# Patient Record
Sex: FEMALE | Race: WHITE | Employment: PART TIME | URBAN - METROPOLITAN AREA
[De-identification: names, ages, dates, MRNs, and addresses within clinical notes are randomized per-mention and may not be internally consistent; named-entity substitution may affect disease eponyms.]

---

## 2017-01-16 RX ORDER — AMITRIPTYLINE HYDROCHLORIDE 10 MG/1
10 TABLET, FILM COATED ORAL
Qty: 30 TAB | Refills: 5 | Status: SHIPPED | OUTPATIENT
Start: 2017-01-16 | End: 2017-05-22 | Stop reason: SDUPTHER

## 2017-03-24 ENCOUNTER — OFFICE VISIT (OUTPATIENT)
Dept: INTERNAL MEDICINE CLINIC | Age: 57
End: 2017-03-24

## 2017-03-24 ENCOUNTER — HOSPITAL ENCOUNTER (OUTPATIENT)
Dept: MAMMOGRAPHY | Age: 57
Discharge: HOME | End: 2017-03-24
Attending: INTERNAL MEDICINE
Payer: COMMERCIAL

## 2017-03-24 ENCOUNTER — HOSPITAL ENCOUNTER (OUTPATIENT)
Dept: LAB | Age: 57
Discharge: HOME | End: 2017-03-24
Payer: COMMERCIAL

## 2017-03-24 VITALS
RESPIRATION RATE: 18 BRPM | TEMPERATURE: 96.1 F | WEIGHT: 190 LBS | DIASTOLIC BLOOD PRESSURE: 86 MMHG | OXYGEN SATURATION: 97 % | BODY MASS INDEX: 34.96 KG/M2 | SYSTOLIC BLOOD PRESSURE: 134 MMHG | HEIGHT: 62 IN | HEART RATE: 97 BPM

## 2017-03-24 DIAGNOSIS — R41.3 MEMORY LOSS, SHORT TERM: ICD-10-CM

## 2017-03-24 DIAGNOSIS — E78.2 MIXED HYPERLIPIDEMIA: ICD-10-CM

## 2017-03-24 DIAGNOSIS — Z01.419 WELL WOMAN EXAM WITH ROUTINE GYNECOLOGICAL EXAM: ICD-10-CM

## 2017-03-24 DIAGNOSIS — R51.9 ACUTE NONINTRACTABLE HEADACHE, UNSPECIFIED HEADACHE TYPE: ICD-10-CM

## 2017-03-24 DIAGNOSIS — E55.9 VITAMIN D DEFICIENCY: ICD-10-CM

## 2017-03-24 DIAGNOSIS — R63.1 INCREASED THIRST: ICD-10-CM

## 2017-03-24 DIAGNOSIS — Z01.419 WELL WOMAN EXAM WITH ROUTINE GYNECOLOGICAL EXAM: Primary | ICD-10-CM

## 2017-03-24 DIAGNOSIS — R19.8 RECTAL FULLNESS: ICD-10-CM

## 2017-03-24 DIAGNOSIS — I10 ESSENTIAL HYPERTENSION: Chronic | ICD-10-CM

## 2017-03-24 PROCEDURE — 77067 SCR MAMMO BI INCL CAD: CPT

## 2017-03-24 PROCEDURE — 87624 HPV HI-RISK TYP POOLED RSLT: CPT | Performed by: NURSE PRACTITIONER

## 2017-03-24 PROCEDURE — 88175 CYTOPATH C/V AUTO FLUID REDO: CPT | Performed by: NURSE PRACTITIONER

## 2017-03-24 RX ORDER — BISMUTH SUBSALICYLATE 262 MG
1 TABLET,CHEWABLE ORAL DAILY
COMMUNITY
End: 2018-05-02 | Stop reason: ALTCHOICE

## 2017-03-24 RX ORDER — MULTIVIT WITH MINERALS/HERBS
1 TABLET ORAL AS NEEDED
COMMUNITY
End: 2020-05-15

## 2017-03-24 NOTE — MR AVS SNAPSHOT
Visit Information Date & Time Provider Department Dept. Phone Encounter #  
 3/24/2017  9:00 AM Alexis Lane NP [facility] [address] [id number] Upcoming Health Maintenance Date Due  
 PAP AKA CERVICAL CYTOLOGY 12/21/2015 BREAST CANCER SCRN MAMMOGRAM 2/13/2018 COLONOSCOPY 6/28/2020 DTaP/Tdap/Td series (2 - Td) 12/21/2020 Allergies as of 3/24/2017  Review Complete On: 3/24/2017 By: Alexis Lane NP Severity Noted Reaction Type Reactions Erythromycin  11/04/2010    Nausea and Vomiting Lisinopril  07/30/2015    Other (comments) \"margoth achy\" Current Immunizations  Reviewed on 6/20/2014 Name Date Influenza Vaccine 10/24/2016, 10/1/2014 Influenza Vaccine Whole 10/1/2012 TDAP Vaccine 12/21/2010 Not reviewed this visit You Were Diagnosed With   
  
 Codes Comments Well woman exam with routine gynecological exam    -  Primary ICD-10-CM: K74.827 ICD-9-CM: V72.31 Essential hypertension     ICD-10-CM: I10 
ICD-9-CM: 401.9 Mixed hyperlipidemia     ICD-10-CM: E78.2 ICD-9-CM: 272.2 Increased thirst     ICD-10-CM: R63.1 ICD-9-CM: 783.5 Memory loss, short term     ICD-10-CM: R41.3 ICD-9-CM: 780.93 Rectal fullness     ICD-10-CM: R19.8 ICD-9-CM: 787.99 Acute nonintractable headache, unspecified headache type     ICD-10-CM: R51 ICD-9-CM: 188. 0 Vitamin D deficiency     ICD-10-CM: E55.9 ICD-9-CM: 268.9 Vitals BP Pulse Temp Resp Height(growth percentile) Weight(growth percentile) (!) 134/98 97 96.1 °F (35.6 °C) (Oral) 18 5' 2\" (1.575 m) 190 lb (86.2 kg) LMP SpO2 BMI OB Status Smoking Status 11/07/2010 97% 34.75 kg/m2 Postmenopausal Never Smoker Vitals History BMI and BSA Data Body Mass Index Body Surface Area 34.75 kg/m 2 1.94 m 2 Preferred Pharmacy Pharmacy Name Phone [id number]- [address] AT [facility] [phone] Your Updated Medication List  
  
   
This list is accurate as of: 3/24/17  9:56 AM.  Always use your most recent med list.  
  
  
  
  
 * amitriptyline 10 mg tablet Commonly known as:  ELAVIL TAKE 1 TABLET BY MOUTH NIGHTLY. FOR MIGRAINE PREVENTION AND SLEEP  
  
 * amitriptyline 10 mg tablet Commonly known as:  ELAVIL Take 1 Tab by mouth nightly. atorvastatin 10 mg tablet Commonly known as:  LIPITOR Take 1 Tab by mouth daily. b complex vitamins tablet Take 1 Tab by mouth daily. FISH -160-1,000 mg Cap Generic drug:  omega 3-dha-epa-fish oil Take  by mouth. hydroCHLOROthiazide 25 mg tablet Commonly known as:  HYDRODIURIL Take 1 Tab by mouth daily. multivitamin tablet Commonly known as:  ONE A DAY Take 1 Tab by mouth daily. omeprazole 20 mg tablet Commonly known as:  PriLOSEC OTC Take 20 mg by mouth daily. potassium chloride 10 mEq tablet Commonly known as:  K-DUR, KLOR-CON Take 1 Tab by mouth two (2) times a day for 30 days. promethazine 25 mg tablet Commonly known as:  PHENERGAN Take 1 Tab by mouth every six (6) hours as needed for Nausea. rizatriptan 10 mg disintegrating tablet Commonly known as:  MAXALT-MLT Take 1 Tab by mouth once as needed for Migraine for up to 1 dose. vit a,c & e-lutein-minerals tablet Commonly known as:  OCUVITE  
daily. * Notice: This list has 2 medication(s) that are the same as other medications prescribed for you. Read the directions carefully, and ask your doctor or other care provider to review them with you. We Performed the Following CBC WITH AUTOMATED DIFF [07008 CPT(R)] HEMOGLOBIN A1C WITH EAG [47451 CPT(R)] LIPID PANEL [84069 CPT(R)] METABOLIC PANEL, COMPREHENSIVE [62844 CPT(R)] PAP IG, APTIMA HPV AND RFX 16/18,45 ([id number]) [[id number] Custom] REFERRAL TO COLON AND RECTAL SURGERY [REF17 Custom] REFERRAL TO NEUROLOGY [UJQ99 Custom] Comments:  
 Please evaluate patient for memory loss; right sided headache; history of migraines TSH 3RD GENERATION [89878 CPT(R)] URINALYSIS W/ RFLX MICROSCOPIC [32496 CPT(R)] VITAMIN D, 25 HYDROXY [id number] CPT(R)] To-Do List   
 03/24/2017 Imaging:  YESY MAMMO BI SCREENING INCL CAD   
  
 03/24/2017 Imaging:  MRI BRAIN W WO CONT Referral Information Referral ID Referred By Referred To  
  
 [id number] MD Keturah Barksdale [address] [facility], [address] [facility] Phone: [phone] Fax: [phone] Visits Status Start Date End Date 1 New Request 3/24/17 3/24/18 If your referral has a status of pending review or denied, additional information will be sent to support the outcome of this decision. Referral ID Referred By Referred To  
 [id number] Yamilka Ahumada MD  
   [place] [address] [facility] [place], [facility] [address] Phone: [phone] Fax: [phone] Visits Status Start Date End Date 1 New Request 3/24/17 3/24/18 If your referral has a status of pending review or denied, additional information will be sent to support the outcome of this decision. Patient Instructions Well Visit, Women 48 to 72: Care Instructions Your Care Instructions Physical exams can help you stay healthy. Your doctor has checked your overall health and may have suggested ways to take good care of yourself. He or she also may have recommended tests. At home, you can help prevent illness with healthy eating, regular exercise, and other steps. Follow-up care is a key part of your treatment and safety.  Be sure to make and go to all appointments, and call your doctor if you are having problems. It's also a good idea to know your test results and keep a list of the medicines you take. How can you care for yourself at home? · Reach and stay at a healthy weight. This will lower your risk for many problems, such as obesity, diabetes, heart disease, and high blood pressure. · Get at least 30 minutes of exercise on most days of the week. Walking is a good choice. You also may want to do other activities, such as running, swimming, cycling, or playing tennis or team sports. · Do not smoke. Smoking can make health problems worse. If you need help quitting, talk to your doctor about stop-smoking programs and medicines. These can increase your chances of quitting for good. · Protect your skin from too much sun. When you're outdoors from 10 a.m. to 4 p.m., stay in the shade or cover up with clothing and a hat with a wide brim. Wear sunglasses that block UV rays. Even when it's cloudy, put broad-spectrum sunscreen (SPF 30 or higher) on any exposed skin. · See a dentist one or two times a year for checkups and to have your teeth cleaned. · Wear a seat belt in the car. · Limit alcohol to 1 drink a day. Too much alcohol can cause health problems. Follow your doctor's advice about when to have certain tests. These tests can spot problems early. · Cholesterol. Your doctor will tell you how often to have this done based on your age, family history, or other things that can increase your risk for heart attack and stroke. · Blood pressure. Have your blood pressure checked during a routine doctor visit. Your doctor will tell you how often to check your blood pressure based on your age, your blood pressure results, and other factors. · Mammogram. Ask your doctor how often you should have a mammogram, which is an X-ray of your breasts. A mammogram can spot breast cancer before it can be felt and when it is easiest to treat. · Pap test and pelvic exam. Ask your doctor how often you should have a Pap test. You may not need to have a Pap test as often as you used to. · Vision. Have your eyes checked every year or two or as often as your doctor suggests. Some experts recommend that you have yearly exams for glaucoma and other age-related eye problems starting at age 48. · Hearing. Tell your doctor if you notice any change in your hearing. You can have tests to find out how well you hear. · Diabetes. Ask your doctor whether you should have tests for diabetes. · Colon cancer. You should begin tests for colon cancer at age 48. You may have one of several tests. Your doctor will tell you how often to have tests based on your age and risk. Risks include whether you already had a precancerous polyp removed from your colon or whether your parents, sisters and brothers, or children have had colon cancer. · Thyroid disease. Talk to your doctor about whether to have your thyroid checked as part of a regular physical exam. Women have an increased chance of a thyroid problem. · Osteoporosis. You should begin tests for bone density at age 72. If you are younger than 72, ask your doctor whether you have factors that may increase your risk for this disease. You may want to have this test before age 72. · Heart attack and stroke risk. At least every 4 to 6 years, you should have your risk for heart attack and stroke assessed. Your doctor uses factors such as your age, blood pressure, cholesterol, and whether you smoke or have diabetes to show what your risk for a heart attack or stroke is over the next 10 years. When should you call for help? Watch closely for changes in your health, and be sure to contact your doctor if you have any problems or symptoms that concern you. Where can you learn more? Go to http://keshia-sabrina.info/.  
Enter J868 in the search box to learn more about \"Well Visit, Women 48 to 65: Care Instructions. \" Current as of: July 19, 2016 Content Version: 11.1 © 6177-5367 [employer] Media[employer]. Care instructions adapted under license by [employer] (which disclaims liability or warranty for this information). If you have questions about a medical condition or this instruction, always ask your healthcare professional. [address] any warranty or liability for your use of this information. Learning About High Cholesterol What is high cholesterol? Cholesterol is a type of fat in your blood. It is needed for many body functions, such as making new cells. Cholesterol is made by your body. It also comes from food you eat. If you have too much cholesterol, it starts to build up in your arteries. This is called hardening of the arteries, or atherosclerosis. High cholesterol raises your risk of a heart attack and stroke. There are different types of cholesterol. LDL is the \"bad\" cholesterol. High LDL can raise your risk for heart disease, heart attack, and stroke. HDL is the \"good\" cholesterol. High HDL is linked with a lower risk for heart disease, heart attack, and stroke. Your cholesterol levels help your doctor find out your risk for having a heart attack or stroke. How can you prevent high cholesterol? A heart-healthy lifestyle can help you prevent high cholesterol. This lifestyle helps lower your risk for a heart attack and stroke. · Eat heart-healthy foods. ¨ Eat fruits, vegetables, whole grains (like oatmeal), dried beans and peas, nuts and seeds, soy products (like tofu), and fat-free or low-fat dairy products. ¨ Replace butter, margarine, and hydrogenated or partially hydrogenated oils with olive and canola oils. (Canola oil margarine without trans fat is fine.) ¨ Replace red meat with fish, poultry, and soy protein (like tofu). ¨ Limit processed and packaged foods like chips, crackers, and cookies. · Be active. Exercise can improve your cholesterol level. Get at least 30 minutes of exercise on most days of the week. Walking is a good choice. You also may want to do other activities, such as running, swimming, cycling, or playing tennis or team sports. · Stay at a healthy weight. Lose weight if you need to. · Don't smoke. If you need help quitting, talk to your doctor about stop-smoking programs and medicines. These can increase your chances of quitting for good. How is high cholesterol treated? The goal of treatment is to reduce your chances of having a heart attack or stroke. The goal is not to lower your cholesterol numbers only. · You may make lifestyle changes, such as eating healthy foods, not smoking, losing weight, and being more active. · You may have to take medicine. Follow-up care is a key part of your treatment and safety. Be sure to make and go to all appointments, and call your doctor if you are having problems. It's also a good idea to know your test results and keep a list of the medicines you take. Where can you learn more? Go to http://keshia-sabrina.info/. Enter I786 in the search box to learn more about \"Learning About High Cholesterol. \" Current as of: January 27, 2016 Content Version: 11.1 © 0513-4089 [employer]. Care instructions adapted under license by Hemenkiralik.[employer] (which disclaims liability or warranty for this information). If you have questions about a medical condition or this instruction, always ask your healthcare professional. [place] [address] any warranty or liability for your use of this information. Introducing [facility]! Dear Marcos Bob: 
Thank you for requesting a Hmall.ma account. Our records indicate that you already have an active [email or web address] account. You can access your account anytime at [email or web address] Did you know that you can access your hospital and ER discharge instructions at any time in Ask Ziggy? You can also review all of your test results from your hospital stay or ER visit. Additional Information If you have questions, please visit the Frequently Asked Questions section of the Ask Ziggy website at [email or web address] Remember, Ask Ziggy is NOT to be used for urgent needs. For medical emergencies, dial 911. Now available from your iPhone and Android! Please provide this summary of care documentation to your next provider. Your primary care clinician is listed as Adriano Albarran. If you have any questions after today's visit, please call [phone].

## 2017-03-24 NOTE — PATIENT INSTRUCTIONS
Well Visit, Women 48 to 72: Care Instructions  Your Care Instructions  Physical exams can help you stay healthy. Your doctor has checked your overall health and may have suggested ways to take good care of yourself. He or she also may have recommended tests. At home, you can help prevent illness with healthy eating, regular exercise, and other steps. Follow-up care is a key part of your treatment and safety. Be sure to make and go to all appointments, and call your doctor if you are having problems. It's also a good idea to know your test results and keep a list of the medicines you take. How can you care for yourself at home? · Reach and stay at a healthy weight. This will lower your risk for many problems, such as obesity, diabetes, heart disease, and high blood pressure. · Get at least 30 minutes of exercise on most days of the week. Walking is a good choice. You also may want to do other activities, such as running, swimming, cycling, or playing tennis or team sports. · Do not smoke. Smoking can make health problems worse. If you need help quitting, talk to your doctor about stop-smoking programs and medicines. These can increase your chances of quitting for good. · Protect your skin from too much sun. When you're outdoors from 10 a.m. to 4 p.m., stay in the shade or cover up with clothing and a hat with a wide brim. Wear sunglasses that block UV rays. Even when it's cloudy, put broad-spectrum sunscreen (SPF 30 or higher) on any exposed skin. · See a dentist one or two times a year for checkups and to have your teeth cleaned. · Wear a seat belt in the car. · Limit alcohol to 1 drink a day. Too much alcohol can cause health problems. Follow your doctor's advice about when to have certain tests. These tests can spot problems early. · Cholesterol.  Your doctor will tell you how often to have this done based on your age, family history, or other things that can increase your risk for heart attack and stroke. · Blood pressure. Have your blood pressure checked during a routine doctor visit. Your doctor will tell you how often to check your blood pressure based on your age, your blood pressure results, and other factors. · Mammogram. Ask your doctor how often you should have a mammogram, which is an X-ray of your breasts. A mammogram can spot breast cancer before it can be felt and when it is easiest to treat. · Pap test and pelvic exam. Ask your doctor how often you should have a Pap test. You may not need to have a Pap test as often as you used to. · Vision. Have your eyes checked every year or two or as often as your doctor suggests. Some experts recommend that you have yearly exams for glaucoma and other age-related eye problems starting at age 48. · Hearing. Tell your doctor if you notice any change in your hearing. You can have tests to find out how well you hear. · Diabetes. Ask your doctor whether you should have tests for diabetes. · Colon cancer. You should begin tests for colon cancer at age 48. You may have one of several tests. Your doctor will tell you how often to have tests based on your age and risk. Risks include whether you already had a precancerous polyp removed from your colon or whether your parents, sisters and brothers, or children have had colon cancer. · Thyroid disease. Talk to your doctor about whether to have your thyroid checked as part of a regular physical exam. Women have an increased chance of a thyroid problem. · Osteoporosis. You should begin tests for bone density at age 72. If you are younger than 72, ask your doctor whether you have factors that may increase your risk for this disease. You may want to have this test before age 72. · Heart attack and stroke risk. At least every 4 to 6 years, you should have your risk for heart attack and stroke assessed.  Your doctor uses factors such as your age, blood pressure, cholesterol, and whether you smoke or have diabetes to show what your risk for a heart attack or stroke is over the next 10 years. When should you call for help? Watch closely for changes in your health, and be sure to contact your doctor if you have any problems or symptoms that concern you. Where can you learn more? Go to http://keshia-sabrina.info/. Enter K844 in the search box to learn more about \"Well Visit, Women 50 to 72: Care Instructions. \"  Current as of: July 19, 2016  Content Version: 11.1  © 6158-7266 SwapDrive. Care instructions adapted under license by Per[employer] Pixel (which disclaims liability or warranty for this information). If you have questions about a medical condition or this instruction, always ask your healthcare professional. [address] any warranty or liability for your use of this information. Learning About High Cholesterol  What is high cholesterol? Cholesterol is a type of fat in your blood. It is needed for many body functions, such as making new cells. Cholesterol is made by your body. It also comes from food you eat. If you have too much cholesterol, it starts to build up in your arteries. This is called hardening of the arteries, or atherosclerosis. High cholesterol raises your risk of a heart attack and stroke. There are different types of cholesterol. LDL is the \"bad\" cholesterol. High LDL can raise your risk for heart disease, heart attack, and stroke. HDL is the \"good\" cholesterol. High HDL is linked with a lower risk for heart disease, heart attack, and stroke. Your cholesterol levels help your doctor find out your risk for having a heart attack or stroke. How can you prevent high cholesterol? A heart-healthy lifestyle can help you prevent high cholesterol. This lifestyle helps lower your risk for a heart attack and stroke. · Eat heart-healthy foods.   ¨ Eat fruits, vegetables, whole grains (like oatmeal), dried beans and peas, nuts and seeds, soy products (like tofu), and fat-free or low-fat dairy products. ¨ Replace butter, margarine, and hydrogenated or partially hydrogenated oils with olive and canola oils. (Canola oil margarine without trans fat is fine.)  ¨ Replace red meat with fish, poultry, and soy protein (like tofu). ¨ Limit processed and packaged foods like chips, crackers, and cookies. · Be active. Exercise can improve your cholesterol level. Get at least 30 minutes of exercise on most days of the week. Walking is a good choice. You also may want to do other activities, such as running, swimming, cycling, or playing tennis or team sports. · Stay at a healthy weight. Lose weight if you need to. · Don't smoke. If you need help quitting, talk to your doctor about stop-smoking programs and medicines. These can increase your chances of quitting for good. How is high cholesterol treated? The goal of treatment is to reduce your chances of having a heart attack or stroke. The goal is not to lower your cholesterol numbers only. · You may make lifestyle changes, such as eating healthy foods, not smoking, losing weight, and being more active. · You may have to take medicine. Follow-up care is a key part of your treatment and safety. Be sure to make and go to all appointments, and call your doctor if you are having problems. It's also a good idea to know your test results and keep a list of the medicines you take. Where can you learn more? Go to http://keshia-sabrina.info/. Enter W734 in the search box to learn more about \"Learning About High Cholesterol. \"  Current as of: January 27, 2016  Content Version: 11.1  © 5183-5584 Healthwise, Incorporated. Care instructions adapted under license by [employer] (which disclaims liability or warranty for this information).  If you have questions about a medical condition or this instruction, always ask your healthcare professional. [address] any warranty or liability for your use of this information.

## 2017-03-25 LAB
25(OH)D3+25(OH)D2 SERPL-MCNC: 26.9 NG/ML (ref 30–100)
ALBUMIN SERPL-MCNC: 4.5 G/DL (ref 3.5–5.5)
ALBUMIN/GLOB SERPL: 1.6 {RATIO} (ref 1.2–2.2)
ALP SERPL-CCNC: 64 IU/L (ref 39–117)
ALT SERPL-CCNC: 41 IU/L (ref 0–32)
AST SERPL-CCNC: 23 IU/L (ref 0–40)
BASOPHILS # BLD AUTO: 0 X10E3/UL (ref 0–0.2)
BASOPHILS NFR BLD AUTO: 0 %
BILIRUB SERPL-MCNC: 0.5 MG/DL (ref 0–1.2)
BUN SERPL-MCNC: 20 MG/DL (ref 6–24)
BUN/CREAT SERPL: 27 (ref 9–23)
CALCIUM SERPL-MCNC: 9.6 MG/DL (ref 8.7–10.2)
CHLORIDE SERPL-SCNC: 96 MMOL/L (ref 96–106)
CHOLEST SERPL-MCNC: 283 MG/DL (ref 100–199)
CO2 SERPL-SCNC: 25 MMOL/L (ref 18–29)
COMMENT, 011824: ABNORMAL
CREAT SERPL-MCNC: 0.75 MG/DL (ref 0.57–1)
EOSINOPHIL # BLD AUTO: 0.1 X10E3/UL (ref 0–0.4)
EOSINOPHIL NFR BLD AUTO: 2 %
ERYTHROCYTE [DISTWIDTH] IN BLOOD BY AUTOMATED COUNT: 13.9 % (ref 12.3–15.4)
EST. AVERAGE GLUCOSE BLD GHB EST-MCNC: 111 MG/DL
GLOBULIN SER CALC-MCNC: 2.8 G/DL (ref 1.5–4.5)
GLUCOSE SERPL-MCNC: 107 MG/DL (ref 65–99)
HBA1C MFR BLD: 5.5 % (ref 4.8–5.6)
HCT VFR BLD AUTO: 47.2 % (ref 34–46.6)
HDLC SERPL-MCNC: 48 MG/DL
HGB BLD-MCNC: 15.8 G/DL (ref 11.1–15.9)
IMM GRANULOCYTES # BLD: 0 X10E3/UL (ref 0–0.1)
IMM GRANULOCYTES NFR BLD: 1 %
INTERPRETATION, 910389: NORMAL
LDLC SERPL CALC-MCNC: 209 MG/DL (ref 0–99)
LYMPHOCYTES # BLD AUTO: 1.5 X10E3/UL (ref 0.7–3.1)
LYMPHOCYTES NFR BLD AUTO: 29 %
MCH RBC QN AUTO: 28.3 PG (ref 26.6–33)
MCHC RBC AUTO-ENTMCNC: 33.5 G/DL (ref 31.5–35.7)
MCV RBC AUTO: 85 FL (ref 79–97)
MONOCYTES # BLD AUTO: 0.4 X10E3/UL (ref 0.1–0.9)
MONOCYTES NFR BLD AUTO: 7 %
NEUTROPHILS # BLD AUTO: 3.1 X10E3/UL (ref 1.4–7)
NEUTROPHILS NFR BLD AUTO: 61 %
PLATELET # BLD AUTO: 234 X10E3/UL (ref 150–379)
POTASSIUM SERPL-SCNC: 3.9 MMOL/L (ref 3.5–5.2)
PROT SERPL-MCNC: 7.3 G/DL (ref 6–8.5)
RBC # BLD AUTO: 5.58 X10E6/UL (ref 3.77–5.28)
SODIUM SERPL-SCNC: 140 MMOL/L (ref 134–144)
TRIGL SERPL-MCNC: 131 MG/DL (ref 0–149)
TSH SERPL DL<=0.005 MIU/L-ACNC: 1.71 UIU/ML (ref 0.45–4.5)
VLDLC SERPL CALC-MCNC: 26 MG/DL (ref 5–40)
WBC # BLD AUTO: 5.2 X10E3/UL (ref 3.4–10.8)

## 2017-03-27 RX ORDER — POTASSIUM CHLORIDE 750 MG/1
10 TABLET, EXTENDED RELEASE ORAL 2 TIMES DAILY
Qty: 60 TAB | Refills: 3 | Status: SHIPPED | OUTPATIENT
Start: 2017-03-27 | End: 2017-10-19 | Stop reason: SDUPTHER

## 2017-03-27 RX ORDER — ROSUVASTATIN CALCIUM 10 MG/1
10 TABLET, COATED ORAL
Qty: 30 TAB | Refills: 3 | Status: SHIPPED | OUTPATIENT
Start: 2017-03-27 | End: 2017-06-01 | Stop reason: SDUPTHER

## 2017-03-27 NOTE — PROGRESS NOTES
Message sent and please call patient with cholesterol levels and need to start her on Crestor -- I will not escribe it until we hear that she is willing to try this

## 2017-03-27 NOTE — PROGRESS NOTES
HISTORY OF PRESENT ILLNESS  Melvi Boyd is a 64 y.o. female. HPI  Presents for well woman exam but has other concerns to discuss. Subjective:   Melvi Boyd is a 64 y.o. female with hypertension. Hypertension ROS: taking medications as instructed, no medication side effects noted, no TIA's, no chest pain on exertion, no dyspnea on exertion, no swelling of ankles. New concerns: has been controlled on HCTZ. Reports that she has been intolerant of statins in the past and experienced myalgias with Simvastatin and is unsure if she had problems with Atorvastatin. Her last lipid panel was elevated and Dr oRger Gregory had recommended that she restart the Atorvastatin but she has not done this. She is fasting today. Has noticed that she seems to experience increased thirst over the past several months. Denies polyuria or polyphagia. Has gained weight gradually over the past 2 years. History of migraine headaches and frequency has improved since she started taking Amitriptyline 10 mg but she has been feeling more fatigued in the am.  Has been noting a change in her memory over the past 2 years. Has difficulty focusing and has been more forgetful with small issues and short term memory. Denies having issues with long term memory. Had extensive labs done in 2/2016 as workup for same symptoms. Denies unusual behaviors. Complains of recurrent sudden \"lightening bolt\" sensation on right side of head that radiates to top of scalp for the past 6 months but has been increasing in frequency over the past month. Lasts for several seconds and has been occurring several times per week. Cannot identify any particular trigger and episodes occur randomly. Denies dizziness or lightheadedness. She is very concerned about these symptoms considering her history of migraines. Complains of sensation of rectal fullness where she has to digitally extract stool due to sensation of incomplete emptying.   Feels like she has developed a \"pouch\" in rectum where stool gets caught and the only way she can relieve the pressure is to digitally disimpact stool herself. Denies noting blood in stools. Denies abdominal pain. Has been Vitamin D deficient in the past.  Not currently taking any Vitamin D supplements. Review of Systems   Constitutional: Negative for chills, fever and malaise/fatigue. HENT: Negative for congestion and sore throat. Respiratory: Negative for cough, sputum production and shortness of breath. Cardiovascular: Negative for chest pain, palpitations and leg swelling. Gastrointestinal: Positive for constipation. Negative for abdominal pain, blood in stool, nausea and vomiting. Genitourinary: Negative for dysuria and frequency. Musculoskeletal: Negative for myalgias. Skin: Negative for rash. Neurological: Positive for headaches. Negative for dizziness and tingling. Endo/Heme/Allergies: Positive for polydipsia. Psychiatric/Behavioral: The patient has insomnia. Physical Exam   Constitutional: She is oriented to person, place, and time. She appears well-developed and well-nourished. HENT:   Head: Normocephalic and atraumatic. Right Ear: External ear normal.   Left Ear: External ear normal.   Nose: Nose normal.   Mouth/Throat: Oropharynx is clear and moist.   Eyes: Conjunctivae and EOM are normal. Pupils are equal, round, and reactive to light. Neck: Normal range of motion. Neck supple. No thyromegaly present. Cardiovascular: Normal rate, regular rhythm and normal heart sounds. Pulmonary/Chest: Effort normal and breath sounds normal. She has no wheezes. She has no rales. Abdominal: Soft. Bowel sounds are normal. There is no tenderness. There is no rebound. Musculoskeletal: Normal range of motion. She exhibits no edema. Lymphadenopathy:     She has no cervical adenopathy. Neurological: She is alert and oriented to person, place, and time. No cranial nerve deficit. Coordination normal.   Skin: Skin is warm and dry. Psychiatric: She has a normal mood and affect. Her behavior is normal.   Nursing note and vitals reviewed. ASSESSMENT and PLAN  Caitlin Brown was seen today for complete physical, head pain and memory loss. Diagnoses and all orders for this visit:    Well woman exam with routine gynecological exam  -     YESY MAMMO BI SCREENING INCL CAD; Future  -     CBC WITH AUTOMATED DIFF  -     METABOLIC PANEL, COMPREHENSIVE  -     URINALYSIS W/ RFLX MICROSCOPIC  -     PAP IG, APTIMA HPV AND RFX 16/18,45 ([id number])    Essential hypertension  -     METABOLIC PANEL, COMPREHENSIVE    Mixed hyperlipidemia  -     METABOLIC PANEL, COMPREHENSIVE  -     LIPID PANEL    Increased thirst  -     METABOLIC PANEL, COMPREHENSIVE  -     HEMOGLOBIN A1C WITH EAG  -     URINALYSIS W/ RFLX MICROSCOPIC    Memory loss, short term  -     TSH 3RD GENERATION  -     MRI BRAIN W WO CONT; Future    Rectal fullness  -     REFERRAL TO COLON AND RECTAL SURGERY    Acute nonintractable headache, unspecified headache type  -     REFERRAL TO NEUROLOGY  -     MRI BRAIN W WO CONT; Future    Vitamin D deficiency  -     VITAMIN D, 25 HYDROXY          lab results and schedule of future lab studies reviewed with patient  reviewed diet, exercise and weight control  cardiovascular risk and specific lipid/LDL goals reviewed  reviewed medications and side effects in detail    Subjective:   64 y.o. female for Well Woman Check. Last pap in 2012. She is postmenopausal.  Denies any vaginal bleeding. Social History: single partner, contraception - post menopausal status. Pertinent past medical hstory: hypertension, migraines, hyperlipidemia. Current Outpatient Prescriptions   Medication Sig Dispense Refill    multivitamin (ONE A DAY) tablet Take 1 Tab by mouth daily.  vit a,c & e-lutein-minerals (OCUVITE) tablet daily.  b complex vitamins tablet Take 1 Tab by mouth daily.       omega 3-dha-epa-fish oil (FISH OIL) 100-160-1,000 mg cap Take  by mouth.  amitriptyline (ELAVIL) 10 mg tablet Take 1 Tab by mouth nightly. 30 Tab 5    hydroCHLOROthiazide (HYDRODIURIL) 25 mg tablet Take 1 Tab by mouth daily. 30 Tab 5    rizatriptan (MAXALT-MLT) 10 mg disintegrating tablet Take 1 Tab by mouth once as needed for Migraine for up to 1 dose. 9 Tab 2    amitriptyline (ELAVIL) 10 mg tablet TAKE 1 TABLET BY MOUTH NIGHTLY. FOR MIGRAINE PREVENTION AND SLEEP 30 Tab 2    potassium chloride (K-DUR, KLOR-CON) 10 mEq tablet Take 1 Tab by mouth two (2) times a day for 30 days. 60 Tab 3    atorvastatin (LIPITOR) 10 mg tablet Take 1 Tab by mouth daily. 30 Tab 3    promethazine (PHENERGAN) 25 mg tablet Take 1 Tab by mouth every six (6) hours as needed for Nausea. 30 Tab 1    omeprazole (PRILOSEC OTC) 20 mg tablet Take 20 mg by mouth daily.  90 Tab 1     Allergies   Allergen Reactions    Erythromycin Nausea and Vomiting    Lisinopril Other (comments)     \"margoth achy\"     Past Medical History:   Diagnosis Date    Desmoid fibromatosis     chest    Dysfunctional uterine bleeding 8/17/2011    GERD (gastroesophageal reflux disease)     Hyperlipemia     Hypertension     Insomnia     severe, sleep study normal 2011    Migraine 2/24/2012     Past Surgical History:   Procedure Laterality Date    CHEST SURGERY PROCEDURE UNLISTED      desmoid tumor    ENDOSCOPY, COLON, DIAGNOSTIC  5/2010    HX BREAST BIOPSY Right     benign    HX GYN      d and c    HX OTHER SURGICAL      tumor removal in chest removed     Family History   Problem Relation Age of Onset    Thyroid Disease Mother      goiter    Hypertension Father     Thyroid Disease Sister      nodules    Hypertension Sister     Cancer Maternal Grandmother      polycythemia    Cancer Paternal Grandmother      breast    Breast Cancer Paternal Grandmother     Cancer Paternal Grandfather      lung cancer    Other Sister      lyme disease    No Known Problems Sister      Social History   Substance Use Topics    Smoking status: Never Smoker    Smokeless tobacco: Never Used    Alcohol use Yes      Comment: rarely        ROS:  Feeling well. No dyspnea or chest pain on exertion. No abdominal pain, change in bowel habits, black or bloody stools. No urinary tract symptoms. GYN ROS: no breast pain or new or enlarging lumps on self exam, no vaginal bleeding, no discharge or pelvic pain. Menopausal symptoms: none. No neurological complaints. Last DEXA scan and T-score: none  Last Colonoscopy: 2010; was normal  Last Mammogram: 2016    Objective:     Visit Vitals    /86    Pulse 97    Temp 96.1 °F (35.6 °C) (Oral)    Resp 18    Ht 5' 2\" (1.575 m)    Wt 190 lb (86.2 kg)    LMP 11/07/2010    SpO2 97%    BMI 34.75 kg/m2     The patient appears well, alert, oriented x 3, in no distress. ENT normal.  Neck supple. No adenopathy or thyromegaly. MELONIE. Lungs are clear, good air entry, no wheezes, rhonchi or rales. S1 and S2 normal, no murmurs, regular rate and rhythm. Abdomen soft without tenderness, guarding, mass or organomegaly. Extremities show no edema, normal peripheral pulses. Neurological is normal, no focal findings. BREAST EXAM: breasts appear normal, no suspicious masses, no skin or nipple changes or axillary nodes    PELVIC EXAM: normal external genitalia, vulva, vagina, cervix, uterus and adnexa    Assessment/Plan:   well woman  postmenopausal  mammogram  pap smear  additional lab tests per orders  return annually or prn  Meaghan Torres was seen today for complete physical, head pain and memory loss. Diagnoses and all orders for this visit:    Well woman exam with routine gynecological exam  -     YESY MAMMO BI SCREENING INCL CAD;  Future  -     CBC WITH AUTOMATED DIFF  -     METABOLIC PANEL, COMPREHENSIVE  -     URINALYSIS W/ RFLX MICROSCOPIC  -     PAP IG, APTIMA HPV AND RFX 16/18,45 ([id number])    Essential hypertension -- stable on HCTZ  -     METABOLIC PANEL, COMPREHENSIVE    Mixed hyperlipidemia  -     METABOLIC PANEL, COMPREHENSIVE  -     LIPID PANEL    Increased thirst  -     METABOLIC PANEL, COMPREHENSIVE  -     HEMOGLOBIN A1C WITH EAG  -     URINALYSIS W/ RFLX MICROSCOPIC    Memory loss, short term -- will have her evaluated by neurology; may be component of ADD but concern about recent electrical shock sensation to right side of head that has been transient.  -     TSH 3RD GENERATION  -     MRI BRAIN W WO CONT; Future    Rectal fullness  -     REFERRAL TO COLON AND RECTAL SURGERY    Acute nonintractable headache, unspecified headache type  -     REFERRAL TO NEUROLOGY  -     MRI BRAIN W WO CONT; Future    Vitamin D deficiency  -     VITAMIN D, 25 HYDROXY        Kasie Noreenkb was counseled on age-appropriate/ guideline-based risk prevention behaviors and screening for a 64y.o. year old   female . We also discussed adjustments in screening based on family history if necessary. Printed instructions for preventative screening guidelines and healthy behaviors given to patient with after visit summary. lab results and schedule of future lab studies reviewed with patient  reviewed diet, exercise and weight control  cardiovascular risk and specific lipid/LDL goals reviewed  reviewed medications and side effects in detail.

## 2017-03-27 NOTE — PROGRESS NOTES
Spoke to pt, pt stated she is willing to try Crestor and send to her local pharmacy, pt was wondering if she can get a refill of her Klor con also?

## 2017-03-31 ENCOUNTER — HOSPITAL ENCOUNTER (OUTPATIENT)
Dept: MRI IMAGING | Age: 57
Discharge: HOME | End: 2017-03-31
Attending: NURSE PRACTITIONER
Payer: COMMERCIAL

## 2017-03-31 DIAGNOSIS — R41.3 MEMORY LOSS, SHORT TERM: ICD-10-CM

## 2017-03-31 DIAGNOSIS — R51.9 ACUTE NONINTRACTABLE HEADACHE, UNSPECIFIED HEADACHE TYPE: ICD-10-CM

## 2017-03-31 PROCEDURE — 70553 MRI BRAIN STEM W/O & W/DYE: CPT

## 2017-03-31 PROCEDURE — A9585 GADOBUTROL INJECTION: HCPCS | Performed by: NURSE PRACTITIONER

## 2017-03-31 PROCEDURE — 74011250636 HC RX REV CODE- 250/636: Performed by: NURSE PRACTITIONER

## 2017-03-31 RX ADMIN — GADOBUTROL 8 ML: 604.72 INJECTION INTRAVENOUS at 16:53

## 2017-05-22 RX ORDER — HYDROCHLOROTHIAZIDE 25 MG/1
25 TABLET ORAL DAILY
Qty: 90 TAB | Refills: 0 | Status: SHIPPED | OUTPATIENT
Start: 2017-05-22 | End: 2017-09-17 | Stop reason: SDUPTHER

## 2017-05-22 RX ORDER — AMITRIPTYLINE HYDROCHLORIDE 10 MG/1
10 TABLET, FILM COATED ORAL
Qty: 90 TAB | Refills: 0 | Status: SHIPPED | OUTPATIENT
Start: 2017-05-22 | End: 2017-06-19

## 2017-06-02 RX ORDER — ROSUVASTATIN CALCIUM 10 MG/1
10 TABLET, COATED ORAL
Qty: 30 TAB | Refills: 5 | Status: SHIPPED | OUTPATIENT
Start: 2017-06-02 | End: 2018-05-02 | Stop reason: SDUPTHER

## 2017-06-02 RX ORDER — ROSUVASTATIN CALCIUM 10 MG/1
10 TABLET, COATED ORAL
Qty: 30 TAB | Refills: 5 | Status: CANCELLED | OUTPATIENT
Start: 2017-06-02 | End: 2017-07-02

## 2017-06-02 NOTE — TELEPHONE ENCOUNTER
Patient needs a refill on her rosuvastatin (CRESTOR) 10 mg tablet  Needs 90 day supply per the Pharmacist [facility] at [phone]

## 2017-06-19 ENCOUNTER — OFFICE VISIT (OUTPATIENT)
Dept: INTERNAL MEDICINE CLINIC | Age: 57
End: 2017-06-19

## 2017-06-19 VITALS
HEIGHT: 62 IN | WEIGHT: 194.6 LBS | OXYGEN SATURATION: 97 % | DIASTOLIC BLOOD PRESSURE: 88 MMHG | SYSTOLIC BLOOD PRESSURE: 124 MMHG | RESPIRATION RATE: 14 BRPM | BODY MASS INDEX: 35.81 KG/M2 | HEART RATE: 106 BPM | TEMPERATURE: 98.4 F

## 2017-06-19 DIAGNOSIS — G43.919 INTRACTABLE MIGRAINE WITHOUT STATUS MIGRAINOSUS, UNSPECIFIED MIGRAINE TYPE: Chronic | ICD-10-CM

## 2017-06-19 DIAGNOSIS — I10 ESSENTIAL HYPERTENSION: Chronic | ICD-10-CM

## 2017-06-19 DIAGNOSIS — Z01.810 PREOP CARDIOVASCULAR EXAM: Primary | ICD-10-CM

## 2017-06-19 DIAGNOSIS — E78.5 HYPERLIPIDEMIA LDL GOAL <130: ICD-10-CM

## 2017-06-19 NOTE — PROGRESS NOTES
HISTORY OF PRESENT ILLNESS  Reta Carney is a 64 y.o. female. Pre-op Exam     Patient presents today for a pre-op exam for right cataract surgery. Patient denies SOB, cough, congestion, fever or chills. Getting pains in right side of head. She recently had head MRI and it came back normal.    Patient is taking amitriptyline for migraines and sleep. She states that both have improved since taking it. Hypertension ROS: taking medications as instructed, no medication side effects noted, no TIA's, no chest pain on exertion, no swelling of ankles. New concerns:  Patient's BP in office today is 124/88. Review of Systems   All other systems reviewed and are negative. Physical Exam   Constitutional: She is oriented to person, place, and time. She appears well-developed and well-nourished. HENT:   Head: Normocephalic and atraumatic. Right Ear: External ear normal.   Left Ear: External ear normal.   Nose: Nose normal.   Mouth/Throat: Oropharynx is clear and moist.   Eyes: Conjunctivae and EOM are normal.   Neck: Normal range of motion. Neck supple. Carotid bruit is not present. No thyroid mass and no thyromegaly present. Cardiovascular: Normal rate, regular rhythm, S1 normal, S2 normal, normal heart sounds and intact distal pulses. Pulmonary/Chest: Effort normal and breath sounds normal.   Abdominal: Soft. Normal appearance and bowel sounds are normal. There is no hepatosplenomegaly. There is no tenderness. Musculoskeletal: Normal range of motion. Neurological: She is alert and oriented to person, place, and time. She has normal strength. No cranial nerve deficit or sensory deficit. Coordination normal.   Skin: Skin is warm, dry and intact. No abrasion and no rash noted. Psychiatric: She has a normal mood and affect. Her behavior is normal. Judgment and thought content normal.   Nursing note and vitals reviewed.       ASSESSMENT and PLAN  Priya Schmitt was seen today for pre-op exam.    Diagnoses and all orders for this visit:    Preop cardiovascular exam  Patient is clear for cataract surgery. Essential hypertension  BP stable. Continue current meds. Hyperlipidemia LDL goal <130  LDL was 209 on 3/24/2017. Intractable migraine without status migrainosus, unspecified migraine type  Migraines and insomnia stable on amitriptyline. lab results and schedule of future lab studies reviewed with patient  reviewed diet, exercise and weight control    Written by Brandy Cullen, as dictated by Hodan Long MD.     Current diagnosis and concerns discussed with pt at length. Understands risks and benefits or current treatment plan and medications and accepts the treatment and medication with any possible risks. Pt asks appropriate questions which were answered. Pt instructed to call with any concerns or problems.

## 2017-07-25 RX ORDER — ROSUVASTATIN CALCIUM 10 MG/1
TABLET, COATED ORAL
Qty: 30 TAB | Refills: 3 | Status: SHIPPED | OUTPATIENT
Start: 2017-07-25 | End: 2017-09-05 | Stop reason: SDUPTHER

## 2017-07-28 ENCOUNTER — OFFICE VISIT (OUTPATIENT)
Dept: NEUROLOGY | Age: 57
End: 2017-07-28

## 2017-07-28 DIAGNOSIS — Z86.59 HISTORY OF DEPRESSION: ICD-10-CM

## 2017-07-28 DIAGNOSIS — R47.89 WORD FINDING DIFFICULTY: ICD-10-CM

## 2017-07-28 DIAGNOSIS — F43.22 ADJUSTMENT DISORDER WITH ANXIETY: ICD-10-CM

## 2017-07-28 DIAGNOSIS — R41.3 SHORT-TERM MEMORY LOSS: Primary | ICD-10-CM

## 2017-07-28 DIAGNOSIS — R41.89 IMPAIRMENT OF COMPREHENSION: ICD-10-CM

## 2017-07-28 NOTE — PROGRESS NOTES
[address] [facility],[address]  [address]   [place] [address] [place] [address] [facility]   [phone] Office   [phone] Fax      Neuropsychology    Initial Diagnostic Interview Note      Referral:  Anila De León MD    Daniel Reveles is a 64 y.o. right handed  and remarried  female who was unaccompanied to the initial clinical interview on 7/28/17. Please refer to her medical records for details pertaining to her history. Briefly, the patient reported that she completed college. She denied any history of previously diagnosed LD and/or receipt of special education services. 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 She is . She just had her right cataract out. No history of previously diagnosed stroke, TBI, meningitis/encephalitis, LIANA Fever, Lupus, Lyme, brain tumor, toxic chemical exposure, seizures. She has a history of chronic migraines since about age 13 and is on medication for same. She is on amitryptiline by Dr. Marlyn Aguilar, which makes her tired and sluggish. She has not slept well since she had her first child 35 years ago. She, her spouse, her boss have all noticed that she struggles with short term memory. This has been going on for a couple of years. She had a couple of migraines so bad she thought she had a stroke, but recent MRI was normal.  She forgets the content of conversations. Misplaces things. Starts tasks and does not complete. Losing words. Loses train of thought in conversations. She has also noticed struggles with attention and focus. These were not issues a few years ago. She has to write everything down. Rodyceci Fuentes and spouse both told her she needs to see someone. Her father has dementia, but he had a stroke. Mother has dementia as well.   Mother had remarried someone and he starved her and has neurocognitive residua from that, too (doesn't sound like AD). When mother got to the hospital and staff asked spouse how he had let her get like that and he had an aneurysm and , right there. No changes in sense of taste or smell. She has a history of depression in the past, when she went on antidepressant when her first spouse . She took that medication for a year. He  at age 25 from Hodgkin's and  right after their second child was born. Twin sister's spouse . Another death in family as well. Those were bad years 20-30 years ago. She feels quite anxious about her memory loss. She is naturally a very happy person but this is bringing her down. She does smile 99% of the time, and this issue is the only thing that is really bothering her right now. No changes in sense of taste or smell. Appetite is too good. Marriage is going well. No changes in sex drive but has vaginal dryness, and I noted she should talk to Dr. Jennifer Clayton about it. [employer] not helpful. She has pain in the neck and hurt her neck snow skiing, water skiing, whiplash injury. Always asks her spouse to pull on her neck. Having some sharp pain on the right side of her head, going on about six months, which happens once a week. Has seen a chiropractor in the past but doesn't like them adjusting their neck.         Neuropsychological Mental Status Exam (NMSE):  Historian: Good  Praxis: No UE apraxia  R/L Orientation: Intact to self and to other  Dress: within normal limits   Weight: Overweight  Appearance/Hygiene: within normal limits   Gait: within normal limits   Assistive Devices: Glasses  Mood: within normal limits   Affect: within normal limits   Comprehension: within normal limits   Thought Process: within normal limits   Expressive Language: within normal limits   Receptive Language: within normal limits   Motor:  No cognitive or motor perseveration  ETOH: One or two drinks a month  Tobacco: Never   Illicit: Marijuana in high school  SI/HI: Denied  Psychosis: Denied/    Insight: Within normal limits  Judgment: Within normal limits  Other Psych:      Past Medical History:   Diagnosis Date    Desmoid fibromatosis     chest    Dysfunctional uterine bleeding 8/17/2011    GERD (gastroesophageal reflux disease)     Hyperlipemia     Hypertension     Insomnia     severe, sleep study normal 2011    Migraine 2/24/2012       Past Surgical History:   Procedure Laterality Date    CHEST SURGERY PROCEDURE UNLISTED      desmoid tumor    ENDOSCOPY, COLON, DIAGNOSTIC  5/2010    HX BREAST BIOPSY Right     benign    HX GYN      d and c    HX OTHER SURGICAL      tumor removal in chest removed       Allergies   Allergen Reactions    Erythromycin Nausea and Vomiting    Lisinopril Other (comments)     \"margoth achy\"       Family History   Problem Relation Age of Onset    Thyroid Disease Mother      goiter    Hypertension Father     Thyroid Disease Sister      nodules    Hypertension Sister     Cancer Maternal Grandmother      polycythemia    Cancer Paternal Grandmother      breast    Breast Cancer Paternal Grandmother     Cancer Paternal Grandfather      lung cancer    Other Sister      lyme disease    No Known Problems Sister        Social History   Substance Use Topics    Smoking status: Never Smoker    Smokeless tobacco: Never Used    Alcohol use Yes      Comment: rarely       Current Outpatient Prescriptions   Medication Sig Dispense Refill    rosuvastatin (CRESTOR) 10 mg tablet TAKE 1 TAB BY MOUTH NIGHTLY. 30 Tab 3    rosuvastatin (CRESTOR) 10 mg tablet Take 1 Tab by mouth nightly for 30 days. 30 Tab 5    hydroCHLOROthiazide (HYDRODIURIL) 25 mg tablet Take 1 Tab by mouth daily for 90 days. 90 Tab 0    potassium chloride (K-DUR, KLOR-CON) 10 mEq tablet Take 1 Tab by mouth two (2) times a day for 30 days. 60 Tab 3    multivitamin (ONE A DAY) tablet Take 1 Tab by mouth daily.  vit a,c & e-lutein-minerals (OCUVITE) tablet daily.       b complex vitamins tablet Take 1 Tab by mouth daily.  omega 3-dha-epa-fish oil (FISH OIL) 100-160-1,000 mg cap Take  by mouth.  rizatriptan (MAXALT-MLT) 10 mg disintegrating tablet Take 1 Tab by mouth once as needed for Migraine for up to 1 dose. 9 Tab 2    amitriptyline (ELAVIL) 10 mg tablet TAKE 1 TABLET BY MOUTH NIGHTLY. FOR MIGRAINE PREVENTION AND SLEEP 30 Tab 2    omeprazole (PRILOSEC OTC) 20 mg tablet Take 20 mg by mouth daily. 90 Tab 1         Plan:  Obtain authorization for testing from [facility]. Report to follow once testing, scoring, and interpretation completed. ? Organic based neurocognitive issues versus mood disorder or combination of same. ? Problems organic, functional, or both? This note will not be viewable in [address] 64year old female with progressive memory decline and other neurocognitive changes over the past few years. Noticed by spouse, family, and boss and worsening. She has anxiety secondary to this. There is a family history of dementia but not AD. Also has a history of depression and multiple losses/deaths in the family 20 years ago.

## 2017-08-11 ENCOUNTER — OFFICE VISIT (OUTPATIENT)
Dept: NEUROLOGY | Age: 57
End: 2017-08-11

## 2017-08-11 DIAGNOSIS — G31.84 MILD COGNITIVE IMPAIRMENT WITH MEMORY LOSS: Primary | ICD-10-CM

## 2017-08-11 DIAGNOSIS — F33.0 DEPRESSION, MAJOR, RECURRENT, MILD (HCC): ICD-10-CM

## 2017-08-11 DIAGNOSIS — F41.1 GENERALIZED ANXIETY DISORDER: ICD-10-CM

## 2017-08-16 NOTE — PROGRESS NOTES
[address] [facility],5Th [address]  [address]   [place] [address] [facility], [facility] [address]   [phone] Office   [phone] Fax      Neuropsychological Evaluation Report    Referral:  Diana Helms MD    Maurilio Espinosa is a 64 y.o. right handed  and remarried  female who was unaccompanied to the initial clinical interview on 7/28/17. Please refer to her medical records for details pertaining to her history. Briefly, the patient reported that she completed college. She denied any history of previously diagnosed LD and/or receipt of special education services. 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 She is . She just had her right cataract out. No history of previously diagnosed stroke, TBI, meningitis/encephalitis, LIANA Fever, Lupus, Lyme, brain tumor, toxic chemical exposure, seizures. She has a history of chronic migraines since about age 13 and is on medication for same. She is on amitryptiline by Dr. Sadi Arechiga, which makes her tired and sluggish. She has not slept well since she had her first child 35 years ago. She, her spouse, her boss have all noticed that she struggles with short term memory. This has been going on for a couple of years. She had a couple of migraines so bad she thought she had a stroke, but recent MRI was normal.  She forgets the content of conversations. Misplaces things. Starts tasks and does not complete. Losing words. Loses train of thought in conversations. She has also noticed struggles with attention and focus. These were not issues a few years ago. She has to write everything down. Jeanna Shaikh and spouse both told her she needs to see someone. Her father has dementia, but he had a stroke. Mother has dementia as well. Mother had remarried someone and he starved her and has neurocognitive residua from that, too (doesn't sound like AD).   When mother got to the hospital and staff asked spouse how he had let her get like that and he had an aneurysm and , right there. No changes in sense of taste or smell. She has a history of depression in the past, when she went on antidepressant when her first spouse . She took that medication for a year. He  at age 25 from Hodgkin's and  right after their second child was born. Twin sister's spouse . Another death in family as well. Those were bad years 20-30 years ago. She feels quite anxious about her memory loss. She is naturally a very happy person but this is bringing her down. She does smile 99% of the time, and this issue is the only thing that is really bothering her right now. No changes in sense of taste or smell. Appetite is too good. Marriage is going well. No changes in sex drive but has vaginal dryness, and I noted she should talk to Dr. Fabrizio Denton about it. Astroglide not helpful. She has pain in the neck and hurt her neck snow skiing, water skiing, whiplash injury. Always asks her spouse to pull on her neck. Having some sharp pain on the right side of her head, going on about six months, which happens once a week. Has seen a chiropractor in the past but doesn't like them adjusting their neck.         Neuropsychological Mental Status Exam (NMSE):  Historian: Good  Praxis: No UE apraxia  R/L Orientation: Intact to self and to other  Dress: within normal limits   Weight: Overweight  Appearance/Hygiene: within normal limits   Gait: within normal limits   Assistive Devices: Glasses  Mood: within normal limits   Affect: within normal limits   Comprehension: within normal limits   Thought Process: within normal limits   Expressive Language: within normal limits   Receptive Language: within normal limits   Motor:  No cognitive or motor perseveration  ETOH: One or two drinks a month  Tobacco: Never   Illicit: Marijuana in high school  SI/HI: Denied  Psychosis: Denied/    Insight: Within normal limits  Judgment: Within normal limits  Other Psych:      Past Medical History:   Diagnosis Date    Desmoid fibromatosis     chest    Dysfunctional uterine bleeding 8/17/2011    GERD (gastroesophageal reflux disease)     Hyperlipemia     Hypertension     Insomnia     severe, sleep study normal 2011    Migraine 2/24/2012       Past Surgical History:   Procedure Laterality Date    CHEST SURGERY PROCEDURE UNLISTED      desmoid tumor    ENDOSCOPY, COLON, DIAGNOSTIC  5/2010    HX BREAST BIOPSY Right     benign    HX GYN      d and c    HX OTHER SURGICAL      tumor removal in chest removed       Allergies   Allergen Reactions    Erythromycin Nausea and Vomiting    Lisinopril Other (comments)     \"margoth achy\"       Family History   Problem Relation Age of Onset    Thyroid Disease Mother      goiter    Hypertension Father     Thyroid Disease Sister      nodules    Hypertension Sister     Cancer Maternal Grandmother      polycythemia    Cancer Paternal Grandmother      breast    Breast Cancer Paternal Grandmother     Cancer Paternal Grandfather      lung cancer    Other Sister      lyme disease    No Known Problems Sister        Social History   Substance Use Topics    Smoking status: Never Smoker    Smokeless tobacco: Never Used    Alcohol use Yes      Comment: rarely       Current Outpatient Prescriptions   Medication Sig Dispense Refill    rosuvastatin (CRESTOR) 10 mg tablet TAKE 1 TAB BY MOUTH NIGHTLY. 30 Tab 3    rosuvastatin (CRESTOR) 10 mg tablet Take 1 Tab by mouth nightly for 30 days. 30 Tab 5    hydroCHLOROthiazide (HYDRODIURIL) 25 mg tablet Take 1 Tab by mouth daily for 90 days. 90 Tab 0    potassium chloride (K-DUR, KLOR-CON) 10 mEq tablet Take 1 Tab by mouth two (2) times a day for 30 days. 60 Tab 3    multivitamin (ONE A DAY) tablet Take 1 Tab by mouth daily.  vit a,c & e-lutein-minerals (OCUVITE) tablet daily.       b complex vitamins tablet Take 1 Tab by mouth daily.      omega 3-dha-epa-fish oil (FISH OIL) 100-160-1,000 mg cap Take  by mouth.  rizatriptan (MAXALT-MLT) 10 mg disintegrating tablet Take 1 Tab by mouth once as needed for Migraine for up to 1 dose. 9 Tab 2    amitriptyline (ELAVIL) 10 mg tablet TAKE 1 TABLET BY MOUTH NIGHTLY. FOR MIGRAINE PREVENTION AND SLEEP 30 Tab 2    omeprazole (PRILOSEC OTC) 20 mg tablet Take 20 mg by mouth daily. 90 Tab 1         Plan:  Obtain authorization for testing from [facility]. Report to follow once testing, scoring, and interpretation completed. ? Organic based neurocognitive issues versus mood disorder or combination of same. ? Problems organic, functional, or both? This note will not be viewable in [address] 64year old female with progressive memory decline and other neurocognitive changes over the past few years. Noticed by spouse, family, and boss and worsening. She has anxiety secondary to this. There is a family history of dementia but not AD. Also has a history of depression and multiple losses/deaths in the family 20 years ago. Neuropsychological Evaluation  Patient Testing 8/11/17 Report Completed 8/16/17  A Psychometrist Assisted w/ portions of this evaluation while under my direct supervision    Please refer to the patient's initial interview progress note (copied above) and her medical records for details pertaining to her history. Today's neuropsychological test scores follow: The following assessment procedures were performed:      Neuropsychologist Performed, Interpreted, & Reported: Neuropsychological Mental Status Exam, Revised Memory & Behavior Checklist, Mini Mental State Exam, Clock Drawing Test, Test Of Premorbid Functioning, José Luis-Melzack Pain Questionnaire,  History Taking  & Clinical Interview With The Patient, Review Of Available Records.     Psychometrist Administered & Neuropsychologist Interpreted & Neuropsychologist Reported: Finger Tapping Test, Grooved Pegboard Test, Speech-Sounds Perception Test, Venturepax, Wechsler Adult Intelligence Scale - IV, Verbal Fluency Tests, Gómez & Gómez - Revised, Trailmaking Test Parts A & B, [place] Verbal Learning Test - II, Jere Complex Figure Test, Rodriguez Depression Inventory - II, Rodriguez Anxiety Inventory, Personality Assessment Inventory. Computer Administered & Neuropsychologist Interpreted & Neuropsychologist Reported: Sanjeev Continuous Performance Test - III, WCST      Test Findings:  Note:  The patients raw data have been compared with currently available norms which include demographic corrections for age, gender, and/or education. Sometimes, the patients scores are compared to demographically similar individuals as close to the patients age, education level, etc., as possible. \"Average\" is viewed as being +/- 1 standard deviation (SD) from the stated mean for a particular test score. \"Low average\" is viewed as being between 1 and 2 SD below the mean, and above average is viewed as being 1 and 2 SD above the mean. Scores falling in the borderline range (between 1-1/2 and 2 SD below the mean) are viewed with particular attention as to whether they are normal or abnormal neurocognitive test scores. Other methods of inference in analyzing the test data are also utilized, including the pattern and range of scores in the profile, bilateral motor functions, and the presence, if any, of pathognomonic signs. Behaviorally, the patient was friendly and cooperative and appeared motivated to perform well during this examination. Within this context, the results of this evaluation are viewed as a valid reflection of the patients actual neurocognitive and emotional status. Her structured word list fluency, as assessed by the FAS Test, was within the average range with a T score of 52. Category fluency was within the average range with a T score of 54.   Confrontation naming ability, as assessed by the [place] Naming Test - Revised, was within the below average range at 54/60 correct (T = 41). This pattern of performance is not indicative of a patient who is at increased risk for day-to-day problems with verbal fluency and confrontation naming ability was also normal. .       The patient was administered the Sanjeev Continuous Performance Test - III, a computer-administered test of sustained attention, and review of the subscales within this instrument did not reveal clinically significant concerns for inattentiveness or impulsivity. Verbal auditory attention and discrimination, as assessed by the Speech-Sounds Perception Test (T = 46) was within the average range. Nonverbal auditory attention and discrimination, as assessed by the [facility] Rhythm Test (T = 39) was within the mildly impaired range. This pattern of performance is  indicative of a patient who is at increased risk for day-to-day problems with nonverbal auditory attention. The problem is quite mild. Her sustained visual attention/concentration and verbal auditory attention and discrimination related abilities were normal.       The patient was administered the Wechsler Adult Intelligence Scale - IV. See Appendix I for full breakdown of IQ test scores (scanned into media section of this EMR). As can be seen, there was a clinically significant difference between her average range Working Memory Index score of 95 (37th %ile) and her high average range Processing Speed Index score of 111 (77th  %ile). Her Verbal Comprehension Index score of 100 was within the average range (50th %ile). Her Perceptual Reasoning Index score of 102 (55th %ile) was within the average range. This pattern of performance is not indicative of a patient who is at increased risk for day-to-day problems with working memory and/or processing speed.   Scores are commensurate with what would be expected based on her performance on a task estimating premorbid functioning levels. The patient was administered the [place] Verbal Learning Test  - II and generated an impaired range and positive curve over five repeated auditory word list learning trials. An interference trial was within the borderline range. Free and cued, short and long delayed recall were all impaired, though she benefits some from cures. Recognition recall was normal, but a high level of patient intrusion errors are noted. Forced choice recall was normal, suggesting that she put forth good effort on this test.  This pattern of performance is indicative of a patient who is at increased risk for day-to-day problems with auditory learning and memory. The patients performance on the copy portion of the Jere Complex Figure Test was impaired (<1st %ile). Recall for the complex design was within the impaired range after both short (<1st %ile) and long (<1st %ile) delays. Recognition recall was impaired (<1st %ile). his pattern of performance is indicative of a patient who is at increased risk for day-to-day problems with visual organization and visual delayed memory. Simple timed visual motor sequencing (Trailmaking Test Part A) was within the average range with a T score of 46. Her performance on a similar, but more complex task of timed visual motor sequencing (Trailmaking Test Part B) was within the below average range with a T score of 42. She made zero sequencing errors on this latter test.  On additional assessment of executive functioning [facility]), the patientcompleted 2/6 categories on this test.  Taken together, this pattern of performance is not indicative of a patient who is at increased risk for day-to-day problems with executive functioning. Motor speed for finger tapping was within the normal range bilaterally. Fine motor dexterity, as assessed by the [facility], was within the mildly to moderately impaired range bilaterally.   This is a mixed pattern of performance with respect to motor skills. The patient rated her current level of pain as \"1/5 - Mild\" on the José Luis-Melzack Pain Questionnaire. She reported pain in her right temple. Kanwalceci Diazbobbi Her Rodriguez Depression Inventory -II score of 9 was within the mildly depressed range. Her Rodriguez Anxiety Inventory score of 12 reflected mild anxiety. The patient was also administered the Personality Assessment Inventory and generated a valid profile for interpretation. Within this context, there is support for mild depression and anxiety and the personality profile is otherwise normal.        Impressions & Recommendations: This patient generated a mixed normal/abnormal range Neuropsychological Evaluation with respect to neurocognitive functioning. In this regard, impairments are noted for auditory learning, auditory memory, visual organization, visual memory, bilateral fine motor dexterity, and she has a very mild problem with attention. Otherwise, her mental status, verbal fluency, confrontation naming, verbal comprehension, perceptual reasoning, working memory, processing speed, executive functioning, and bilateral motor speed remain from normal.  From an emotional standpoint, she reports mild depression and anxiety. She has dealt with quite a bit of loss and trauma in her life. There is a definite organic quality to this test performance, and the memory problems are not sufficiently explained by attention and/or mood related concerns. She is young for dementia but has a strong family history of same. I would call this Mild Cognitive Impairment with Memory Loss and suggest consideration for medication for memory as well as psychiatric treatment and counseling for mood related concerns. The memory loss issue is mild, but it is present and concerning. I would like to re-evaluate her in six months to do a test by test comparison and track for any progression.  If it worsens over time, then this is a very early dementia type process. This would be AD, not vascular, in my opinion. The profile is not consistent with pseudodementia. I see that she put forth good effort on this tests. She is competent, but needs supervision for medications and finances. This memory issue will definitely impact her ability to safely and effectively work without extra time to complete tasks, repetition, repeated instructions, written reminders, and reduced distraction. Follow up in six months for diagnostic clarification. Baseline now established. I would refer her to Neurology as well. Clinical correlation is strongly advised. I will discuss these findings with the patient when she follows up with me in the near future. A follow up Neuropsychological Evaluation is indicated on a prn basis, especially if there are any cognitive and/or emotional changes. DIAGNOSES:  Mild Cognitive Impairment w/ Memory Loss - Mild     Depression - Mild     Anxiety - Mild     The above information is based upon information currently available to me. If there is any additional information of which I am currently unaware, I would be more than happy to review it upon having it made available to me. Thank you for the opportunity to see this interesting individual.     Sincerely,       Penny Elena. Vivian Martines PsyD, EdS    CC: Carrie Stallings MD     2 units -[id number]-  1.5 hours. Record review. Review of history provided by patient. Review of collaborative information. Testing by Clinician. Review of raw data. Scoring. Report writing of individual tests administered by Clinician. Integration of individual tests administered by psychometrist (that were previously reported and billed under psychometry code below) with testing by clinician and review of records/history/collaborative information. Case Conceptualization, Report writing. Coordination Of Care. 5 units  -50928 - 4.5 hours.   Psychometrist test prep, administration, and scoring under clinician's direct supervision. Clinical interpretation of individual tests administered by psychometrist .  Clinician report of individual tests administered by psychometrist.    1 unit - 21267 - 1.5 hours. Computer testing and scoring and clinician's interpretation of computer-administered test(s)    \"Unit\" is defined by CPT/National Guidelines (31 - 60 minutes). Integral services including scoring of raw data, data interpretation, case conceptualization, report writing etcetera were initiated after the patient finished testing/raw data collected and was completed on the date the report was signed.

## 2017-08-18 ENCOUNTER — OFFICE VISIT (OUTPATIENT)
Dept: INTERNAL MEDICINE CLINIC | Age: 57
End: 2017-08-18

## 2017-08-18 VITALS
WEIGHT: 193.4 LBS | TEMPERATURE: 98.8 F | BODY MASS INDEX: 35.59 KG/M2 | DIASTOLIC BLOOD PRESSURE: 79 MMHG | HEART RATE: 98 BPM | OXYGEN SATURATION: 97 % | RESPIRATION RATE: 14 BRPM | SYSTOLIC BLOOD PRESSURE: 122 MMHG | HEIGHT: 62 IN

## 2017-08-18 DIAGNOSIS — E78.5 HYPERLIPIDEMIA LDL GOAL <130: ICD-10-CM

## 2017-08-18 DIAGNOSIS — I10 ESSENTIAL HYPERTENSION: Chronic | ICD-10-CM

## 2017-08-18 DIAGNOSIS — G43.919 INTRACTABLE MIGRAINE WITHOUT STATUS MIGRAINOSUS, UNSPECIFIED MIGRAINE TYPE: Chronic | ICD-10-CM

## 2017-08-18 DIAGNOSIS — Z01.810 PREOP CARDIOVASCULAR EXAM: Primary | ICD-10-CM

## 2017-08-18 RX ORDER — RIZATRIPTAN BENZOATE 10 MG/1
10 TABLET, ORALLY DISINTEGRATING ORAL
Qty: 9 TAB | Refills: 2 | Status: SHIPPED | OUTPATIENT
Start: 2017-08-18 | End: 2018-05-02 | Stop reason: SDUPTHER

## 2017-08-18 NOTE — PROGRESS NOTES
HISTORY OF PRESENT ILLNESS  Mary Gonzalez is a 64 y.o. female. HPI   Presents today for pre op for cataract surgery. Patient reports she is doing better on the Prilosec. Patient reports she does not have as many headaches as she used to. She states she recently had a migraine. Patient notes she is not taking aspirin. She denies a latex allergy. Hyperlipidemia:  Cardiovascular risk analysis - 64 y.o. female LDL goal is under 130. ROS: taking medications as instructed, no medication side effects noted, no TIA's, no chest pain on exertion, no dyspnea on exertion, no swelling of ankles. Tolerating meds, no myalgias or other side effects noted  New concerns: Patient cholesterol levels was 283 and LDL was 209 on 3/24/2017. She states she has been tolerating Crestor. Hypertension ROS: taking medications as instructed, no medication side effects noted, no TIA's, no chest pain on exertion, no dyspnea on exertion, no swelling of ankles. New concerns:  Patient's BP in office today is 122/79. Review of Systems   All other systems reviewed and are negative. Physical Exam   Constitutional: She is oriented to person, place, and time. She appears well-developed and well-nourished. HENT:   Head: Normocephalic and atraumatic. Right Ear: External ear normal.   Left Ear: External ear normal.   Nose: Nose normal.   Mouth/Throat: Oropharynx is clear and moist.   Eyes: Conjunctivae and EOM are normal.   Neck: Normal range of motion. Neck supple. Carotid bruit is not present. No thyroid mass and no thyromegaly present. Cardiovascular: Normal rate, regular rhythm, S1 normal, S2 normal, normal heart sounds and intact distal pulses. Pulmonary/Chest: Effort normal and breath sounds normal.   Abdominal: Soft. Normal appearance and bowel sounds are normal. There is no hepatosplenomegaly. There is no tenderness. Musculoskeletal: Normal range of motion.    Neurological: She is alert and oriented to person, place, and time. She has normal strength. No cranial nerve deficit or sensory deficit. Coordination normal.   Skin: Skin is warm, dry and intact. No abrasion and no rash noted. Psychiatric: She has a normal mood and affect. Her behavior is normal. Judgment and thought content normal.   Nursing note and vitals reviewed. ASSESSMENT and PLAN  Diagnoses and all orders for this visit:    1. Preop cardiovascular exam  Patient is clear for surgery. 2. Intractable migraine without status migrainosus, unspecified migraine type  Patient managing migraines with Maxalt. -     rizatriptan (MAXALT-MLT) 10 mg disintegrating tablet; Take 1 Tab by mouth once as needed for Migraine for up to 1 dose. 3. Essential hypertension  BP is at goal. I do not recommend any change in medications.  -     METABOLIC PANEL, COMPREHENSIVE    4. Hyperlipidemia LDL goal <130  Patient cholesterol levels was 283 and LDL was 209 on 3/24/2017.  -     LIPID PANEL      lab results and schedule of future lab studies reviewed with patient  reviewed diet, exercise and weight control    Written by Soha Yin, as dictated by Elenita Pelletier MD.     Current diagnosis and concerns discussed with pt at length. Understands risks and benefits or current treatment plan and medications and accepts the treatment and medication with any possible risks. Pt asks appropriate questions which were answered. Pt instructed to call with any concerns or problems.

## 2017-08-19 LAB
ALBUMIN SERPL-MCNC: 4.7 G/DL (ref 3.5–5.5)
ALBUMIN/GLOB SERPL: 1.6 {RATIO} (ref 1.2–2.2)
ALP SERPL-CCNC: 72 IU/L (ref 39–117)
ALT SERPL-CCNC: 66 IU/L (ref 0–32)
AST SERPL-CCNC: 36 IU/L (ref 0–40)
BILIRUB SERPL-MCNC: 0.8 MG/DL (ref 0–1.2)
BUN SERPL-MCNC: 15 MG/DL (ref 6–24)
BUN/CREAT SERPL: 17 (ref 9–23)
CALCIUM SERPL-MCNC: 9.9 MG/DL (ref 8.7–10.2)
CHLORIDE SERPL-SCNC: 98 MMOL/L (ref 96–106)
CHOLEST SERPL-MCNC: 170 MG/DL (ref 100–199)
CO2 SERPL-SCNC: 27 MMOL/L (ref 18–29)
CREAT SERPL-MCNC: 0.9 MG/DL (ref 0.57–1)
GLOBULIN SER CALC-MCNC: 2.9 G/DL (ref 1.5–4.5)
GLUCOSE SERPL-MCNC: 96 MG/DL (ref 65–99)
HDLC SERPL-MCNC: 43 MG/DL
INTERPRETATION, 910389: NORMAL
LDLC SERPL CALC-MCNC: 85 MG/DL (ref 0–99)
POTASSIUM SERPL-SCNC: 3.8 MMOL/L (ref 3.5–5.2)
PROT SERPL-MCNC: 7.6 G/DL (ref 6–8.5)
SODIUM SERPL-SCNC: 141 MMOL/L (ref 134–144)
TRIGL SERPL-MCNC: 210 MG/DL (ref 0–149)
VLDLC SERPL CALC-MCNC: 42 MG/DL (ref 5–40)

## 2017-09-05 RX ORDER — ROSUVASTATIN CALCIUM 10 MG/1
10 TABLET, COATED ORAL
Qty: 90 TAB | Refills: 1 | Status: SHIPPED | OUTPATIENT
Start: 2017-09-05 | End: 2018-05-21 | Stop reason: SDUPTHER

## 2017-09-17 RX ORDER — HYDROCHLOROTHIAZIDE 25 MG/1
TABLET ORAL
Qty: 90 TAB | Refills: 0 | Status: SHIPPED | OUTPATIENT
Start: 2017-09-17 | End: 2018-01-12 | Stop reason: SDUPTHER

## 2017-09-17 RX ORDER — AMITRIPTYLINE HYDROCHLORIDE 10 MG/1
TABLET, FILM COATED ORAL
Qty: 90 TAB | Refills: 0 | Status: SHIPPED | OUTPATIENT
Start: 2017-09-17 | End: 2018-01-12 | Stop reason: SDUPTHER

## 2017-10-13 ENCOUNTER — OFFICE VISIT (OUTPATIENT)
Dept: NEUROLOGY | Age: 57
End: 2017-10-13

## 2017-10-13 DIAGNOSIS — G31.84 MILD COGNITIVE IMPAIRMENT WITH MEMORY LOSS: Primary | ICD-10-CM

## 2017-10-13 DIAGNOSIS — R41.89 IMPAIRMENT OF COMPREHENSION: ICD-10-CM

## 2017-10-13 DIAGNOSIS — F33.0 DEPRESSION, MAJOR, RECURRENT, MILD (HCC): ICD-10-CM

## 2017-10-13 DIAGNOSIS — F41.1 GENERALIZED ANXIETY DISORDER: ICD-10-CM

## 2017-10-13 NOTE — PROGRESS NOTES
Office feedback session with the patient today. I reviewed the results of the recent Neuropsychological Evaluation, including discussing individual tests as well as patient's areas of neurocognitive strength versus weakness. Education was provided regarding my diagnostic impressions, and we discussed treatment plan/options. I also answered numerous questions related to the clinical findings, including discussing various methods to improve cognition and mood. Counseling provided regarding mood and cognition. We talked about MCI and possible transition into dementia exacerbated by mild mood problems and anxiety. Patient voiced understanding. Needs to see neurology or follow up with PCP. Neuro would be a wilcox idea, though. CBT and supportive psychotherapy techniques were utilized. The patient will follow up with the referring provider, and reported being very pleased with the services provided. [facility] 20 minutes with patient, record review, coordination of care. Records provided. We discussed: This patient generated a mixed normal/abnormal range Neuropsychological Evaluation with respect to neurocognitive functioning. In this regard, impairments are noted for auditory learning, auditory memory, visual organization, visual memory, bilateral fine motor dexterity, and she has a very mild problem with attention. Otherwise, her mental status, verbal fluency, confrontation naming, verbal comprehension, perceptual reasoning, working memory, processing speed, executive functioning, and bilateral motor speed remain from normal.  From an emotional standpoint, she reports mild depression and anxiety. She has dealt with quite a bit of loss and trauma in her life.                             There is a definite organic quality to this test performance, and the memory problems are not sufficiently explained by attention and/or mood related concerns.   She is young for dementia but has a strong family history of same. I would call this Mild Cognitive Impairment with Memory Loss and suggest consideration for medication for memory as well as psychiatric treatment and counseling for mood related concerns. The memory loss issue is mild, but it is present and concerning. I would like to re-evaluate her in six months to do a test by test comparison and track for any progression. If it worsens over time, then this is a very early dementia type process. This would be AD, not vascular, in my opinion. The profile is not consistent with pseudodementia. I see that she put forth good effort on this tests. She is competent, but needs supervision for medications and finances. This memory issue will definitely impact her ability to safely and effectively work without extra time to complete tasks, repetition, repeated instructions, written reminders, and reduced distraction. Follow up in six months for diagnostic clarification. Baseline now established. I would refer her to Neurology as well. Clinical correlation is strongly advised.                             I will discuss these findings with the patient when she follows up with me in the near future.   A follow up Neuropsychological Evaluation is indicated on a prn basis, especially if there are any cognitive and/or emotional changes.       DIAGNOSES:                                 Mild Cognitive Impairment w/ Memory Loss - Mild                                                                    Depression - Mild                                                                    Anxiety - Mild

## 2017-10-20 ENCOUNTER — OFFICE VISIT (OUTPATIENT)
Dept: NEUROLOGY | Age: 57
End: 2017-10-20

## 2017-10-20 ENCOUNTER — OFFICE VISIT (OUTPATIENT)
Dept: INTERNAL MEDICINE CLINIC | Age: 57
End: 2017-10-20

## 2017-10-20 VITALS
RESPIRATION RATE: 16 BRPM | HEIGHT: 62 IN | HEART RATE: 99 BPM | TEMPERATURE: 98.6 F | DIASTOLIC BLOOD PRESSURE: 77 MMHG | OXYGEN SATURATION: 96 % | SYSTOLIC BLOOD PRESSURE: 122 MMHG

## 2017-10-20 VITALS
RESPIRATION RATE: 16 BRPM | DIASTOLIC BLOOD PRESSURE: 88 MMHG | OXYGEN SATURATION: 96 % | HEIGHT: 62 IN | SYSTOLIC BLOOD PRESSURE: 138 MMHG | HEART RATE: 115 BPM

## 2017-10-20 DIAGNOSIS — F41.9 ANXIETY: Primary | ICD-10-CM

## 2017-10-20 DIAGNOSIS — G43.919 INTRACTABLE MIGRAINE WITHOUT STATUS MIGRAINOSUS, UNSPECIFIED MIGRAINE TYPE: Chronic | ICD-10-CM

## 2017-10-20 DIAGNOSIS — G31.84 MILD COGNITIVE IMPAIRMENT WITH MEMORY LOSS: Primary | ICD-10-CM

## 2017-10-20 DIAGNOSIS — I10 ESSENTIAL HYPERTENSION: Chronic | ICD-10-CM

## 2017-10-20 DIAGNOSIS — E78.5 HYPERLIPIDEMIA LDL GOAL <130: ICD-10-CM

## 2017-10-20 DIAGNOSIS — G43.009 MIGRAINE WITHOUT AURA AND WITHOUT STATUS MIGRAINOSUS, NOT INTRACTABLE: ICD-10-CM

## 2017-10-20 RX ORDER — BUPROPION HYDROCHLORIDE 150 MG/1
150 TABLET ORAL
Qty: 30 TAB | Refills: 3 | Status: SHIPPED | OUTPATIENT
Start: 2017-10-20 | End: 2018-01-02 | Stop reason: ALTCHOICE

## 2017-10-20 RX ORDER — DONEPEZIL HYDROCHLORIDE 10 MG/1
10 TABLET, FILM COATED ORAL
Qty: 30 TAB | Refills: 5 | Status: SHIPPED | OUTPATIENT
Start: 2017-10-20 | End: 2017-12-01 | Stop reason: SDUPTHER

## 2017-10-20 NOTE — LETTER
Dear Argenis Alston MD, Thank you for allowing me to see your patient, Kiersten Patel for a neurological consultation. Please see my impression and recommendations as outlined in my note. Sincerely, Macario Humphreys MD 
[facility] at [address] [place] [address] made to patient regarding current medications. Patient states \"if you them they are in My Chart\". Medication list requested from patient. Patient stated \"I don't have a list\". Kiersten Patel is a 62 y.o. female Chief Complaint Patient presents with  Memory Loss  
  onset about a year ago 1. Have you been to the ER, urgent care clinic since your last visit? Hospitalized since your last visit? No 
 
2. Have you seen or consulted any other health care providers outside of the [address] [place] [address] since your last visit? Include any pap smears or colon screening. No 
Visit Vitals  /88 (BP 1 Location: Left arm, BP Patient Position: Sitting)  Pulse (!) 115  Resp 16  
 Ht 5' 2\" (1.575 m)  SpO2 96% NEUROLOGY NEW PATIENT CONSULTATION 
 
REFERRED BY: 
Argenis Alston MD 
 
CHIEF COMPLAINT: 
Memory loss HISTORY OF PRESENT ILLNESS HISTORY PROVIDED BY: 
Patient Kiersten Patel is a 62 y.o. female who I am asked to see in consultation for short-term memory loss. Patient notes that she has been having memory issues for about 6 months. She notes that she repeats herself. She reports that people tell her that she is asked questions multiple times. 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 Her boss has noted that she will not repeat herself. Patient reports that she uses a lot of sticky notes and this helps her to keep track of what she is doing. Overall she is able to continue her job function without any difficulty. Patient does live with her .   They also take care of her father-in-law. She has 4 children and 8 grandchildren. None of these are at home. 1 of her children has noticed that she is repeating herself at times. Her  does not seem to notice any major changes. Patient does cooking and cleaning with no issues. She also manages the finances. She checks her balance daily and monitors her money closely. She also has a history of migraine headaches. She has a strong family history of this as well. Patient's first migraine was at age 15. She then suffered from ocular migraines. She has now been started on amitriptyline 10 mg nightly. With this she has stopped having ocular migraines. She will not does have a rare migraine. This is described as unilateral head pain with associated nausea/phono photophobia. It is treated with Maxalt with no issues. Patient does feel tired when she takes amitriptyline and she questions if this could be contributing some of her memory difficulty. She has no issues with driving. She denies hallucinations. She did have neuropsych testing that showed possible mild cognitive impairment. It was recommended that she start her medication. Patient has an MRI of the brain is negative. She had a normal B12 and TSH. PMH Past Medical History:  
Diagnosis Date  Anxiety  Desmoid fibromatosis   
 chest  
 Dysfunctional uterine bleeding 8/17/2011  GERD (gastroesophageal reflux disease)  Hyperlipemia  Hypertension  Insomnia   
 severe, sleep study normal 2011  Migraine 2/24/2012 [address] Social History Social History  Marital status:  Spouse name: N/A  
 Number of children: N/A  
 Years of education: N/A Social History Main Topics  Smoking status: Never Smoker  Smokeless tobacco: Never Used  Alcohol use Yes Comment: rarely  Drug use: No  
 Sexual activity: Yes  
  Partners: Male Other Topics Concern  None Social History Narrative [facility] 
 Family History Problem Relation Age of Onset  Thyroid Disease Mother   
  goiter  Hypertension Father  Stroke Father  Thyroid Disease Sister   
  nodules  Hypertension Sister  Cancer Maternal Grandmother   
  polycythemia  Cancer Paternal Grandmother   
  breast  
 Breast Cancer Paternal Grandmother  Cancer Paternal Grandfather   
  lung cancer  Other Sister   
  lyme disease  No Known Problems Sister ALLERGIES Allergies Allergen Reactions  Erythromycin Nausea and Vomiting  Lisinopril Other (comments) \"margoth achy\" CURRENT MEDS Current Outpatient Prescriptions Medication Sig Dispense Refill  buPROPion XL (WELLBUTRIN XL) 150 mg tablet Take 1 Tab by mouth every morning. 30 Tab 3  
 donepezil (ARICEPT) 10 mg tablet Take 1 Tab by mouth nightly. 30 Tab 5  
 KLOR-CON M10 10 mEq tablet TAKE 1 TABLET BY MOUTH TWO (2) TIMES A DAY FOR 30 DAYS. 60 Tab 0  
 hydroCHLOROthiazide (HYDRODIURIL) 25 mg tablet TAKE 1 TAB BY MOUTH DAILY FOR 90 DAYS. 90 Tab 0  
 amitriptyline (ELAVIL) 10 mg tablet TAKE 1 TAB BY MOUTH NIGHTLY FOR 90 DAYS. 90 Tab 0  
 rosuvastatin (CRESTOR) 10 mg tablet Take 1 Tab by mouth nightly. 90 Tab 1  rizatriptan (MAXALT-MLT) 10 mg disintegrating tablet Take 1 Tab by mouth once as needed for Migraine for up to 1 dose. 9 Tab 2  
 multivitamin (ONE A DAY) tablet Take 1 Tab by mouth daily.  vit a,c & e-lutein-minerals (OCUVITE) tablet daily.  b complex vitamins tablet Take 1 Tab by mouth daily.  omega 3-dha-epa-fish oil (FISH OIL) 100-160-1,000 mg cap Take  by mouth.  omeprazole (PRILOSEC OTC) 20 mg tablet Take 20 mg by mouth daily. 90 Tab 1  
 rosuvastatin (CRESTOR) 10 mg tablet Take 1 Tab by mouth nightly for 30 days. 30 Tab 5 REVIEW OF SYSTEMS:  
 
Y  N       Y  N  Y  N   Y  N 
  AIDS            Falls    Memory Loss     Shortness of breath Anxiety            Fatigue   Muscle Pain           Skipped beats Chest Pain     Frequent HA   Ms Weakness        Snoring Constipation  Hearing loss   Nause/Vomiting     Stomach Pain Cough           Hepatitis   Neuropathy            Swallowing difficulty Depression   Incontinence   Poor appetite         Vertigo Diarrhea         Joint Pain   Rash                      Visual disturbances Fainting          Leg Swelling   Ringing ears          Weight changes Unable to obtain  ROS due to  mental status change  sedated   intubated PREVIOUS WORKUP IMAGING: MRI of the brain: Normal 
(I personally reviewed these images in PACS and this is my impression) LABS-TSH and B12 within normal limits Results for orders placed or performed in visit on 08/18/17 METABOLIC PANEL, COMPREHENSIVE Result Value Ref Range Glucose 96 65 - 99 mg/dL BUN 15 6 - 24 mg/dL Creatinine 0.90 0.57 - 1.00 mg/dL GFR est non-AA 72 >59 mL/min/1.73 GFR est AA 83 >59 mL/min/1.73  
 BUN/Creatinine ratio 17 9 - 23 Sodium 141 134 - 144 mmol/L Potassium 3.8 3.5 - 5.2 mmol/L Chloride 98 96 - 106 mmol/L  
 CO2 27 18 - 29 mmol/L Calcium 9.9 8.7 - 10.2 mg/dL Protein, total 7.6 6.0 - 8.5 g/dL Albumin 4.7 3.5 - 5.5 g/dL GLOBULIN, TOTAL 2.9 1.5 - 4.5 g/dL A-G Ratio 1.6 1.2 - 2.2 Bilirubin, total 0.8 0.0 - 1.2 mg/dL Alk. phosphatase 72 39 - 117 IU/L  
 AST (SGOT) 36 0 - 40 IU/L  
 ALT (SGPT) 66 (H) 0 - 32 IU/L  
LIPID PANEL Result Value Ref Range Cholesterol, total 170 100 - 199 mg/dL Triglyceride 210 (H) 0 - 149 mg/dL HDL Cholesterol 43 >39 mg/dL VLDL, calculated 42 (H) 5 - 40 mg/dL LDL, calculated 85 0 - 99 mg/dL CVD REPORT Result Value Ref Range INTERPRETATION Note Memory loss: This patient generated a mixed normal/abnormal range Neuropsychological Evaluation with respect to neurocognitive functioning.   In this regard, impairments are noted for auditory learning, auditory memory, visual organization, visual memory, bilateral fine motor dexterity, and she has a very mild problem with attention. Otherwise, her mental status, verbal fluency, confrontation naming, verbal comprehension, perceptual reasoning, working memory, processing speed, executive functioning, and bilateral motor speed remain from normal.  From an emotional standpoint, she reports mild depression and anxiety. She has dealt with quite a bit of loss and trauma in her life.   
  
                      There is a definite organic quality to this test performance, and the memory problems are not sufficiently explained by attention and/or mood related concerns. She is young for dementia but has a strong family history of same. I would call this Mild Cognitive Impairment with Memory Loss and suggest consideration for medication for memory as well as psychiatric treatment and counseling for mood related concerns. The memory loss issue is mild, but it is present and concerning. I would like to re-evaluate her in six months to do a test by test comparison and track for any progression. If it worsens over time, then this is a very early dementia type process. This would be AD, not vascular, in my opinion. The profile is not consistent with pseudodementia. I see that she put forth good effort on this tests. She is competent, but needs supervision for medications and finances. This memory issue will definitely impact her ability to safely and effectively work without extra time to complete tasks, repetition, repeated instructions, written reminders, and reduced distraction. Follow up in six months for diagnostic clarification. Baseline now established. I would refer her to Neurology as well. Clinical correlation is strongly advised. PHYSICAL EXAM 
Visit Vitals  /88 (BP 1 Location: Left arm, BP Patient Position: Sitting)  Pulse (!) 115  Resp 16  
 Ht 5' 2\" (1.575 m)  LMP 11/07/2010  SpO2 96% General:  Alert, cooperative, no distress. Head:  Normocephalic, without obvious abnormality, atraumatic. Eyes:  Conjunctivae/corneas clear. Pupils equal, round, reactive to light. Extraocular movements intact, VFF, NO papilledema Lungs: 
Heart:   Non labored breathing Regular rate and rhythm, no carotid bruits Abdomen:   Soft, non-distended Extremities: Extremities normal, atraumatic, no cyanosis or edema. Pulses: 2+ and symmetric all extremities. Skin: Skin color, texture, turgor normal. No rashes or lesions. Neurologic:  Gen: Attention normal 
           Language: naming, repetition, fluency normal 
           Memory: intact recent and remote memory Cranial Nerves: 
I: smell Not tested II: visual fields Full to confrontation II: pupils Equal, round, reactive to light II: optic disc No papilledema III,VII: ptosis none III,IV,VI: extraocular muscles  Full ROM V: mastication normal  
V: facial light touch sensation  normal  
VII: facial muscle function   symmetric VIII: hearing symmetric IX: soft palate elevation  normal  
XI: trapezius strength  5/5 XI: sternocleidomastoid strength 5/5 XI: neck flexion strength  5/5 XII: tongue  midline Motor: normal bulk and tone, no tremor Strength: 5/5 all four extremities Sensory: intact to LT, PP, vibration, and temperature Coordination: FTN intact, Rhomberg negative Gait: normal gait including tandem Reflexes: 2+ throughout IMPRESSION Hailey Patel is a 62 y.o. female who presents for evaluation of memory loss. According to neuropsych she has been diagnosed with mild cognitive impairment. Reversible causes with an MRI of the brain and labs have been negative. Discussed mild cognitive impairment and ramifications. At this point I would start patient on Aricept and she is willing to do so. RECOMMENDATIONS 1. Start Aricept 10 mg nightly. Side effects discussed. Information given to patient 2.  Decrease amitriptyline to 5 mg nightly to see this helps with fatigue but still helps with migraine 3. Continue Maxalt for abortive for migraine 4. Discussed mild cognitive impairment and dementia and detail 5. Encourage patient to remain physically, socially, and mentally active 6. Neuropsych as above. May need to repeat in 6 months FU 3 months Parmjit Kaur MD 
 
CC: Leo Garcia MD 
Fax: [phone] This note was created using voice recognition software. Despite editing, there may be syntax errors. This note will not be viewable in [address]

## 2017-10-20 NOTE — PROGRESS NOTES
Ebony Espinoza is a 62 y.o. female  Chief Complaint   Patient presents with    Memory Loss     onset about a year ago     1. Have you been to the ER, urgent care clinic since your last visit? Hospitalized since your last visit? No    2. Have you seen or consulted any other health care providers outside of the [address] [place] [address] since your last visit? Include any pap smears or colon screening.  No  Visit Vitals    /88 (BP 1 Location: Left arm, BP Patient Position: Sitting)    Pulse (!) 115    Resp 16    Ht 5' 2\" (1.575 m)    SpO2 96%

## 2017-10-20 NOTE — PROGRESS NOTES
Inquiry made to patient regarding current medications. Patient states \"if you them they are in My Chart\". Medication list requested from patient. Patient stated \"I don't have a list\".

## 2017-10-20 NOTE — PROGRESS NOTES
NEUROLOGY NEW PATIENT CONSULTATION    REFERRED BY:  Melani Becerril MD    CHIEF COMPLAINT:  Memory loss    HISTORY OF PRESENT ILLNESS    HISTORY PROVIDED BY:  Patient      Dez Saunders is a 62 y.o. female who I am asked to see in consultation for short-term memory loss. Patient notes that she has been having memory issues for about 6 months. She notes that she repeats herself. She reports that people tell her that she is asked questions multiple times. 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 Her boss has noted that she will not repeat herself. Patient reports that she uses a lot of sticky notes and this helps her to keep track of what she is doing. Overall she is able to continue her job function without any difficulty. Patient does live with her . They also take care of her father-in-law. She has 4 children and 8 grandchildren. None of these are at home. 1 of her children has noticed that she is repeating herself at times. Her  does not seem to notice any major changes. Patient does cooking and cleaning with no issues. She also manages the finances. She checks her balance daily and monitors her money closely. She also has a history of migraine headaches. She has a strong family history of this as well. Patient's first migraine was at age 15. She then suffered from ocular migraines. She has now been started on amitriptyline 10 mg nightly. With this she has stopped having ocular migraines. She will not does have a rare migraine. This is described as unilateral head pain with associated nausea/phono photophobia. It is treated with Maxalt with no issues. Patient does feel tired when she takes amitriptyline and she questions if this could be contributing some of her memory difficulty. She has no issues with driving. She denies hallucinations. She did have neuropsych testing that showed possible mild cognitive impairment. It was recommended that she start her medication. Patient has an MRI of the brain is negative. She had a normal B12 and TSH. PMH  Past Medical History:   Diagnosis Date    Anxiety     Desmoid fibromatosis     chest    Dysfunctional uterine bleeding 8/17/2011    GERD (gastroesophageal reflux disease)     Hyperlipemia     Hypertension     Insomnia     severe, sleep study normal 2011    Migraine 2/24/2012       SH  Social History     Social History    Marital status:      Spouse name: N/A    Number of children: N/A    Years of education: N/A     Social History Main Topics    Smoking status: Never Smoker    Smokeless tobacco: Never Used    Alcohol use Yes      Comment: rarely    Drug use: No    Sexual activity: Yes     Partners: Male     Other Topics Concern    None     Social History Narrative       FH  Family History   Problem Relation Age of Onset    Thyroid Disease Mother      goiter    Hypertension Father     Stroke Father     Thyroid Disease Sister      nodules    Hypertension Sister     Cancer Maternal Grandmother      polycythemia    Cancer Paternal Grandmother      breast    Breast Cancer Paternal Grandmother     Cancer Paternal Grandfather      lung cancer    Other Sister      lyme disease    No Known Problems Sister        ALLERGIES  Allergies   Allergen Reactions    Erythromycin Nausea and Vomiting    Lisinopril Other (comments)     \"margoth achy\"       CURRENT MEDS  Current Outpatient Prescriptions   Medication Sig Dispense Refill    buPROPion XL (WELLBUTRIN XL) 150 mg tablet Take 1 Tab by mouth every morning. 30 Tab 3    donepezil (ARICEPT) 10 mg tablet Take 1 Tab by mouth nightly. 30 Tab 5    KLOR-CON M10 10 mEq tablet TAKE 1 TABLET BY MOUTH TWO (2) TIMES A DAY FOR 30 DAYS. 60 Tab 0    hydroCHLOROthiazide (HYDRODIURIL) 25 mg tablet TAKE 1 TAB BY MOUTH DAILY FOR 90 DAYS. 90 Tab 0    amitriptyline (ELAVIL) 10 mg tablet TAKE 1 TAB BY MOUTH NIGHTLY FOR 90 DAYS. 90 Tab 0    rosuvastatin (CRESTOR) 10 mg tablet Take 1 Tab by mouth nightly. 90 Tab 1    rizatriptan (MAXALT-MLT) 10 mg disintegrating tablet Take 1 Tab by mouth once as needed for Migraine for up to 1 dose. 9 Tab 2    multivitamin (ONE A DAY) tablet Take 1 Tab by mouth daily.  vit a,c & e-lutein-minerals (OCUVITE) tablet daily.  b complex vitamins tablet Take 1 Tab by mouth daily.  omega 3-dha-epa-fish oil (FISH OIL) 100-160-1,000 mg cap Take  by mouth.  omeprazole (PRILOSEC OTC) 20 mg tablet Take 20 mg by mouth daily. 90 Tab 1    rosuvastatin (CRESTOR) 10 mg tablet Take 1 Tab by mouth nightly for 30 days.  30 Tab 5       REVIEW OF SYSTEMS:     Y  N       Y  N  Y  N   Y  N  [] [x] AIDS          [] [x] Falls  [x] [] Memory Loss  [] [x]  Shortness of breath  [x] [] Anxiety          [x] [] Fatigue [] [x] Muscle Pain        [] [x]  Skipped beats  [] [x] Chest Pain   [x] [] Frequent HA [] [x] Ms Weakness     [] [x]  Snoring  [] [x] Constipation [] [x]Hearing loss [] [x] Nause/Vomiting  [] [x]  Stomach Pain  [] [x] Cough          [] [x]Hepatitis [] [x] Neuropathy         [] [x]  Swallowing difficulty  [] [x] Depression  [] [x]Incontinence [] [x] Poor appetite      [] [x]  Vertigo  [] [x] Diarrhea       [] [x] Joint Pain [] [x] Rash                   [] [x]  Visual disturbances  [] [x] Fainting        [] [x] Leg Swelling [] [x] Ringing ears       [] [x]  Weight changes      []Unable to obtain  ROS due to  []mental status change  []sedated   []intubated          PREVIOUS WORKUP  IMAGING: MRI of the brain: Normal  (I personally reviewed these images in PACS and this is my impression)    LABS-TSH and B12 within normal limits  Results for orders placed or performed in visit on 83/95/66   METABOLIC PANEL, COMPREHENSIVE   Result Value Ref Range    Glucose 96 65 - 99 mg/dL    BUN 15 6 - 24 mg/dL    Creatinine 0.90 0.57 - 1.00 mg/dL    GFR est non-AA 72 >59 mL/min/1.73    GFR est AA 83 >59 mL/min/1.73 BUN/Creatinine ratio 17 9 - 23    Sodium 141 134 - 144 mmol/L    Potassium 3.8 3.5 - 5.2 mmol/L    Chloride 98 96 - 106 mmol/L    CO2 27 18 - 29 mmol/L    Calcium 9.9 8.7 - 10.2 mg/dL    Protein, total 7.6 6.0 - 8.5 g/dL    Albumin 4.7 3.5 - 5.5 g/dL    GLOBULIN, TOTAL 2.9 1.5 - 4.5 g/dL    A-G Ratio 1.6 1.2 - 2.2    Bilirubin, total 0.8 0.0 - 1.2 mg/dL    Alk. phosphatase 72 39 - 117 IU/L    AST (SGOT) 36 0 - 40 IU/L    ALT (SGPT) 66 (H) 0 - 32 IU/L   LIPID PANEL   Result Value Ref Range    Cholesterol, total 170 100 - 199 mg/dL    Triglyceride 210 (H) 0 - 149 mg/dL    HDL Cholesterol 43 >39 mg/dL    VLDL, calculated 42 (H) 5 - 40 mg/dL    LDL, calculated 85 0 - 99 mg/dL   CVD REPORT   Result Value Ref Range    INTERPRETATION Note    Memory loss: This patient generated a mixed normal/abnormal range Neuropsychological Evaluation with respect to neurocognitive functioning. In this regard, impairments are noted for auditory learning, auditory memory, visual organization, visual memory, bilateral fine motor dexterity, and she has a very mild problem with attention. Otherwise, her mental status, verbal fluency, confrontation naming, verbal comprehension, perceptual reasoning, working memory, processing speed, executive functioning, and bilateral motor speed remain from normal.  From an emotional standpoint, she reports mild depression and anxiety. She has dealt with quite a bit of loss and trauma in her life.                             There is a definite organic quality to this test performance, and the memory problems are not sufficiently explained by attention and/or mood related concerns. She is young for dementia but has a strong family history of same. I would call this Mild Cognitive Impairment with Memory Loss and suggest consideration for medication for memory as well as psychiatric treatment and counseling for mood related concerns. The memory loss issue is mild, but it is present and concerning.   I would like to re-evaluate her in six months to do a test by test comparison and track for any progression. If it worsens over time, then this is a very early dementia type process. This would be AD, not vascular, in my opinion. The profile is not consistent with pseudodementia. I see that she put forth good effort on this tests. She is competent, but needs supervision for medications and finances. This memory issue will definitely impact her ability to safely and effectively work without extra time to complete tasks, repetition, repeated instructions, written reminders, and reduced distraction. Follow up in six months for diagnostic clarification. Baseline now established. I would refer her to Neurology as well. Clinical correlation is strongly advised. PHYSICAL EXAM  Visit Vitals    /88 (BP 1 Location: Left arm, BP Patient Position: Sitting)    Pulse (!) 115    Resp 16    Ht 5' 2\" (1.575 m)    LMP 11/07/2010    SpO2 96%     General:  Alert, cooperative, no distress. Head:  Normocephalic, without obvious abnormality, atraumatic. Eyes:  Conjunctivae/corneas clear. Pupils equal, round, reactive to light. Extraocular movements intact, VFF, NO papilledema   Lungs:  Heart:   Non labored breathing  Regular rate and rhythm, no carotid bruits   Abdomen:   Soft, non-distended   Extremities: Extremities normal, atraumatic, no cyanosis or edema. Pulses: 2+ and symmetric all extremities. Skin: Skin color, texture, turgor normal. No rashes or lesions.    Neurologic:  Gen: Attention normal             Language: naming, repetition, fluency normal             Memory: intact recent and remote memory  Cranial Nerves:  I: smell Not tested   II: visual fields Full to confrontation   II: pupils Equal, round, reactive to light   II: optic disc No papilledema   III,VII: ptosis none   III,IV,VI: extraocular muscles  Full ROM   V: mastication normal   V: facial light touch sensation  normal   VII: facial muscle function   symmetric   VIII: hearing symmetric   IX: soft palate elevation  normal   XI: trapezius strength  5/5   XI: sternocleidomastoid strength 5/5   XI: neck flexion strength  5/5   XII: tongue  midline     Motor: normal bulk and tone, no tremor              Strength: 5/5 all four extremities  Sensory: intact to LT, PP, vibration, and temperature  Coordination: FTN intact, Rhomberg negative  Gait: normal gait including tandem   Reflexes: 2+ throughout       [address] is a 62 y.o. female who presents for evaluation of memory loss. According to neuropsych she has been diagnosed with mild cognitive impairment. Reversible causes with an MRI of the brain and labs have been negative. Discussed mild cognitive impairment and ramifications. At this point I would start patient on Aricept and she is willing to do so. RECOMMENDATIONS  1. Start Aricept 10 mg nightly. Side effects discussed. Information given to patient  2. Decrease amitriptyline to 5 mg nightly to see this helps with fatigue but still helps with migraine  3. Continue Maxalt for abortive for migraine  4. Discussed mild cognitive impairment and dementia and detail  5. Encourage patient to remain physically, socially, and mentally active  6. Neuropsych as above. May need to repeat in 6 months    FU 3 months    Krysten Gill MD    CC: Rosanna Harper MD  Fax: [phone]    This note was created using voice recognition software. Despite editing, there may be syntax errors. This note will not be viewable in [address]

## 2017-10-20 NOTE — MR AVS SNAPSHOT
Visit Information Date & Time Provider Department Dept. Phone Encounter #  
 10/20/2017  1:00 PM Lionel Mendosa MD [facility] Neurology [facility] [phone] [id number] Your Appointments 1/23/2018  1:40 PM  
Follow Up with Lionel Mendosa MD  
[facility]) Appt Note: follow up  $CP  raven  10/20/17 [place] [address] [facility] [address] [place] [id number] [phone]  
  
   
 [place] [id number] [address] Upcoming Health Maintenance Date Due INFLUENZA AGE 9 TO ADULT 8/1/2017 BREAST CANCER SCRN MAMMOGRAM 3/24/2019 PAP AKA CERVICAL CYTOLOGY 3/24/2020 COLONOSCOPY 6/28/2020 DTaP/Tdap/Td series (2 - Td) 12/21/2020 Allergies as of 10/20/2017  Review Complete On: 10/20/2017 By: Lionel Mendosa MD  
  
 Severity Noted Reaction Type Reactions Erythromycin  11/04/2010    Nausea and Vomiting Lisinopril  07/30/2015    Other (comments) \"margoth achy\" Current Immunizations  Reviewed on 6/20/2014 Name Date Influenza Vaccine 9/20/2017, 10/24/2016, 10/1/2014 Influenza Vaccine Whole 10/1/2012 TDAP Vaccine 12/21/2010 Not reviewed this visit Vitals BP Pulse Resp Height(growth percentile) LMP SpO2  
 138/88 (BP 1 Location: Left arm, BP Patient Position: Sitting) (!) 115 16 5' 2\" (1.575 m) 11/07/2010 96% OB Status Smoking Status Postmenopausal Never Smoker Vitals History Preferred Pharmacy Pharmacy Name Phone [id number] [facility] [phone] Your Updated Medication List  
  
   
This list is accurate as of: 10/20/17  1:59 PM.  Always use your most recent med list.  
  
  
  
  
 amitriptyline 10 mg tablet Commonly known as:  ELAVIL TAKE 1 TAB BY MOUTH NIGHTLY FOR 90 DAYS. b complex vitamins tablet Take 1 Tab by mouth daily. buPROPion  mg tablet Commonly known as:  Johnita Plump Take 1 Tab by mouth every morning. donepezil 10 mg tablet Commonly known as:  ARICEPT Take 1 Tab by mouth nightly. FISH -160-1,000 mg Cap Generic drug:  omega 3-dha-epa-fish oil Take  by mouth. hydroCHLOROthiazide 25 mg tablet Commonly known as:  HYDRODIURIL  
TAKE 1 TAB BY MOUTH DAILY FOR 90 DAYS. KLOR-CON M10 10 mEq tablet Generic drug:  potassium chloride TAKE 1 TABLET BY MOUTH TWO (2) TIMES A DAY FOR 30 DAYS. multivitamin tablet Commonly known as:  ONE A DAY Take 1 Tab by mouth daily. omeprazole 20 mg tablet Commonly known as:  PriLOSEC OTC Take 20 mg by mouth daily. rizatriptan 10 mg disintegrating tablet Commonly known as:  MAXALT-MLT Take 1 Tab by mouth once as needed for Migraine for up to 1 dose. * rosuvastatin 10 mg tablet Commonly known as:  CRESTOR Take 1 Tab by mouth nightly for 30 days. * rosuvastatin 10 mg tablet Commonly known as:  CRESTOR Take 1 Tab by mouth nightly. vit a,c & e-lutein-minerals tablet Commonly known as:  OCUVITE  
daily. * Notice: This list has 2 medication(s) that are the same as other medications prescribed for you. Read the directions carefully, and ask your doctor or other care provider to review them with you. Prescriptions Sent to Pharmacy Refills  
 donepezil (ARICEPT) 10 mg tablet 5 Sig: Take 1 Tab by mouth nightly. Class: Normal  
 Pharmacy: [facility] [address] Ph #: [phone] Route: Oral  
  
Patient Instructions Anat Morris [address] What is a living will? A living will is a legal form you use to write down the kind of care you want at the end of your life. It is used by the health professionals who will treat you if you aren't able to decide for yourself. If you put your wishes in writing, your loved ones and others will know what kind of care you want. They won't need to guess. This can ease your mind and be helpful to others. A living will is not the same as an estate or property will. An estate will explains what you want to happen with your money and property after you die. Is a living will a legal document? A living will is a legal document. Each state has its own laws about living benites. If you move to another state, make sure that your living will is legal in the state where you now live. Or you might use a universal form that has been approved by many states. This kind of form can sometimes be completed and stored online. Your electronic copy will then be available wherever you have a connection to the Internet. In most cases, doctors will respect your wishes even if you have a form from a different state. · You don't need an  to complete a living will. But legal advice can be helpful if your state's laws are unclear, your health history is complicated, or your family can't agree on what should be in your living will. · You can change your living will at any time. Some people find that their wishes about end-of-life care change as their health changes. · In addition to making a living will, think about completing a medical power of  form. This form lets you name the person you want to make end-of-life treatment decisions for you (your \"health care agent\") if you're not able to. Many hospitals and nursing homes will give you the forms you need to complete a living will and a medical power of . · Your living will is used only if you can't make or communicate decisions for yourself anymore. If you become able to make decisions again, you can accept or refuse any treatment, no matter what you wrote in your living will. · Your state may offer an online registry.  This is a place where you can store your living will online so the doctors and nurses who need to treat you can find it right away. What should you think about when creating a living will? Talk about your end-of-life wishes with your family members and your doctor. Let them know what you want. That way the people making decisions for you won't be surprised by your choices. Think about these questions as you make your living will: · Do you know enough about life support methods that might be used? If not, talk to your doctor so you know what might be done if you can't breathe on your own, your heart stops, or you're unable to swallow. · What things would you still want to be able to do after you receive life-support methods? Would you want to be able to walk? To speak? To eat on your own? To live without the help of machines? · If you have a choice, where do you want to be cared for? In your home? At a hospital or nursing home? · Do you want certain Anglican practices performed if you become very ill? · If you have a choice at the end of your life, where would you prefer to die? At home? In a hospital or nursing home? Somewhere else? · Would you prefer to be buried or cremated? · Do you want your organs to be donated after you die? What should you do with your living will? · Make sure that your family members and your health care agent have copies of your living will. · Give your doctor a copy of your living will to keep in your medical record. If you have more than one doctor, make sure that each one has a copy. · You may want to put a copy of your living will where it can be easily found. Where can you learn more? Go to http://keshia-sabrina.info/. Enter O324 in the search box to learn more about \"Learning About Living Perroy. \" Current as of: August 8, 2016 Content Version: 11.3 © 1035-8142 [employer].  Care instructions adapted under license by [address] (which disclaims liability or warranty for this information). If you have questions about a medical condition or this instruction, always ask your healthcare professional. [address] any warranty or liability for your use of this information. Patient Instructions/Plans: 
· Donepezil (By mouth) Donepezil ([id number]) Treats Alzheimer disease. Brand Name(s): Aricept There may be other brand names for this medicine. When This Medicine Should Not Be Used: This medicine is not right for everyone. Do not use it if you had an allergic reaction to donepezil or to medicines that contain piperidine. How to Use This Medicine:  
Tablet, Dissolving Tablet · Your doctor will tell you how much medicine to use. Do not use more than directed. · Read and follow the patient instructions that come with this medicine. Talk to your doctor or pharmacist if you have any questions. · Tablet: Swallow the 23-mg tablet whole. Do not crush, break, or chew it. · Disintegrating tablet:Make sure your hands are dry before you handle the disintegrating tablet. Peel back the foil from the blister pack, then remove the tablet. Do not push the tablet through the foil. Place the tablet in your mouth. After it has melted, swallow or take a drink of water. · Missed dose: Skip the missed dose and take your next dose at the regular time. Do not take extra medicine to make up for a missed dose. · Store the medicine in a closed container at room temperature, away from heat, moisture, and direct light. Drugs and Foods to Avoid: Ask your doctor or pharmacist before using any other medicine, including over-the-counter medicines, vitamins, and herbal products. · Some medicines can affect how donepezil works. Tell your doctor if you are using any of the following: ¨ Bethanechol, carbamazepine, dexamethasone, ketoconazole, phenobarbital, phenytoin, quinidine, or rifampin ¨ NSAID pain or arthritis medicine (such as aspirin, diclofenac, ibuprofen, naproxen) Warnings While Using This Medicine: · Tell your doctor if you are pregnant or breastfeeding, or if you have heart disease, lung disease, asthma or other breathing problems, stomach ulcers or bleeding, or trouble urinating. · This medicine may cause the following problems: ¨ Slow heartbeat ¨ Stomach or bowel bleeding ¨ Seizures · Tell any doctor or dentist who treats you that you are using this medicine. You may need to stop using this medicine several days before you have surgery or medical tests. · Your doctor will check your progress and the effects of this medicine at regular visits. Keep all appointments. · Keep all medicine out of the reach of children. Never share your medicine with anyone. Possible Side Effects While Using This Medicine:  
Call your doctor right away if you notice any of these side effects: · Allergic reaction: Itching or hives, swelling in your face or hands, swelling or tingling in your mouth or throat, chest tightness, trouble breathing · Bloody or black, tarry stools · Change in how much or how often you urinate · Chest pain, slow or uneven heartbeat, trouble breathing · Lightheadedness, dizziness, fainting · Seizures · Severe stomach pain · Unusual bleeding, bruising, or weakness · Vomiting of blood or material that looks like coffee grounds If you notice these less serious side effects, talk with your doctor: · Mild nausea, vomiting, or diarrhea · Weight loss If you notice other side effects that you think are caused by this medicine, tell your doctor. Call your doctor for medical advice about side effects. You may report side effects to FDA at 5-481-FDA-6013 © 2017 [address] Information is for End User's use only and may not be sold, redistributed or otherwise used for commercial purposes. The above information is an  only. It is not intended as medical advice for individual conditions or treatments. Talk to your doctor, nurse or pharmacist before following any medical regimen to see if it is safe and effective for you. Introducing [facility] & HE[facility]! Dear Naty Arevalo: 
Thank you for requesting a [employer] account. Our records indicate that you already have an active [employer] account. You can access your account anytime at [email or web address] Did you know that you can access your hospital and ER discharge instructions at any time in [employer]? You can also review all of your test results from your hospital stay or ER visit. Additional Information If you have questions, please visit the Frequently Asked Questions section of the [employer] website at [email or web address] Remember, [employer] is NOT to be used for urgent needs. For medical emergencies, dial 911. Now available from your iPhone and Android! Please provide this summary of care documentation to your next provider. Your primary care clinician is listed as Uma Mcdaniel. If you have any questions after today's visit, please call [phone].

## 2017-10-20 NOTE — PATIENT INSTRUCTIONS
Learning About [place] [place]  What is a living will? A living will is a legal form you use to write down the kind of care you want at the end of your life. It is used by the health professionals who will treat you if you aren't able to decide for yourself. If you put your wishes in writing, your loved ones and others will know what kind of care you want. They won't need to guess. This can ease your mind and be helpful to others. A living will is not the same as an estate or property will. An estate will explains what you want to happen with your money and property after you die. Is a living will a legal document? A living will is a legal document. Each state has its own laws about living benites. If you move to another state, make sure that your living will is legal in the state where you now live. Or you might use a universal form that has been approved by many states. This kind of form can sometimes be completed and stored online. Your electronic copy will then be available wherever you have a connection to the Internet. In most cases, doctors will respect your wishes even if you have a form from a different state. · You don't need an  to complete a living will. But legal advice can be helpful if your state's laws are unclear, your health history is complicated, or your family can't agree on what should be in your living will. · You can change your living will at any time. Some people find that their wishes about end-of-life care change as their health changes. · In addition to making a living will, think about completing a medical power of  form. This form lets you name the person you want to make end-of-life treatment decisions for you (your \"health care agent\") if you're not able to. Many hospitals and nursing homes will give you the forms you need to complete a living will and a medical power of .   · Your living will is used only if you can't make or communicate decisions for yourself anymore. If you become able to make decisions again, you can accept or refuse any treatment, no matter what you wrote in your living will. · Your state may offer an online registry. This is a place where you can store your living will online so the doctors and nurses who need to treat you can find it right away. What should you think about when creating a living will? Talk about your end-of-life wishes with your family members and your doctor. Let them know what you want. That way the people making decisions for you won't be surprised by your choices. Think about these questions as you make your living will:  · Do you know enough about life support methods that might be used? If not, talk to your doctor so you know what might be done if you can't breathe on your own, your heart stops, or you're unable to swallow. · What things would you still want to be able to do after you receive life-support methods? Would you want to be able to walk? To speak? To eat on your own? To live without the help of machines? · If you have a choice, where do you want to be cared for? In your home? At a hospital or nursing home? · Do you want certain [employer] practices performed if you become very ill? · If you have a choice at the end of your life, where would you prefer to die? At home? In a hospital or nursing home? Somewhere else? · Would you prefer to be buried or cremated? · Do you want your organs to be donated after you die? What should you do with your living will? · Make sure that your family members and your health care agent have copies of your living will. · Give your doctor a copy of your living will to keep in your medical record. If you have more than one doctor, make sure that each one has a copy. · You may want to put a copy of your living will where it can be easily found. Where can you learn more? Go to http://keshia-sabrina.info/.   Enter L409 in the search box to learn more about \"Learning About Living [place] [place]. \"  Current as of: August 8, 2016  Content Version: 11.3  © 5684-7527 [employer]. Care instructions adapted under license by [employer] (which disclaims liability or warranty for this information). If you have questions about a medical condition or this instruction, always ask your healthcare professional. [address] any warranty or liability for your use of this information. Patient Instructions/Plans:  ·   Donepezil (By mouth)   Donepezil (gwi-GKG-[id number])  Treats Alzheimer disease. Brand Name(s): Aricept   There may be other brand names for this medicine. When This Medicine Should Not Be Used: This medicine is not right for everyone. Do not use it if you had an allergic reaction to donepezil or to medicines that contain piperidine. How to Use This Medicine:   Tablet, Dissolving Tablet  · Your doctor will tell you how much medicine to use. Do not use more than directed. · Read and follow the patient instructions that come with this medicine. Talk to your doctor or pharmacist if you have any questions. · Tablet: Swallow the 23-mg tablet whole. Do not crush, break, or chew it. · Disintegrating tablet:Make sure your hands are dry before you handle the disintegrating tablet. Peel back the foil from the blister pack, then remove the tablet. Do not push the tablet through the foil. Place the tablet in your mouth. After it has melted, swallow or take a drink of water. · Missed dose: Skip the missed dose and take your next dose at the regular time. Do not take extra medicine to make up for a missed dose. · Store the medicine in a closed container at room temperature, away from heat, moisture, and direct light. Drugs and Foods to Avoid:   Ask your doctor or pharmacist before using any other medicine, including over-the-counter medicines, vitamins, and herbal products. · Some medicines can affect how donepezil works.  Tell your doctor if you are using any of the following:   ¨ Bethanechol, carbamazepine, dexamethasone, ketoconazole, phenobarbital, phenytoin, quinidine, or rifampin  ¨ NSAID pain or arthritis medicine (such as aspirin, diclofenac, ibuprofen, naproxen)  Warnings While Using This Medicine:   · Tell your doctor if you are pregnant or breastfeeding, or if you have heart disease, lung disease, asthma or other breathing problems, stomach ulcers or bleeding, or trouble urinating. · This medicine may cause the following problems:   ¨ Slow heartbeat  ¨ Stomach or bowel bleeding  ¨ Seizures  · Tell any doctor or dentist who treats you that you are using this medicine. You may need to stop using this medicine several days before you have surgery or medical tests. · Your doctor will check your progress and the effects of this medicine at regular visits. Keep all appointments. · Keep all medicine out of the reach of children. Never share your medicine with anyone. Possible Side Effects While Using This Medicine:   Call your doctor right away if you notice any of these side effects:  · Allergic reaction: Itching or hives, swelling in your face or hands, swelling or tingling in your mouth or throat, chest tightness, trouble breathing  · Bloody or black, tarry stools  · Change in how much or how often you urinate  · Chest pain, slow or uneven heartbeat, trouble breathing  · Lightheadedness, dizziness, fainting  · Seizures  · Severe stomach pain  · Unusual bleeding, bruising, or weakness  · Vomiting of blood or material that looks like coffee grounds  If you notice these less serious side effects, talk with your doctor:   · Mild nausea, vomiting, or diarrhea  · Weight loss  If you notice other side effects that you think are caused by this medicine, tell your doctor. Call your doctor for medical advice about side effects.  You may report side effects to FDA at 7-995-FDA-8272  © 2017 [address] Information is for End User's use only and may not be sold, redistributed or otherwise used for commercial purposes. The above information is an  only. It is not intended as medical advice for individual conditions or treatments. Talk to your doctor, nurse or pharmacist before following any medical regimen to see if it is safe and effective for you.

## 2017-10-20 NOTE — PROGRESS NOTES
HISTORY OF PRESENT ILLNESS  Emmer Sandhoff is a 62 y.o. female. HPI  Patient states she may have anxiety per Dr. Calderon Tejada. She states she had treated with Xanax in the past.  She states the amitriptyline 10 mg has been helping with headaches, but she has been feeling disengaged. Hypertension ROS: taking medications as instructed, no medication side effects noted, no TIA's, no chest pain on exertion, no dyspnea on exertion, no swelling of ankles. New concerns:  Patient's BP in office today is 122/77. She continues on HCTZ. Hyperlipidemia:  Cardiovascular risk analysis - 62 y.o. female LDL goal is under 130. ROS: taking medications as instructed, no medication side effects noted, no TIA's, no chest pain on exertion, no dyspnea on exertion, no swelling of ankles. Tolerating meds, no myalgias or other side effects noted  New concerns: Last LDL was 85 and total cholesterol was 170. She continues on Crestor. Patient received flu vaccine last month. Review of Systems   All other systems reviewed and are negative. Physical Exam   Constitutional: She is oriented to person, place, and time. She appears well-developed and well-nourished. HENT:   Head: Normocephalic and atraumatic. Right Ear: External ear normal.   Left Ear: External ear normal.   Nose: Nose normal.   Mouth/Throat: Oropharynx is clear and moist.   Eyes: Conjunctivae and EOM are normal.   Neck: Normal range of motion. Neck supple. Carotid bruit is not present. No thyroid mass and no thyromegaly present. Cardiovascular: Normal rate, regular rhythm, S1 normal, S2 normal, normal heart sounds and intact distal pulses. Pulmonary/Chest: Effort normal and breath sounds normal.   Abdominal: Soft. Normal appearance and bowel sounds are normal. There is no hepatosplenomegaly. There is no tenderness. Musculoskeletal: Normal range of motion. Neurological: She is alert and oriented to person, place, and time. She has normal strength.  No cranial nerve deficit or sensory deficit. Coordination normal.   Skin: Skin is warm, dry and intact. No abrasion and no rash noted. Psychiatric: She has a normal mood and affect. Her behavior is normal. Judgment and thought content normal.   Nursing note and vitals reviewed. ASSESSMENT and PLAN  Diagnoses and all orders for this visit:    1. Anxiety  Patient has bene more anxious and will need to start on Wellbutrin 150 mg. Discussed medication will take 7-10 days to initiate noticeable change in her mood. It can help with focus as as hopefully concentration    2. Essential hypertension  BP is at goal. I do not recommend any change in medications. 3. Intractable migraine without status migrainosus, unspecified migraine type  Discussed she will start on 1/2 amitriptyline 5 mg. See if that helps    4. Hyperlipidemia LDL goal <130  Stable, patient tolerating meds, no myalgias. I do not recommend any change in medications. Other orders  Discussed patient test results. We discussed Dr. Trisha Soto recommended medications but also encouraged patient to follow up with neurology for second opinion. She will do this in a couple of months  -     buPROPion XL (WELLBUTRIN XL) 150 mg tablet; Take 1 Tab by mouth every morning. lab results and schedule of future lab studies reviewed with patient  reviewed diet, exercise and weight control    Written by Sayra Gutiérrez, as dictated by Kedar Marr MD.     Current diagnosis and concerns discussed with pt at length. Understands risks and benefits or current treatment plan and medications and accepts the treatment and medication with any possible risks. Pt asks appropriate questions which were answered. Pt instructed to call with any concerns or problems. This note will not be viewable in [address]

## 2017-11-30 ENCOUNTER — TELEPHONE (OUTPATIENT)
Dept: NEUROLOGY | Age: 57
End: 2017-11-30

## 2017-11-30 RX ORDER — POTASSIUM CHLORIDE 750 MG/1
TABLET, EXTENDED RELEASE ORAL
Qty: 180 TAB | Refills: 0 | Status: SHIPPED | OUTPATIENT
Start: 2017-11-30 | End: 2018-01-02 | Stop reason: DRUGHIGH

## 2017-11-30 NOTE — TELEPHONE ENCOUNTER
Contacted pharmacy and informed them that the prescription has available refills on it. Pharmacist confirmed and states he will fill the refill attached to original prescription.

## 2017-11-30 NOTE — TELEPHONE ENCOUNTER
----- Message from Rodney Vera sent at 11/30/2017 12:17 PM EST -----  Regarding: Dr. Elissa Hilton is requesting a call back with the status of the refill request for Rx Donepezil 10mg that was sent to the office on 11/27/17. ([facility])[phone]

## 2017-12-11 RX ORDER — DONEPEZIL HYDROCHLORIDE 10 MG/1
10 TABLET, FILM COATED ORAL
Qty: 90 TAB | Refills: 0 | Status: SHIPPED | OUTPATIENT
Start: 2017-12-11 | End: 2018-05-01 | Stop reason: SDUPTHER

## 2018-01-02 ENCOUNTER — OFFICE VISIT (OUTPATIENT)
Dept: FAMILY MEDICINE CLINIC | Age: 58
End: 2018-01-02

## 2018-01-02 VITALS
SYSTOLIC BLOOD PRESSURE: 146 MMHG | WEIGHT: 193 LBS | OXYGEN SATURATION: 94 % | TEMPERATURE: 96.8 F | HEIGHT: 62 IN | RESPIRATION RATE: 18 BRPM | HEART RATE: 86 BPM | BODY MASS INDEX: 35.51 KG/M2 | DIASTOLIC BLOOD PRESSURE: 90 MMHG

## 2018-01-02 DIAGNOSIS — H65.03 BILATERAL ACUTE SEROUS OTITIS MEDIA, RECURRENCE NOT SPECIFIED: Primary | ICD-10-CM

## 2018-01-02 RX ORDER — PSEUDOEPHEDRINE HCL 30 MG
60 TABLET ORAL 3 TIMES DAILY
Qty: 24 TAB | Refills: 1 | Status: SHIPPED | OUTPATIENT
Start: 2018-01-02 | End: 2018-01-05

## 2018-01-02 RX ORDER — AMOXICILLIN AND CLAVULANATE POTASSIUM 875; 125 MG/1; MG/1
1 TABLET, FILM COATED ORAL EVERY 12 HOURS
Qty: 20 TAB | Refills: 0 | Status: SHIPPED | OUTPATIENT
Start: 2018-01-02 | End: 2018-01-12

## 2018-01-02 RX ORDER — POTASSIUM CHLORIDE 750 MG/1
TABLET, FILM COATED, EXTENDED RELEASE ORAL
Refills: 0 | COMMUNITY
Start: 2017-11-30 | End: 2018-11-06

## 2018-01-02 NOTE — PROGRESS NOTES
Subjective:   Isidoro Hyatt is a 62 y.o. female who complains of congestion, sore throat, nasal blockage, post nasal drip and dry cough for 7 days, gradually worsening since that time. She denies a history of shortness of breath and wheezing. Evaluation to date: none. Treatment to date: OTC products. Patient does not smoke cigarettes. Relevant PMH: No pertinent additional PMH. Patient Active Problem List   Diagnosis Code    Dysfunctional uterine bleeding N93.8    Migraine G43.909    Rhinitis, allergic J30.9    Insomnia G47.00    Essential hypertension I10     Patient Active Problem List    Diagnosis Date Noted    Essential hypertension 02/08/2016    Insomnia     Rhinitis, allergic 02/02/2015    Migraine 02/24/2012    Dysfunctional uterine bleeding 08/17/2011     Current Outpatient Prescriptions   Medication Sig Dispense Refill    KLOR-CON 10 10 mEq tablet TAKE 1 TABLET BY MOUTH TWICE A DAY  0    amoxicillin-clavulanate (AUGMENTIN) 875-125 mg per tablet Take 1 Tab by mouth every twelve (12) hours for 10 days. 20 Tab 0    pseudoephedrine (SUDAFED) 30 mg tablet Take 2 Tabs by mouth three (3) times daily for 3 days. 24 Tab 1    donepezil (ARICEPT) 10 mg tablet Take 1 Tab by mouth nightly. 90 Tab 0    hydroCHLOROthiazide (HYDRODIURIL) 25 mg tablet TAKE 1 TAB BY MOUTH DAILY FOR 90 DAYS. 90 Tab 0    amitriptyline (ELAVIL) 10 mg tablet TAKE 1 TAB BY MOUTH NIGHTLY FOR 90 DAYS. 90 Tab 0    rosuvastatin (CRESTOR) 10 mg tablet Take 1 Tab by mouth nightly. 90 Tab 1    rizatriptan (MAXALT-MLT) 10 mg disintegrating tablet Take 1 Tab by mouth once as needed for Migraine for up to 1 dose. 9 Tab 2    multivitamin (ONE A DAY) tablet Take 1 Tab by mouth daily.  vit a,c & e-lutein-minerals (OCUVITE) tablet daily.  b complex vitamins tablet Take 1 Tab by mouth daily.  omega 3-dha-epa-fish oil (FISH OIL) 100-160-1,000 mg cap Take  by mouth.       omeprazole (PRILOSEC OTC) 20 mg tablet Take 20 mg by mouth daily. 90 Tab 1    rosuvastatin (CRESTOR) 10 mg tablet Take 1 Tab by mouth nightly for 30 days. 30 Tab 5     Allergies   Allergen Reactions    Erythromycin Nausea and Vomiting    Lisinopril Other (comments)     \"margoth achy\"     Past Medical History:   Diagnosis Date    Anxiety     Desmoid fibromatosis     chest    Dysfunctional uterine bleeding 8/17/2011    GERD (gastroesophageal reflux disease)     Hyperlipemia     Hypertension     Insomnia     severe, sleep study normal 2011    Migraine 2/24/2012     Past Surgical History:   Procedure Laterality Date    CHEST SURGERY PROCEDURE UNLISTED      desmoid tumor    ENDOSCOPY, COLON, DIAGNOSTIC  5/2010    HX BREAST BIOPSY Right     benign    HX GYN      d and c    HX OTHER SURGICAL      tumor removal in chest removed     Family History   Problem Relation Age of Onset    Thyroid Disease Mother      goiter    Hypertension Father     Stroke Father     Thyroid Disease Sister      nodules    Hypertension Sister     Cancer Maternal Grandmother      polycythemia    Cancer Paternal Grandmother      breast    Breast Cancer Paternal Grandmother     Cancer Paternal Grandfather      lung cancer    Other Sister      lyme disease    No Known Problems Sister      Social History   Substance Use Topics    Smoking status: Never Smoker    Smokeless tobacco: Never Used    Alcohol use Yes      Comment: rarely        Review of Systems  Pertinent items are noted in HPI.     Objective:     Visit Vitals    /90 (BP 1 Location: Right arm, BP Patient Position: Sitting)    Pulse 86    Temp 96.8 °F (36 °C) (Oral)    Resp 18    Ht 5' 2\" (1.575 m)    Wt 193 lb (87.5 kg)    LMP 11/07/2010    SpO2 94%    BMI 35.3 kg/m2     General:  alert, cooperative, no distress   Eyes: negative   Ears: abnormal TM AD - erythematous, bulging, abnormal TM AS - erythematous, bulging   Sinuses: Normal paranasal sinuses without tenderness   Mouth:  Lips, mucosa, and tongue normal. Teeth and gums normal   Neck: supple, symmetrical, trachea midline and no adenopathy. Heart: S1 and S2 normal, no murmurs noted. Lungs: clear to auscultation bilaterally   Abdomen: soft, non-tender. Bowel sounds normal. No masses,  no organomegaly        Assessment/Plan:   otitis media  Discussed the dx and tx of sinusitis. Suggested symptomatic OTC remedies. Antibiotics per orders. RTC prn. ICD-10-CM ICD-9-CM    1. Bilateral acute serous otitis media, recurrence not specified H65.03 381.01 amoxicillin-clavulanate (AUGMENTIN) 875-125 mg per tablet      pseudoephedrine (SUDAFED) 30 mg tablet   Discussed elevated blood pressure, and cautioned the use of cold products. She will follow up with PCP.

## 2018-01-02 NOTE — PROGRESS NOTES
Chief Complaint   Patient presents with    Cold Symptoms     diarrhea, cough, both ears feel full for about a weak, has taken Mucinex and Sudafed

## 2018-01-02 NOTE — MR AVS SNAPSHOT
Visit Information Date & Time Provider Department Dept. Phone Encounter #  
 1/2/2018  9:15 AM Malia Albarran, [address] [phone] [id number] Follow-up Instructions Return if symptoms worsen or fail to improve. Your Appointments 1/26/2018  1:40 PM  
Follow Up with Darrius Velasquez MD  
[facility]) Appt Note: memory loss [address] [facility] 99 [id number] [phone]  
  
   
 [place] [address] [id number] [address] [place] Upcoming Health Maintenance Date Due  
 PAP AKA CERVICAL CYTOLOGY 3/24/2020 COLONOSCOPY 6/28/2020 DTaP/Tdap/Td series (2 - Td) 12/21/2020 Allergies as of 1/2/2018  Review Complete On: 1/2/2018 By: Reina Queen LPN Severity Noted Reaction Type Reactions Erythromycin  11/04/2010    Nausea and Vomiting Lisinopril  07/30/2015    Other (comments) \"margoth achy\" Current Immunizations  Reviewed on 6/20/2014 Name Date Influenza Vaccine 9/20/2017, 10/24/2016, 10/1/2014 Influenza Vaccine Whole 10/1/2012 TDAP Vaccine 12/21/2010 Not reviewed this visit You Were Diagnosed With   
  
 Codes Comments Bilateral acute serous otitis media, recurrence not specified    -  Primary ICD-10-CM: H65.03 
ICD-9-CM: 381.01 Vitals BP Pulse Temp Resp Height(growth percentile) Weight(growth percentile) 146/90 (BP 1 Location: Right arm, BP Patient Position: Sitting) 86 96.8 °F (36 °C) (Oral) 18 5' 2\" (1.575 m) 193 lb (87.5 kg) LMP SpO2 BMI OB Status Smoking Status 11/07/2010 94% 35.3 kg/m2 Postmenopausal Never Smoker BMI and BSA Data Body Mass Index Body Surface Area  
 35.3 kg/m 2 1.96 m 2 Preferred Pharmacy Pharmacy Name Phone [facility] [phone] Your Updated Medication List  
  
   
 This list is accurate as of: 1/2/18  9:44 AM.  Always use your most recent med list.  
  
  
  
  
 amitriptyline 10 mg tablet Commonly known as:  ELAVIL TAKE 1 TAB BY MOUTH NIGHTLY FOR 90 DAYS. amoxicillin-clavulanate 875-125 mg per tablet Commonly known as:  AUGMENTIN Take 1 Tab by mouth every twelve (12) hours for 10 days. b complex vitamins tablet Take 1 Tab by mouth daily. donepezil 10 mg tablet Commonly known as:  ARICEPT Take 1 Tab by mouth nightly. FISH -160-1,000 mg Cap Generic drug:  omega 3-dha-epa-fish oil Take  by mouth. hydroCHLOROthiazide 25 mg tablet Commonly known as:  HYDRODIURIL  
TAKE 1 TAB BY MOUTH DAILY FOR 90 DAYS. KLOR-CON 10 10 mEq tablet Generic drug:  potassium chloride SR  
TAKE 1 TABLET BY MOUTH TWICE A DAY  
  
 multivitamin tablet Commonly known as:  ONE A DAY Take 1 Tab by mouth daily. omeprazole 20 mg tablet Commonly known as:  PriLOSEC OTC Take 20 mg by mouth daily. pseudoephedrine 30 mg tablet Commonly known as:  SUDAFED Take 2 Tabs by mouth three (3) times daily for 3 days. rizatriptan 10 mg disintegrating tablet Commonly known as:  MAXALT-MLT Take 1 Tab by mouth once as needed for Migraine for up to 1 dose. * rosuvastatin 10 mg tablet Commonly known as:  CRESTOR Take 1 Tab by mouth nightly for 30 days. * rosuvastatin 10 mg tablet Commonly known as:  CRESTOR Take 1 Tab by mouth nightly. vit a,c & e-lutein-minerals tablet Commonly known as:  OCUVITE  
daily. * Notice: This list has 2 medication(s) that are the same as other medications prescribed for you. Read the directions carefully, and ask your doctor or other care provider to review them with you. Prescriptions Sent to Pharmacy Refills  
 amoxicillin-clavulanate (AUGMENTIN) 875-125 mg per tablet 0 Sig: Take 1 Tab by mouth every twelve (12) hours for 10 days. Class: Normal  
 [facility] #: [phone] Route: Oral  
 pseudoephedrine (SUDAFED) 30 mg tablet 1 Sig: Take 2 Tabs by mouth three (3) times daily for 3 days. Class: Normal  
 [facility]  #: [phone] Route: Oral  
  
Follow-up Instructions Return if symptoms worsen or fail to improve. Patient Instructions Middle Ear Fluid: Care Instructions Your Care Instructions Fluid often builds up inside the ear during a cold or allergies. Usually the fluid drains away, but sometimes a small tube in the ear, called the eustachian tube, stays blocked for months. Symptoms of fluid buildup may include: · Popping, ringing, or a feeling of fullness or pressure in the ear. · Trouble hearing. · Balance problems and dizziness. In most cases, you can treat yourself at home. Follow-up care is a key part of your treatment and safety. Be sure to make and go to all appointments, and call your doctor if you are having problems. It's also a good idea to know your test results and keep a list of the medicines you take. How can you care for yourself at home? · In most cases, the fluid clears up within a few months without treatment. You may need more tests if the fluid does not clear up after 3 months. · If your doctor prescribed antibiotics, take them as directed. Do not stop taking them just because you feel better. You need to take the full course of antibiotics. When should you call for help? Call your doctor now or seek immediate medical care if: 
? · You have symptoms of infection, such as: 
¨ Increased pain, swelling, warmth, or redness. ¨ Pus draining from the area. ¨ A fever. ? Watch closely for changes in your health, and be sure to contact your doctor if: 
? · You notice changes in hearing. ? · You do not get better as expected. Where can you learn more? Go to http://keshia-sabrina.info/. Enter I460 in the search box to learn more about \"Middle Ear Fluid: Care Instructions. \" Current as of: May 12, 2017 Content Version: 11.4 © 2210-7173 Healthwise, ki work. Care instructions adapted under license by [employer] (which disclaims liability or warranty for this information). If you have questions about a medical condition or this instruction, always ask your healthcare professional. [address] any warranty or liability for your use of this information. Introducing [facility]! Dear Marivel Reina: 
Thank you for requesting a [employer] account. Our records indicate that you already have an active [employer] account. You can access your account anytime at [email or web address] Did you know that you can access your hospital and ER discharge instructions at any time in [employer]? You can also review all of your test results from your hospital stay or ER visit. Additional Information If you have questions, please visit the Frequently Asked Questions section of the [employer] website at [email or web address] Remember, [employer] is NOT to be used for urgent needs. For medical emergencies, dial 911. Now available from your iPhone and Android! Please provide this summary of care documentation to your next provider. Your primary care clinician is listed as Aquilino Cisse. If you have any questions after today's visit, please call [phone].

## 2018-01-02 NOTE — PATIENT INSTRUCTIONS
Middle Ear Fluid: Care Instructions  Your Care Instructions    Fluid often builds up inside the ear during a cold or allergies. Usually the fluid drains away, but sometimes a small tube in the ear, called the eustachian tube, stays blocked for months. Symptoms of fluid buildup may include:  · Popping, ringing, or a feeling of fullness or pressure in the ear. · Trouble hearing. · Balance problems and dizziness. In most cases, you can treat yourself at home. Follow-up care is a key part of your treatment and safety. Be sure to make and go to all appointments, and call your doctor if you are having problems. It's also a good idea to know your test results and keep a list of the medicines you take. How can you care for yourself at home? · In most cases, the fluid clears up within a few months without treatment. You may need more tests if the fluid does not clear up after 3 months. · If your doctor prescribed antibiotics, take them as directed. Do not stop taking them just because you feel better. You need to take the full course of antibiotics. When should you call for help? Call your doctor now or seek immediate medical care if:  ? · You have symptoms of infection, such as:  ¨ Increased pain, swelling, warmth, or redness. ¨ Pus draining from the area. ¨ A fever. ? Watch closely for changes in your health, and be sure to contact your doctor if:  ? · You notice changes in hearing. ? · You do not get better as expected. Where can you learn more? Go to http://keshia-sabrina.info/. Enter O493 in the search box to learn more about \"Middle Ear Fluid: Care Instructions. \"  Current as of: May 12, 2017  Content Version: 11.4  © 2497-2924 [employer]. Care instructions adapted under license by [employer] (which disclaims liability or warranty for this information).  If you have questions about a medical condition or this instruction, always ask your healthcare professional. [address] any warranty or liability for your use of this information.

## 2018-01-13 RX ORDER — AMITRIPTYLINE HYDROCHLORIDE 10 MG/1
TABLET, FILM COATED ORAL
Qty: 90 TAB | Refills: 0 | Status: SHIPPED | OUTPATIENT
Start: 2018-01-13 | End: 2018-01-26 | Stop reason: SDUPTHER

## 2018-01-13 RX ORDER — HYDROCHLOROTHIAZIDE 25 MG/1
TABLET ORAL
Qty: 90 TAB | Refills: 0 | Status: SHIPPED | OUTPATIENT
Start: 2018-01-13 | End: 2018-04-08 | Stop reason: SDUPTHER

## 2018-01-26 ENCOUNTER — OFFICE VISIT (OUTPATIENT)
Dept: NEUROLOGY | Age: 58
End: 2018-01-26

## 2018-01-26 VITALS — HEART RATE: 125 BPM | SYSTOLIC BLOOD PRESSURE: 110 MMHG | OXYGEN SATURATION: 98 % | DIASTOLIC BLOOD PRESSURE: 80 MMHG

## 2018-01-26 DIAGNOSIS — G43.919 INTRACTABLE MIGRAINE WITHOUT STATUS MIGRAINOSUS, UNSPECIFIED MIGRAINE TYPE: ICD-10-CM

## 2018-01-26 DIAGNOSIS — G31.84 MILD COGNITIVE IMPAIRMENT WITH MEMORY LOSS: Primary | ICD-10-CM

## 2018-01-26 RX ORDER — AMITRIPTYLINE HYDROCHLORIDE 25 MG/1
TABLET, FILM COATED ORAL
Qty: 30 TAB | Refills: 5 | Status: SHIPPED | OUTPATIENT
Start: 2018-01-26 | End: 2018-05-01

## 2018-01-26 NOTE — PATIENT INSTRUCTIONS
Learning About Pancho Richardson  What is a living will? A living will is a legal form you use to write down the kind of care you want at the end of your life. It is used by the health professionals who will treat you if you aren't able to decide for yourself. If you put your wishes in writing, your loved ones and others will know what kind of care you want. They won't need to guess. This can ease your mind and be helpful to others. A living will is not the same as an estate or property will. An estate will explains what you want to happen with your money and property after you die. Is a living will a legal document? A living will is a legal document. Each state has its own laws about living benites. If you move to another state, make sure that your living will is legal in the state where you now live. Or you might use a universal form that has been approved by many states. This kind of form can sometimes be completed and stored online. Your electronic copy will then be available wherever you have a connection to the Internet. In most cases, doctors will respect your wishes even if you have a form from a different state. · You don't need an  to complete a living will. But legal advice can be helpful if your state's laws are unclear, your health history is complicated, or your family can't agree on what should be in your living will. · You can change your living will at any time. Some people find that their wishes about end-of-life care change as their health changes. · In addition to making a living will, think about completing a medical power of  form. This form lets you name the person you want to make end-of-life treatment decisions for you (your \"health care agent\") if you're not able to. Many hospitals and nursing homes will give you the forms you need to complete a living will and a medical power of .   · Your living will is used only if you can't make or communicate decisions for yourself anymore. If you become able to make decisions again, you can accept or refuse any treatment, no matter what you wrote in your living will. · Your state may offer an online registry. This is a place where you can store your living will online so the doctors and nurses who need to treat you can find it right away. What should you think about when creating a living will? Talk about your end-of-life wishes with your family members and your doctor. Let them know what you want. That way the people making decisions for you won't be surprised by your choices. Think about these questions as you make your living will:  · Do you know enough about life support methods that might be used? If not, talk to your doctor so you know what might be done if you can't breathe on your own, your heart stops, or you're unable to swallow. · What things would you still want to be able to do after you receive life-support methods? Would you want to be able to walk? To speak? To eat on your own? To live without the help of machines? · If you have a choice, where do you want to be cared for? In your home? At a hospital or nursing home? · Do you want certain Christian practices performed if you become very ill? · If you have a choice at the end of your life, where would you prefer to die? At home? In a hospital or nursing home? Somewhere else? · Would you prefer to be buried or cremated? · Do you want your organs to be donated after you die? What should you do with your living will? · Make sure that your family members and your health care agent have copies of your living will. · Give your doctor a copy of your living will to keep in your medical record. If you have more than one doctor, make sure that each one has a copy. · You may want to put a copy of your living will where it can be easily found. Where can you learn more? Go to http://keshia-sabrina.info/.   Enter S996 in the search box to learn more about \"Learning About Living Rocco. \"  Current as of: September 24, 2016  Content Version: 11.4  © 7381-1707 Healthwise, Incorporated. Care instructions adapted under license by [employer] (which disclaims liability or warranty for this information). If you have questions about a medical condition or this instruction, always ask your healthcare professional. [address] any warranty or liability for your use of this information. [address] [facility]   Statement to Patients  April 1, 2014      In an effort to ensure the large volume of patient prescription refills is processed in the most efficient and expeditious manner, we are asking our patients to assist us by calling your Pharmacy for all prescription refills, this will include also your  Mail Order Pharmacy. The pharmacy will contact our office electronically to continue the refill process. Please do not wait until the last minute to call your pharmacy. We need at least 48 hours (2days) to fill prescriptions. We also encourage you to call your pharmacy before going to  your prescription to make sure it is ready. With regard to controlled substance prescription refill requests (narcotic refills) that need to be picked up at our office, we ask your cooperation by providing us with at least 72 hours (3days) notice that you will need a refill. We will not refill narcotic prescription refill requests after 4:00pm on any weekday, Monday through Thursday, or after 2:00pm on Fridays, or on the weekends. We encourage everyone to explore another way of getting your prescription refill request processed using Swype, our patient web portal through our electronic medical record system. Swype is an efficient and effective way to communicate your medication request directly to the office and  downloadable as an tegan on your smart phone .  Swype also features a review functionality that allows you to view your medication list as well as leave messages for your physician. Are you ready to get connected? If so please review the attatched instructions or speak to any of our staff to get you set up right away! Thank you so much for your cooperation. Should you have any questions please contact our Practice Administrator. The Physicians and Staff,  [facility]     If we have ordered testing for you, we typically do not call patients with results. Your doctor or nurse will contact you if there are critical results that need to be addressed before your next appointment. We also schedule follow up appointments so that your results can be discussed in person and any questions you have regarding them may be addressed. Additionally, results may be found by using the My Chart feature and one of our patient service representatives at the  can give you instructions on how to access this feature of our electronic medical record system.

## 2018-01-26 NOTE — LETTER
Neurology Progress Note Patient ID: 
Savanna Lauren [id number] 
62 y.o. 
1960 HISTORY PROVIDED BY: 
Patient Chief Complaint: MCI Subjective:  
 Ms. Cole Metz is here for follow up today of MCI. Patient has been on Aricept. She has not had any side effects from this. She reports other than having difficulty with multitasking at work she seems to be fine. Her , was unable to come with her today, but reports he has not noticed any changes. Patient continues Maxalt for migraines. She is not having any migraines now she is on amitriptyline. She did not decrease the dose has been discussed. Patient's main issue is her sleep. She is not sleeping well. She will at most 6 hours a night. It will not be consecutive. She questions if she should be taking Klonopin like the rest of her family, we discussed this can cause dementia and she is not interested in that. Patient denies any hallucinations. Her PCP tried her on wellbutrin and this made her have anxiety attacks. She stopped it after 5 days. Objective:  
ROS: 
Per HPI- 
Otherwise 12 point ROS was negative Meds: 
Current Outpatient Prescriptions on File Prior to Visit Medication Sig Dispense Refill  hydroCHLOROthiazide (HYDRODIURIL) 25 mg tablet TAKE 1 TAB BY MOUTH DAILY FOR 90 DAYS. 90 Tab 0  
 amitriptyline (ELAVIL) 10 mg tablet TAKE 1 TAB BY MOUTH NIGHTLY FOR 90 DAYS. 90 Tab 0  
 KLOR-CON 10 10 mEq tablet TAKE 1 TABLET BY MOUTH TWICE A DAY  0  
 donepezil (ARICEPT) 10 mg tablet Take 1 Tab by mouth nightly. 90 Tab 0  
 rosuvastatin (CRESTOR) 10 mg tablet Take 1 Tab by mouth nightly. 90 Tab 1  rizatriptan (MAXALT-MLT) 10 mg disintegrating tablet Take 1 Tab by mouth once as needed for Migraine for up to 1 dose. 9 Tab 2  
 multivitamin (ONE A DAY) tablet Take 1 Tab by mouth daily.  vit a,c & e-lutein-minerals (OCUVITE) tablet daily.  omeprazole (PRILOSEC OTC) 20 mg tablet Take 20 mg by mouth daily.  90 Tab 1  
  rosuvastatin (CRESTOR) 10 mg tablet Take 1 Tab by mouth nightly for 30 days. (Patient not taking: Reported on 1/2/2018) 30 Tab 5  
 b complex vitamins tablet Take 1 Tab by mouth daily.  omega 3-dha-epa-fish oil (FISH OIL) 100-160-1,000 mg cap Take  by mouth. No current facility-administered medications on file prior to visit. Imaging: No new imaging [facility] and media tab today Lab Review Results for orders placed or performed in visit on 08/18/17 METABOLIC PANEL, COMPREHENSIVE Result Value Ref Range Glucose 96 65 - 99 mg/dL BUN 15 6 - 24 mg/dL Creatinine 0.90 0.57 - 1.00 mg/dL GFR est non-AA 72 >59 mL/min/1.73 GFR est AA 83 >59 mL/min/1.73  
 BUN/Creatinine ratio 17 9 - 23 Sodium 141 134 - 144 mmol/L Potassium 3.8 3.5 - 5.2 mmol/L Chloride 98 96 - 106 mmol/L  
 CO2 27 18 - 29 mmol/L Calcium 9.9 8.7 - 10.2 mg/dL Protein, total 7.6 6.0 - 8.5 g/dL Albumin 4.7 3.5 - 5.5 g/dL GLOBULIN, TOTAL 2.9 1.5 - 4.5 g/dL A-G Ratio 1.6 1.2 - 2.2 Bilirubin, total 0.8 0.0 - 1.2 mg/dL Alk. phosphatase 72 39 - 117 IU/L  
 AST (SGOT) 36 0 - 40 IU/L  
 ALT (SGPT) 66 (H) 0 - 32 IU/L  
LIPID PANEL Result Value Ref Range Cholesterol, total 170 100 - 199 mg/dL Triglyceride 210 (H) 0 - 149 mg/dL HDL Cholesterol 43 >39 mg/dL VLDL, calculated 42 (H) 5 - 40 mg/dL LDL, calculated 85 0 - 99 mg/dL CVD REPORT Result Value Ref Range INTERPRETATION Note Exam: 
Visit Vitals  /80  Pulse (!) 125  SpO2 98% Gen: Well developed CV: RRR Lungs: non labored breathing Abd: non distending Neuro: A&O x 3, no dysarthria or aphasia CN II-XII: PERRL, EOMI, face symmetric, tongue/palate midline Motor: strength 5/5 all four ext Sensory: intact to LT Gait: normal 
 
Assessment:  
Jennifer Nation is a 62 y.o. female who presents for follow up of mild cognitive impairment. She is doing well on Aricept. Will continue this. Reversible labs including MRI and B12/TSH were negative. Will attempt outpatient with getting better sleep to see if this helps with her focusing. Plan: 1. Continue Aricept 10 mg nightly. Side effects discussed. Information given to patient 2. Increase amitriptyline to 25 mg nightly. Hopefully this will help with insomnia. 3.  Continue Maxalt for abortive for migraine 4. Discuss if patient does need to be medication will try Lexapro in the future. 5.  Encourage patient to remain physically, socially, and mentally active 6. Neuropsych as above. May need to repeat in 6 months FU 3 months Signed: 
Yaneth Salazar MD 
1/26/2018 This note was created using voice recognition software. Despite editing, there may be syntax errors. This note will not be viewable in [address]

## 2018-01-26 NOTE — MR AVS SNAPSHOT
[address] [facility] 
 
 
 [place] [address] [id number] [phone] Patient: Betty Burt MRN: [id number] :1960 Visit Information Date & Time Provider Department Dept. Phone Encounter #  
 2018  1:40 PM Abel Bearden MD [facility] Neurology [facility] [phone] [id number] Your Appointments 2018  3:00 PM  
Follow Up with Able Bearden MD  
[facility] Appt Note: memory loss [place] [address] [id number] [phone]  
  
   
 [place] [address] [id number] [address] Upcoming Health Maintenance Date Due  
 BREAST CANCER SCRN MAMMOGRAM 3/24/2019 PAP AKA CERVICAL CYTOLOGY 3/24/2020 COLONOSCOPY 2020 DTaP/Tdap/Td series (2 - Td) 2020 Allergies as of 2018  Review Complete On: 2018 By: Esmer Pruitt Severity Noted Reaction Type Reactions Erythromycin  2010    Nausea and Vomiting Lisinopril  2015    Other (comments) \"margoth achy\" Current Immunizations  Reviewed on 2014 Name Date Influenza Vaccine 2017, 10/24/2016, 10/1/2014 Influenza Vaccine Whole 10/1/2012 TDAP Vaccine 2010 Not reviewed this visit Vitals BP Pulse LMP SpO2 OB Status Smoking Status 110/80 (!) 125 2010 98% Postmenopausal Never Smoker Vitals History Preferred Pharmacy Pharmacy Name Phone [id number] [place], [address]  [facility] [phone] Your Updated Medication List  
  
   
This list is accurate as of: 18  2:13 PM.  Always use your most recent med list.  
  
  
  
  
 amitriptyline 25 mg tablet Commonly known as:  ELAVIL TAKE 1 TAB BY MOUTH NIGHTLY FOR 90 DAYS. b complex vitamins tablet Take 1 Tab by mouth daily. donepezil 10 mg tablet Commonly known as:  ARICEPT Take 1 Tab by mouth nightly. FISH -160-1,000 mg Cap Generic drug:  omega 3-dha-epa-fish oil Take  by mouth. hydroCHLOROthiazide 25 mg tablet Commonly known as:  HYDRODIURIL  
TAKE 1 TAB BY MOUTH DAILY FOR 90 DAYS. KLOR-CON 10 10 mEq tablet Generic drug:  potassium chloride SR  
TAKE 1 TABLET BY MOUTH TWICE A DAY  
  
 multivitamin tablet Commonly known as:  ONE A DAY Take 1 Tab by mouth daily. omeprazole 20 mg tablet Commonly known as:  PriLOSEC OTC Take 20 mg by mouth daily. rizatriptan 10 mg disintegrating tablet Commonly known as:  MAXALT-MLT Take 1 Tab by mouth once as needed for Migraine for up to 1 dose. * rosuvastatin 10 mg tablet Commonly known as:  CRESTOR Take 1 Tab by mouth nightly for 30 days. * rosuvastatin 10 mg tablet Commonly known as:  CRESTOR Take 1 Tab by mouth nightly. vit a,c & e-lutein-minerals tablet Commonly known as:  OCUVITE  
daily. * Notice: This list has 2 medication(s) that are the same as other medications prescribed for you. Read the directions carefully, and ask your doctor or other care provider to review them with you. Prescriptions Sent to Pharmacy Refills  
 amitriptyline (ELAVIL) 25 mg tablet 5 Sig: TAKE 1 TAB BY MOUTH NIGHTLY FOR 90 DAYS. Class: Normal  
 Pharmacy: [address] Ph #: [phone] Patient Instructions Anat Morris [facility] What is a living will? A living will is a legal form you use to write down the kind of care you want at the end of your life. It is used by the health professionals who will treat you if you aren't able to decide for yourself. If you put your wishes in writing, your loved ones and others will know what kind of care you want. They won't need to guess.  This can ease your mind and be helpful to others. A living will is not the same as an estate or property will. An estate will explains what you want to happen with your money and property after you die. Is a living will a legal document? A living will is a legal document. Each state has its own laws about living benites. If you move to another state, make sure that your living will is legal in the state where you now live. Or you might use a universal form that has been approved by many states. This kind of form can sometimes be completed and stored online. Your electronic copy will then be available wherever you have a connection to the Internet. In most cases, doctors will respect your wishes even if you have a form from a different state. · You don't need an  to complete a living will. But legal advice can be helpful if your state's laws are unclear, your health history is complicated, or your family can't agree on what should be in your living will. · You can change your living will at any time. Some people find that their wishes about end-of-life care change as their health changes. · In addition to making a living will, think about completing a medical power of  form. This form lets you name the person you want to make end-of-life treatment decisions for you (your \"health care agent\") if you're not able to. Many hospitals and nursing homes will give you the forms you need to complete a living will and a medical power of . · Your living will is used only if you can't make or communicate decisions for yourself anymore. If you become able to make decisions again, you can accept or refuse any treatment, no matter what you wrote in your living will. · Your state may offer an online registry. This is a place where you can store your living will online so the doctors and nurses who need to treat you can find it right away. What should you think about when creating a living will? Talk about your end-of-life wishes with your family members and your doctor. Let them know what you want. That way the people making decisions for you won't be surprised by your choices. Think about these questions as you make your living will: · Do you know enough about life support methods that might be used? If not, talk to your doctor so you know what might be done if you can't breathe on your own, your heart stops, or you're unable to swallow. · What things would you still want to be able to do after you receive life-support methods? Would you want to be able to walk? To speak? To eat on your own? To live without the help of machines? · If you have a choice, where do you want to be cared for? In your home? At a hospital or nursing home? · Do you want certain Sikhism practices performed if you become very ill? · If you have a choice at the end of your life, where would you prefer to die? At home? In a hospital or nursing home? Somewhere else? · Would you prefer to be buried or cremated? · Do you want your organs to be donated after you die? What should you do with your living will? · Make sure that your family members and your health care agent have copies of your living will. · Give your doctor a copy of your living will to keep in your medical record. If you have more than one doctor, make sure that each one has a copy. · You may want to put a copy of your living will where it can be easily found. Where can you learn more? Go to http://keshia-sabrina.info/. Enter K121 in the search box to learn more about \"Learning About Living Perrobobbi. \" Current as of: September 24, 2016 Content Version: 11.4 © 2350-5106 [employer]. Care instructions adapted under license by [employer] (which disclaims liability or warranty for this information).  If you have questions about a medical condition or this instruction, always ask your healthcare professional. [employer] disclaims any warranty or liability for your use of this information. PRESCRIPTION REFILL POLICY [facility] Statement to Patients April 1, 2014 In an effort to ensure the large volume of patient prescription refills is processed in the most efficient and expeditious manner, we are asking our patients to assist us by calling your Pharmacy for all prescription refills, this will include also your  Mail Order Pharmacy. The pharmacy will contact our office electronically to continue the refill process. Please do not wait until the last minute to call your pharmacy. We need at least 48 hours (2days) to fill prescriptions. We also encourage you to call your pharmacy before going to  your prescription to make sure it is ready. With regard to controlled substance prescription refill requests (narcotic refills) that need to be picked up at our office, we ask your cooperation by providing us with at least 72 hours (3days) notice that you will need a refill. We will not refill narcotic prescription refill requests after 4:00pm on any weekday, Monday through Thursday, or after 2:00pm on Fridays, or on the weekends. We encourage everyone to explore another way of getting your prescription refill request processed using [employer], our patient web portal through our electronic medical record system. [employer] is an efficient and effective way to communicate your medication request directly to the office and  downloadable as an tegan on your smart phone . [employer] also features a review functionality that allows you to view your medication list as well as leave messages for your physician. Are you ready to get connected? If so please review the attatched instructions or speak to any of our staff to get you set up right away! Thank you so much for your cooperation. Should you have any questions please contact our Practice Administrator. The Physicians and Staff,  [facility] If we have ordered testing for you, we typically do not call patients with results. Your doctor or nurse will contact you if there are critical results that need to be addressed before your next appointment. We also schedule follow up appointments so that your results can be discussed in person and any questions you have regarding them may be addressed. Additionally, results may be found by using the My Chart feature and one of our patient service representatives at the  can give you instructions on how to access this feature of our electronic medical record system. Introducing [facility] SERVICES! Dear Hannah Doll: 
Thank you for requesting a [employer] account. Our records indicate that you already have an active In[employer] account. You can access your account anytime at [email or web address] Did you know that you can access your hospital and ER discharge instructions at any time in InVis[employer]? You can also review all of your test results from your hospital stay or ER visit. Additional Information If you have questions, please visit the Frequently Asked Questions section of the In[employer] website at [email or web address] Remember, InVis[employer] is NOT to be used for urgent needs. For medical emergencies, dial 911. Now available from your iPhone and Android! Please provide this summary of care documentation to your next provider. Your primary care clinician is listed as Rob Velasco. If you have any questions after today's visit, please call [phone].

## 2018-01-26 NOTE — PROGRESS NOTES
Neurology Progress Note    Patient ID:  Carmen Pro  [id number]  62 y.o.  1960    HISTORY PROVIDED BY:  Patient    Chief Complaint: MCI  Subjective:    Ms. Dorie Kang is here for follow up today of MCI. Patient has been on Aricept. She has not had any side effects from this. She reports other than having difficulty with multitasking at work she seems to be fine. Her , was unable to come with her today, but reports he has not noticed any changes. Patient continues Maxalt for migraines. She is not having any migraines now she is on amitriptyline. She did not decrease the dose has been discussed. Patient's main issue is her sleep. She is not sleeping well. She will at most 6 hours a night. It will not be consecutive. She questions if she should be taking Klonopin like the rest of her family, we discussed this can cause dementia and she is not interested in that. Patient denies any hallucinations. Her PCP tried her on wellbutrin and this made her have anxiety attacks. She stopped it after 5 days. Objective:   ROS:  Per HPI-  Otherwise 12 point ROS was negative    Meds:  Current Outpatient Prescriptions on File Prior to Visit   Medication Sig Dispense Refill    hydroCHLOROthiazide (HYDRODIURIL) 25 mg tablet TAKE 1 TAB BY MOUTH DAILY FOR 90 DAYS. 90 Tab 0    amitriptyline (ELAVIL) 10 mg tablet TAKE 1 TAB BY MOUTH NIGHTLY FOR 90 DAYS. 90 Tab 0    KLOR-CON 10 10 mEq tablet TAKE 1 TABLET BY MOUTH TWICE A DAY  0    donepezil (ARICEPT) 10 mg tablet Take 1 Tab by mouth nightly. 90 Tab 0    rosuvastatin (CRESTOR) 10 mg tablet Take 1 Tab by mouth nightly. 90 Tab 1    rizatriptan (MAXALT-MLT) 10 mg disintegrating tablet Take 1 Tab by mouth once as needed for Migraine for up to 1 dose. 9 Tab 2    multivitamin (ONE A DAY) tablet Take 1 Tab by mouth daily.  vit a,c & e-lutein-minerals (OCUVITE) tablet daily.  omeprazole (PRILOSEC OTC) 20 mg tablet Take 20 mg by mouth daily.  90 Tab 1    rosuvastatin (CRESTOR) 10 mg tablet Take 1 Tab by mouth nightly for 30 days. (Patient not taking: Reported on 1/2/2018) 30 Tab 5    b complex vitamins tablet Take 1 Tab by mouth daily.  omega 3-dha-epa-fish oil (FISH OIL) 100-160-1,000 mg cap Take  by mouth. No current facility-administered medications on file prior to visit. Imaging:  No new imaging    [facility]    Lab Review   Results for orders placed or performed in visit on 27/12/39   METABOLIC PANEL, COMPREHENSIVE   Result Value Ref Range    Glucose 96 65 - 99 mg/dL    BUN 15 6 - 24 mg/dL    Creatinine 0.90 0.57 - 1.00 mg/dL    GFR est non-AA 72 >59 mL/min/1.73    GFR est AA 83 >59 mL/min/1.73    BUN/Creatinine ratio 17 9 - 23    Sodium 141 134 - 144 mmol/L    Potassium 3.8 3.5 - 5.2 mmol/L    Chloride 98 96 - 106 mmol/L    CO2 27 18 - 29 mmol/L    Calcium 9.9 8.7 - 10.2 mg/dL    Protein, total 7.6 6.0 - 8.5 g/dL    Albumin 4.7 3.5 - 5.5 g/dL    GLOBULIN, TOTAL 2.9 1.5 - 4.5 g/dL    A-G Ratio 1.6 1.2 - 2.2    Bilirubin, total 0.8 0.0 - 1.2 mg/dL    Alk. phosphatase 72 39 - 117 IU/L    AST (SGOT) 36 0 - 40 IU/L    ALT (SGPT) 66 (H) 0 - 32 IU/L   LIPID PANEL   Result Value Ref Range    Cholesterol, total 170 100 - 199 mg/dL    Triglyceride 210 (H) 0 - 149 mg/dL    HDL Cholesterol 43 >39 mg/dL    VLDL, calculated 42 (H) 5 - 40 mg/dL    LDL, calculated 85 0 - 99 mg/dL   CVD REPORT   Result Value Ref Range    INTERPRETATION Note        Exam:  Visit Vitals    /80    Pulse (!) 125    SpO2 98%     Gen: Well developed  CV: RRR  Lungs: non labored breathing  Abd: non distending  Neuro: A&O x 3, no dysarthria or aphasia  CN II-XII: PERRL, EOMI, face symmetric, tongue/palate midline  Motor: strength 5/5 all four ext  Sensory: intact to LT  Gait: normal    Assessment:   Adeline Cadena is a 62 y.o. female who presents for follow up of mild cognitive impairment. She is doing well on Aricept.   Will continue this. Reversible labs including MRI and B12/TSH were negative. Will attempt outpatient with getting better sleep to see if this helps with her focusing. Plan:   1. Continue Aricept 10 mg nightly. Side effects discussed. Information given to patient  2. Increase amitriptyline to 25 mg nightly. Hopefully this will help with insomnia. 3.  Continue Maxalt for abortive for migraine  4. Discuss if patient does need to be medication will try Lexapro in the future. 5.  Encourage patient to remain physically, socially, and mentally active  6. Neuropsych as above. May need to repeat in 6 months    FU 3 months      Signed:  Abel Bearden MD  1/26/2018    This note was created using voice recognition software. Despite editing, there may be syntax errors. This note will not be viewable in [address]

## 2018-04-12 RX ORDER — HYDROCHLOROTHIAZIDE 25 MG/1
TABLET ORAL
Qty: 90 TAB | Refills: 0 | Status: SHIPPED | OUTPATIENT
Start: 2018-04-12 | End: 2018-05-01

## 2018-04-12 RX ORDER — AMITRIPTYLINE HYDROCHLORIDE 10 MG/1
TABLET, FILM COATED ORAL
Qty: 90 TAB | Refills: 0 | Status: SHIPPED | OUTPATIENT
Start: 2018-04-12 | End: 2018-05-02 | Stop reason: SDUPTHER

## 2018-04-30 ENCOUNTER — OFFICE VISIT (OUTPATIENT)
Dept: FAMILY MEDICINE CLINIC | Age: 58
End: 2018-04-30

## 2018-04-30 ENCOUNTER — HOSPITAL ENCOUNTER (OUTPATIENT)
Dept: GENERAL RADIOLOGY | Age: 58
Discharge: HOME | End: 2018-04-30
Payer: OTHER MISCELLANEOUS

## 2018-04-30 VITALS
DIASTOLIC BLOOD PRESSURE: 100 MMHG | RESPIRATION RATE: 16 BRPM | HEIGHT: 62 IN | TEMPERATURE: 96.7 F | WEIGHT: 193 LBS | SYSTOLIC BLOOD PRESSURE: 144 MMHG | OXYGEN SATURATION: 95 % | BODY MASS INDEX: 35.51 KG/M2 | HEART RATE: 117 BPM

## 2018-04-30 DIAGNOSIS — S20.211A CONTUSION OF RIB ON RIGHT SIDE, INITIAL ENCOUNTER: Primary | ICD-10-CM

## 2018-04-30 DIAGNOSIS — S20.211A CONTUSION OF RIB ON RIGHT SIDE, INITIAL ENCOUNTER: ICD-10-CM

## 2018-04-30 PROBLEM — E66.01 SEVERE OBESITY (BMI 35.0-39.9) WITH COMORBIDITY (HCC): Status: ACTIVE | Noted: 2018-04-30

## 2018-04-30 PROCEDURE — 71101 X-RAY EXAM UNILAT RIBS/CHEST: CPT

## 2018-04-30 NOTE — PROGRESS NOTES
Chief Complaint   Patient presents with   Cynthia Cast coming into main entrance at [facility]. Fell flat hurting bilateral hands, right side, and both knees.

## 2018-05-01 ENCOUNTER — OFFICE VISIT (OUTPATIENT)
Dept: NEUROLOGY | Age: 58
End: 2018-05-01

## 2018-05-01 VITALS
SYSTOLIC BLOOD PRESSURE: 130 MMHG | HEART RATE: 86 BPM | RESPIRATION RATE: 16 BRPM | OXYGEN SATURATION: 97 % | BODY MASS INDEX: 35.3 KG/M2 | HEIGHT: 62 IN | DIASTOLIC BLOOD PRESSURE: 82 MMHG

## 2018-05-01 DIAGNOSIS — G31.84 MILD COGNITIVE IMPAIRMENT WITH MEMORY LOSS: Primary | ICD-10-CM

## 2018-05-01 DIAGNOSIS — G43.009 MIGRAINE WITHOUT AURA AND WITHOUT STATUS MIGRAINOSUS, NOT INTRACTABLE: ICD-10-CM

## 2018-05-01 DIAGNOSIS — F33.0 DEPRESSION, MAJOR, RECURRENT, MILD (HCC): ICD-10-CM

## 2018-05-01 DIAGNOSIS — F41.1 GENERALIZED ANXIETY DISORDER: ICD-10-CM

## 2018-05-01 RX ORDER — ESCITALOPRAM OXALATE 10 MG/1
10 TABLET ORAL DAILY
Qty: 90 TAB | Refills: 1 | Status: SHIPPED | OUTPATIENT
Start: 2018-05-01 | End: 2018-10-25 | Stop reason: SDUPTHER

## 2018-05-01 RX ORDER — DONEPEZIL HYDROCHLORIDE 10 MG/1
10 TABLET, FILM COATED ORAL
Qty: 90 TAB | Refills: 1 | Status: SHIPPED | OUTPATIENT
Start: 2018-05-01 | End: 2018-11-21 | Stop reason: SDUPTHER

## 2018-05-01 NOTE — PATIENT INSTRUCTIONS
10 [facility]   Statement to Patients  April 1, 2014      In an effort to ensure the large volume of patient prescription refills is processed in the most efficient and expeditious manner, we are asking our patients to assist us by calling your Pharmacy for all prescription refills, this will include also your  Mail Order Pharmacy. The pharmacy will contact our office electronically to continue the refill process. Please do not wait until the last minute to call your pharmacy. We need at least 48 hours (2days) to fill prescriptions. We also encourage you to call your pharmacy before going to  your prescription to make sure it is ready. With regard to controlled substance prescription refill requests (narcotic refills) that need to be picked up at our office, we ask your cooperation by providing us with at least 72 hours (3days) notice that you will need a refill. We will not refill narcotic prescription refill requests after 4:00pm on any weekday, Monday through Thursday, or after 2:00pm on Fridays, or on the weekends. We encourage everyone to explore another way of getting your prescription refill request processed using MSI, our patient web portal through our electronic medical record system. MSI is an efficient and effective way to communicate your medication request directly to the office and  downloadable as an tegan on your smart phone . MSI also features a review functionality that allows you to view your medication list as well as leave messages for your physician. Are you ready to get connected? If so please review the attatched instructions or speak to any of our staff to get you set up right away! Thank you so much for your cooperation. Should you have any questions please contact our Practice Administrator.     The Physicians and Staff,  [facility] [address] [address]  What is a living will?    A living will is a legal form you use to write down the kind of care you want at the end of your life. It is used by the health professionals who will treat you if you aren't able to decide for yourself. If you put your wishes in writing, your loved ones and others will know what kind of care you want. They won't need to guess. This can ease your mind and be helpful to others. A living will is not the same as an estate or property will. An estate will explains what you want to happen with your money and property after you die. Is a living will a legal document? A living will is a legal document. Each state has its own laws about living benites. If you move to another state, make sure that your living will is legal in the state where you now live. Or you might use a universal form that has been approved by many states. This kind of form can sometimes be completed and stored online. Your electronic copy will then be available wherever you have a connection to the Internet. In most cases, doctors will respect your wishes even if you have a form from a different state. · You don't need an  to complete a living will. But legal advice can be helpful if your state's laws are unclear, your health history is complicated, or your family can't agree on what should be in your living will. · You can change your living will at any time. Some people find that their wishes about end-of-life care change as their health changes. · In addition to making a living will, think about completing a medical power of  form. This form lets you name the person you want to make end-of-life treatment decisions for you (your \"health care agent\") if you're not able to. Many hospitals and nursing homes will give you the forms you need to complete a living will and a medical power of . · Your living will is used only if you can't make or communicate decisions for yourself anymore.  If you become able to make decisions again, you can accept or refuse any treatment, no matter what you wrote in your living will. · Your state may offer an online registry. This is a place where you can store your living will online so the doctors and nurses who need to treat you can find it right away. What should you think about when creating a living will? Talk about your end-of-life wishes with your family members and your doctor. Let them know what you want. That way the people making decisions for you won't be surprised by your choices. Think about these questions as you make your living will:  · Do you know enough about life support methods that might be used? If not, talk to your doctor so you know what might be done if you can't breathe on your own, your heart stops, or you're unable to swallow. · What things would you still want to be able to do after you receive life-support methods? Would you want to be able to walk? To speak? To eat on your own? To live without the help of machines? · If you have a choice, where do you want to be cared for? In your home? At a hospital or nursing home? · Do you want certain Oriental orthodox practices performed if you become very ill? · If you have a choice at the end of your life, where would you prefer to die? At home? In a hospital or nursing home? Somewhere else? · Would you prefer to be buried or cremated? · Do you want your organs to be donated after you die? What should you do with your living will? · Make sure that your family members and your health care agent have copies of your living will. · Give your doctor a copy of your living will to keep in your medical record. If you have more than one doctor, make sure that each one has a copy. · You may want to put a copy of your living will where it can be easily found. Where can you learn more? Go to http://keshia-sabrina.info/. Enter I544 in the search box to learn more about \"Learning About Living [facility]. \"  Current as of: September 24, 2016  Content Version: 11.4  © 7912-2097 [employer]. Care instructions adapted under license by [employer] (which disclaims liability or warranty for this information). If you have questions about a medical condition or this instruction, always ask your healthcare professional. [address] any warranty or liability for your use of this information. Advance Directives: Care Instructions  Your Care Instructions  An advance directive is a legal way to state your wishes at the end of your life. It tells your family and your doctor what to do if you can no longer say what you want. There are two main types of advance directives. You can change them any time that your wishes change. · A living will tells your family and your doctor your wishes about life support and other treatment. · A durable power of  for health care lets you name a person to make treatment decisions for you when you can't speak for yourself. This person is called a health care agent. If you do not have an advance directive, decisions about your medical care may be made by a doctor or a  who doesn't know you. It may help to think of an advance directive as a gift to the people who care for you. If you have one, they won't have to make tough decisions by themselves. Follow-up care is a key part of your treatment and safety. Be sure to make and go to all appointments, and call your doctor if you are having problems. It's also a good idea to know your test results and keep a list of the medicines you take. How can you care for yourself at home? · Discuss your wishes with your loved ones and your doctor. This way, there are no surprises. · Many states have a unique form. Or you might use a universal form that has been approved by many states. This kind of form can sometimes be completed and stored online.  Your electronic copy will then be available wherever you have a connection to the Internet. In most cases, doctors will respect your wishes even if you have a form from a different state. · You don't need a  to do an advance directive. But you may want to get legal advice. · Think about these questions when you prepare an advance directive:  ¨ Who do you want to make decisions about your medical care if you are not able to? Many people choose a family member or close friend. ¨ Do you know enough about life support methods that might be used? If not, talk to your doctor so you understand. ¨ What are you most afraid of that might happen? You might be afraid of having pain, losing your independence, or being kept alive by machines. ¨ Where would you prefer to die? Choices include your home, a hospital, or a nursing home. ¨ Would you like to have information about hospice care to support you and your family? ¨ Do you want to donate organs when you die? ¨ Do you want certain Buddhism practices performed before you die? If so, put your wishes in the advance directive. · Read your advance directive every year, and make changes as needed. When should you call for help? Be sure to contact your doctor if you have any questions. Where can you learn more? Go to http://keshia-sabrina.info/. Enter R264 in the search box to learn more about \"Advance Directives: Care Instructions. \"  Current as of: September 24, 2016  Content Version: 11.4  © 5229-6511 [employer]. Care instructions adapted under license by [employer] (which disclaims liability or warranty for this information). If you have questions about a medical condition or this instruction, always ask your healthcare professional. [address] any warranty or liability for your use of this information. Patient Instructions/Plans:  Escitalopram (By mouth)   Escitalopram ([id number]-pram)  Treats depression and generalized anxiety disorder (ROBERT). Brand Name(s): Lexapro   There may be other brand names for this medicine. When This Medicine Should Not Be Used: This medicine is not right for everyone. Do not use it if you had an allergic reaction to escitalopram or citalopram.  How to Use This Medicine:   Liquid, Tablet  · Take this medicine as directed. You may need to take it for a month or more before you feel better. Your dose may need to be changed to find out what works best for you. · Measure the oral liquid medicine with a marked measuring spoon, oral syringe, or medicine cup. · This medicine should come with a Medication Guide. Ask your pharmacist for a copy if you do not have one. · Missed dose: Take a dose as soon as you remember. If it is almost time for your next dose, wait until then and take a regular dose. Do not take extra medicine to make up for a missed dose. · Store the medicine in a closed container at room temperature, away from heat, moisture, and direct light. Drugs and Foods to Avoid:   Ask your doctor or pharmacist before using any other medicine, including over-the-counter medicines, vitamins, and herbal products. · Do not use this medicine together with pimozide. Do not use this medicine and an MAO inhibitor (MAOI) within 14 days of each other. · Some medicines can affect how escitalopram works. Tell your doctor if you are using the following:   ¨ Buspirone, carbamazepine, fentanyl, lithium, Farzaneh's wort, tramadol, or tryptophan supplements  ¨ Amphetamines  ¨ Blood thinner (including warfarin)  ¨ Diuretic (water pill)  ¨ NSAID pain or arthritis medicine (including aspirin, celecoxib, diclofenac, ibuprofen, naproxen)  ¨ Triptan medicine to treat migraine headaches (including sumatriptan)  · Tell your doctor if you use anything else that makes you sleepy. Some examples are allergy medicine, narcotic pain medicine, and alcohol. · Do not drink alcohol while you are using this medicine.   Warnings While Using This Medicine: · Tell your doctor if you are pregnant or breastfeeding, or if you have kidney disease, liver disease, bleeding problems, glaucoma, heart disease, or a seizure disorder. · For some children, teenagers, and young adults, this medicine may increase mental or emotional problems. This may lead to thoughts of suicide and violence. Talk with your doctor right away if you have any thoughts or behavior changes that concern you. Tell your doctor if you or anyone in your family has a history of bipolar disorder or suicide attempts. · This medicine may cause the following problems:   ¨ Serotonin syndrome (more likely when taken with certain medicines)  ¨ Low sodium levels  ¨ Increased risk of bleeding problems  · This medicine may make you dizzy or drowsy. Do not drive or do anything that could be dangerous until you know how this medicine affects you. · Your doctor may want to monitor your child's weight and height, because this medicine may cause decreased appetite and weight loss in children. · Do not stop using this medicine suddenly. Your doctor will need to slowly decrease your dose before you stop it completely. · Your doctor will check your progress and the effects of this medicine at regular visits. Keep all appointments. · Keep all medicine out of the reach of children. Never share your medicine with anyone.   Possible Side Effects While Using This Medicine:   Call your doctor right away if you notice any of these side effects:  · Allergic reaction: Itching or hives, swelling in your face or hands, swelling or tingling in your mouth or throat, chest tightness, trouble breathing  · Anxiety, restlessness, fever, sweating, muscle spasms, nausea, vomiting, diarrhea, seeing or hearing things that are not there  · Confusion, weakness, and muscle twitching  · Eye pain, vision changes, seeing halos around lights  · Fast, pounding, or uneven heartbeat  · Feeling more excited or energetic than usual, racing thoughts, trouble sleeping  · Seizures  · Thoughts of hurting yourself or others, unusual behavior  · Unusual bleeding or bruising  If you notice these less serious side effects, talk with your doctor:   · Dizziness, drowsiness, or sleepiness  · Dry mouth  · Headache  · Nausea, constipation, diarrhea  · Sexual problems  If you notice other side effects that you think are caused by this medicine, tell your doctor. Call your doctor for medical advice about side effects. You may report side effects to FDA at 9-924-BLF-6711  © 2017 [address] Information is for End User's use only and may not be sold, redistributed or otherwise used for commercial purposes. The above information is an  only. It is not intended as medical advice for individual conditions or treatments. Talk to your doctor, nurse or pharmacist before following any medical regimen to see if it is safe and effective for you.
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
## 2018-05-01 NOTE — PROGRESS NOTES
Chief Complaint   Patient presents with    Memory Loss    Depression     1. Have you been to the ER, urgent care clinic since your last visit? Hospitalized since your last visit? No    2. Have you seen or consulted any other health care providers outside of the [address] [place] [address] since your last visit? Include any pap smears or colon screening.  No     Reviewed record in preparation for visit and have necessary documentation  Pt did not bring medication to office visit for review  Medication list reviewed and reconciled with patient  Information was given to pt on Advanced Directives, Living Will  opportunity was given for questions

## 2018-05-01 NOTE — MR AVS SNAPSHOT
[facility] 
 
 
 [address] [facility] [address] [id number] [phone] Patient: Ekta Jarvis MRN: [id number] :1960 Visit Information Date & Time Provider Department Dept. Phone Encounter #  
 2018  3:00 PM Jose Zendejas MD [facility] Neurology [facility] [phone] [id number] Follow-up Instructions Return in about 6 months (around 2018). Your Appointments 2018  4:00 PM  
WALK IN with Chris Woodson NP [address] [facility] ([address]) Appt Note: WC/ [address] [facility] [facility] [address]  
[phone]  
  
   
 [address] [facility] [address] [facility] [address]  
  
    
 2018  3:40 PM  
Follow Up with Jose Zendejas MD  
[facility]) Appt Note: memory [address] [facility] [address] [address] [phone]  
  
   
 [place] [id number] [address] Upcoming Health Maintenance Date Due Influenza Age 5 to Adult 2018 BREAST CANCER SCRN MAMMOGRAM 3/24/2019 PAP AKA CERVICAL CYTOLOGY 3/24/2020 COLONOSCOPY 2020 DTaP/Tdap/Td series (2 - Td) 2020 Allergies as of 2018  Review Complete On: 2018 By: Robbin Conrad LPN Severity Noted Reaction Type Reactions Erythromycin  2010    Nausea and Vomiting Lisinopril  2015    Other (comments) \"margoth achy\" Current Immunizations  Reviewed on 2014 Name Date Influenza Vaccine 2017, 10/24/2016, 10/1/2014 Influenza Vaccine Whole 10/1/2012 TDAP Vaccine 2010 Not reviewed this visit Vitals BP Pulse Resp Height(growth percentile) LMP SpO2  
 130/82 86 16 5' 2\" (1.575 m) 2010 97% BMI OB Status Smoking Status 35.3 kg/m2 Postmenopausal Never Smoker BMI and BSA Data Body Mass Index Body Surface Area  
 35.3 kg/m 2 1.96 m 2 Preferred Pharmacy Pharmacy Name Phone [facility]/PH[facility]AC[facility] [id number] AT [facility] [phone] Your Updated Medication List  
  
   
This list is accurate as of 5/1/18  4:22 PM.  Always use your most recent med list.  
  
  
  
  
 amitriptyline 10 mg tablet Commonly known as:  ELAVIL TAKE 1 TAB BY MOUTH NIGHTLY FOR 90 DAYS. b complex vitamins tablet Take 1 Tab by mouth daily. donepezil 10 mg tablet Commonly known as:  ARICEPT Take 1 Tab by mouth nightly. escitalopram oxalate 10 mg tablet Commonly known as:  [place] Leydi Take 1 Tab by mouth daily. hydroCHLOROthiazide 25 mg tablet Commonly known as:  HYDRODIURIL  
TAKE 1 TAB BY MOUTH DAILY FOR 90 DAYS. KLOR-CON 10 10 mEq tablet Generic drug:  potassium chloride SR  
TAKE 1 TABLET BY MOUTH TWICE A DAY  
  
 multivitamin tablet Commonly known as:  ONE A DAY Take 1 Tab by mouth daily. omeprazole 20 mg tablet Commonly known as:  PriLOSEC OTC Take 20 mg by mouth daily. rizatriptan 10 mg disintegrating tablet Commonly known as:  MAXALT-MLT Take 1 Tab by mouth once as needed for Migraine for up to 1 dose. * rosuvastatin 10 mg tablet Commonly known as:  CRESTOR Take 1 Tab by mouth nightly for 30 days. * rosuvastatin 10 mg tablet Commonly known as:  CRESTOR Take 1 Tab by mouth nightly. vit a,c & e-lutein-minerals tablet Commonly known as:  OCUVITE  
daily. * Notice: This list has 2 medication(s) that are the same as other medications prescribed for you. Read the directions carefully, and ask your doctor or other care provider to review them with you. Prescriptions Sent to Pharmacy Refills  
 escitalopram oxalate (LEXAPRO) 10 mg tablet 1 Sig: Take 1 Tab by mouth daily.   
 Class: Normal  
 Pharmacy: [facility]/pharmacy [address] [place] [address] Ph #: [phone] Route: Oral  
 donepezil (ARICEPT) 10 mg tablet 1 Sig: Take 1 Tab by mouth nightly. Class: Normal  
 Pharmacy: [address], [address] [facility] Ph #: [phone] Route: Oral  
  
Follow-up Instructions Return in about 6 months (around 11/1/2018). Patient Instructions PRESCRIPTION REFILL POLICY [facility] Statement to Patients April 1, 2014 In an effort to ensure the large volume of patient prescription refills is processed in the most efficient and expeditious manner, we are asking our patients to assist us by calling your Pharmacy for all prescription refills, this will include also your  Mail Order Pharmacy. The pharmacy will contact our office electronically to continue the refill process. Please do not wait until the last minute to call your pharmacy. We need at least 48 hours (2days) to fill prescriptions. We also encourage you to call your pharmacy before going to  your prescription to make sure it is ready. With regard to controlled substance prescription refill requests (narcotic refills) that need to be picked up at our office, we ask your cooperation by providing us with at least 72 hours (3days) notice that you will need a refill. We will not refill narcotic prescription refill requests after 4:00pm on any weekday, Monday through Thursday, or after 2:00pm on Fridays, or on the weekends. We encourage everyone to explore another way of getting your prescription refill request processed using Matchpin, our patient web portal through our electronic medical record system. Matchpin is an efficient and effective way to communicate your medication request directly to the office and  downloadable as an tegan on your smart phone .  Matchpin also features a review functionality that allows you to view your medication list as well as leave messages for your physician. Are you ready to get connected? If so please review the attatched instructions or speak to any of our staff to get you set up right away! Thank you so much for your cooperation. Should you have any questions please contact our Practice Administrator. The Physicians and Staff,  [facility] Anat [address] What is a living will? A living will is a legal form you use to write down the kind of care you want at the end of your life. It is used by the health professionals who will treat you if you aren't able to decide for yourself. If you put your wishes in writing, your loved ones and others will know what kind of care you want. They won't need to guess. This can ease your mind and be helpful to others. A living will is not the same as an estate or property will. An estate will explains what you want to happen with your money and property after you die. Is a living will a legal document? A living will is a legal document. Each state has its own laws about living benites. If you move to another state, make sure that your living will is legal in the state where you now live. Or you might use a universal form that has been approved by many states. This kind of form can sometimes be completed and stored online. Your electronic copy will then be available wherever you have a connection to the Internet. In most cases, doctors will respect your wishes even if you have a form from a different state. · You don't need an  to complete a living will. But legal advice can be helpful if your state's laws are unclear, your health history is complicated, or your family can't agree on what should be in your living will. · You can change your living will at any time. Some people find that their wishes about end-of-life care change as their health changes. · In addition to making a living will, think about completing a medical power of  form. This form lets you name the person you want to make end-of-life treatment decisions for you (your \"health care agent\") if you're not able to. Many hospitals and nursing homes will give you the forms you need to complete a living will and a medical power of . · Your living will is used only if you can't make or communicate decisions for yourself anymore. If you become able to make decisions again, you can accept or refuse any treatment, no matter what you wrote in your living will. · Your state may offer an online registry. This is a place where you can store your living will online so the doctors and nurses who need to treat you can find it right away. What should you think about when creating a living will? Talk about your end-of-life wishes with your family members and your doctor. Let them know what you want. That way the people making decisions for you won't be surprised by your choices. Think about these questions as you make your living will: · Do you know enough about life support methods that might be used? If not, talk to your doctor so you know what might be done if you can't breathe on your own, your heart stops, or you're unable to swallow. · What things would you still want to be able to do after you receive life-support methods? Would you want to be able to walk? To speak? To eat on your own? To live without the help of machines? · If you have a choice, where do you want to be cared for? In your home? At a hospital or nursing home? · Do you want certain Taoism practices performed if you become very ill? · If you have a choice at the end of your life, where would you prefer to die? At home? In a hospital or nursing home? Somewhere else? · Would you prefer to be buried or cremated? · Do you want your organs to be donated after you die? What should you do with your living will? · Make sure that your family members and your health care agent have copies of your living will. · Give your doctor a copy of your living will to keep in your medical record. If you have more than one doctor, make sure that each one has a copy. · You may want to put a copy of your living will where it can be easily found. Where can you learn more? Go to http://keshia-sabrina.info/. Enter E808 in the search box to learn more about \"Learning About Living Jimy Pino. \" Current as of: September 24, 2016 Content Version: 11.4 © 8412-8653 [employer]. Care instructions adapted under license by [employer] (which disclaims liability or warranty for this information). If you have questions about a medical condition or this instruction, always ask your healthcare professional. [address] any warranty or liability for your use of this information. Advance Directives: Care Instructions Your Care Instructions An advance directive is a legal way to state your wishes at the end of your life. It tells your family and your doctor what to do if you can no longer say what you want. There are two main types of advance directives. You can change them any time that your wishes change. · A living will tells your family and your doctor your wishes about life support and other treatment. · A durable power of  for health care lets you name a person to make treatment decisions for you when you can't speak for yourself. This person is called a health care agent. If you do not have an advance directive, decisions about your medical care may be made by a doctor or a  who doesn't know you. It may help to think of an advance directive as a gift to the people who care for you. If you have one, they won't have to make tough decisions by themselves. Follow-up care is a key part of your treatment and safety.  Be sure to make and go to all appointments, and call your doctor if you are having problems. It's also a good idea to know your test results and keep a list of the medicines you take. How can you care for yourself at home? · Discuss your wishes with your loved ones and your doctor. This way, there are no surprises. · Many states have a unique form. Or you might use a universal form that has been approved by many states. This kind of form can sometimes be completed and stored online. Your electronic copy will then be available wherever you have a connection to the Internet. In most cases, doctors will respect your wishes even if you have a form from a different state. · You don't need a  to do an advance directive. But you may want to get legal advice. · Think about these questions when you prepare an advance directive: ¨ Who do you want to make decisions about your medical care if you are not able to? Many people choose a family member or close friend. ¨ Do you know enough about life support methods that might be used? If not, talk to your doctor so you understand. ¨ What are you most afraid of that might happen? You might be afraid of having pain, losing your independence, or being kept alive by machines. ¨ Where would you prefer to die? Choices include your home, a hospital, or a nursing home. ¨ Would you like to have information about hospice care to support you and your family? ¨ Do you want to donate organs when you die? ¨ Do you want certain Religion practices performed before you die? If so, put your wishes in the advance directive. · Read your advance directive every year, and make changes as needed. When should you call for help? Be sure to contact your doctor if you have any questions. Where can you learn more? Go to http://keshia-sabrina.info/. Enter R264 in the search box to learn more about \"Advance Directives: Care Instructions. \" Current as of: September 24, 2016 Content Version: 11.4 © 8883-9807 [employer]. Care instructions adapted under license by [employer] (which disclaims liability or warranty for this information). If you have questions about a medical condition or this instruction, always ask your healthcare professional. [address] any warranty or liability for your use of this information. Patient Instructions/Plans: 
Escitalopram (By mouth) Escitalopram ([id number]-ZMA-oh-pram) Treats depression and generalized anxiety disorder (ROBERT). Brand Name(s): Lexapro There may be other brand names for this medicine. When This Medicine Should Not Be Used: This medicine is not right for everyone. Do not use it if you had an allergic reaction to escitalopram or citalopram. 
How to Use This Medicine:  
Liquid, Tablet · Take this medicine as directed. You may need to take it for a month or more before you feel better. Your dose may need to be changed to find out what works best for you. · Measure the oral liquid medicine with a marked measuring spoon, oral syringe, or medicine cup. · This medicine should come with a Medication Guide. Ask your pharmacist for a copy if you do not have one. · Missed dose: Take a dose as soon as you remember. If it is almost time for your next dose, wait until then and take a regular dose. Do not take extra medicine to make up for a missed dose. · Store the medicine in a closed container at room temperature, away from heat, moisture, and direct light. Drugs and Foods to Avoid: Ask your doctor or pharmacist before using any other medicine, including over-the-counter medicines, vitamins, and herbal products. · Do not use this medicine together with pimozide. Do not use this medicine and an MAO inhibitor (MAOI) within 14 days of each other. · Some medicines can affect how escitalopram works. Tell your doctor if you are using the following: ¨ Buspirone, carbamazepine, fentanyl, lithium, Farzaneh's wort, tramadol, or tryptophan supplements ¨ Amphetamines ¨ Blood thinner (including warfarin) ¨ Diuretic (water pill) ¨ NSAID pain or arthritis medicine (including aspirin, celecoxib, diclofenac, ibuprofen, naproxen) ¨ Triptan medicine to treat migraine headaches (including sumatriptan) · Tell your doctor if you use anything else that makes you sleepy. Some examples are allergy medicine, narcotic pain medicine, and alcohol. · Do not drink alcohol while you are using this medicine. Warnings While Using This Medicine: · Tell your doctor if you are pregnant or breastfeeding, or if you have kidney disease, liver disease, bleeding problems, glaucoma, heart disease, or a seizure disorder. · For some children, teenagers, and young adults, this medicine may increase mental or emotional problems. This may lead to thoughts of suicide and violence. Talk with your doctor right away if you have any thoughts or behavior changes that concern you. Tell your doctor if you or anyone in your family has a history of bipolar disorder or suicide attempts. · This medicine may cause the following problems:  
¨ Serotonin syndrome (more likely when taken with certain medicines) ¨ Low sodium levels ¨ Increased risk of bleeding problems · This medicine may make you dizzy or drowsy. Do not drive or do anything that could be dangerous until you know how this medicine affects you. · Your doctor may want to monitor your child's weight and height, because this medicine may cause decreased appetite and weight loss in children. · Do not stop using this medicine suddenly. Your doctor will need to slowly decrease your dose before you stop it completely. · Your doctor will check your progress and the effects of this medicine at regular visits. Keep all appointments. · Keep all medicine out of the reach of children. Never share your medicine with anyone. Possible Side Effects While Using This Medicine:  
Call your doctor right away if you notice any of these side effects: · Allergic reaction: Itching or hives, swelling in your face or hands, swelling or tingling in your mouth or throat, chest tightness, trouble breathing · Anxiety, restlessness, fever, sweating, muscle spasms, nausea, vomiting, diarrhea, seeing or hearing things that are not there · Confusion, weakness, and muscle twitching · Eye pain, vision changes, seeing halos around lights · Fast, pounding, or uneven heartbeat · Feeling more excited or energetic than usual, racing thoughts, trouble sleeping · Seizures · Thoughts of hurting yourself or others, unusual behavior · Unusual bleeding or bruising If you notice these less serious side effects, talk with your doctor: · Dizziness, drowsiness, or sleepiness · Dry mouth 
· Headache · Nausea, constipation, diarrhea · Sexual problems If you notice other side effects that you think are caused by this medicine, tell your doctor. Call your doctor for medical advice about side effects. You may report side effects to FDA at 8-043-FDA-5043 © 2017 [facility] Information is for End User's use only and may not be sold, redistributed or otherwise used for commercial purposes. The above information is an  only. It is not intended as medical advice for individual conditions or treatments. Talk to your doctor, nurse or pharmacist before following any medical regimen to see if it is safe and effective for you. Introducing [facility]! Dear Rosibel Morrison: 
Thank you for requesting a [employer] account. Our records indicate that you already have an active [employer] account. You can access your account anytime at [email or web address] Did you know that you can access your hospital and ER discharge instructions at any time in [employer]?   You can also review all of your test results from your hospital stay or ER visit. Additional Information If you have questions, please visit the Frequently Asked Questions section of the [employer] website at [email or web address] Remember, Sw[employer]Station is NOT to be used for urgent needs. For medical emergencies, dial 911. Now available from your iPhone and Android! Please provide this summary of care documentation to your next provider. Your primary care clinician is listed as Olga Schwartz. If you have any questions after today's visit, please call [phone].
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
## 2018-05-02 ENCOUNTER — OFFICE VISIT (OUTPATIENT)
Dept: FAMILY MEDICINE CLINIC | Age: 58
End: 2018-05-02

## 2018-05-02 VITALS
SYSTOLIC BLOOD PRESSURE: 127 MMHG | HEIGHT: 62 IN | DIASTOLIC BLOOD PRESSURE: 87 MMHG | HEART RATE: 81 BPM | OXYGEN SATURATION: 93 % | TEMPERATURE: 97.5 F | RESPIRATION RATE: 16 BRPM
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
## 2018-05-02 DIAGNOSIS — S20.211D CONTUSION OF RIB ON RIGHT SIDE, SUBSEQUENT ENCOUNTER: Primary | ICD-10-CM

## 2018-05-02 RX ORDER — RIZATRIPTAN BENZOATE 5 MG/1
TABLET ORAL
COMMUNITY
Start: 2018-05-02 | End: 2018-05-02

## 2018-05-02 RX ORDER — AMITRIPTYLINE HYDROCHLORIDE 75 MG/1
TABLET, FILM COATED ORAL
COMMUNITY
Start: 2018-05-02 | End: 2021-03-26

## 2018-05-02 RX ORDER — HYDROCHLOROTHIAZIDE 100 %
POWDER (GRAM) MISCELLANEOUS
COMMUNITY
Start: 2018-05-02 | End: 2021-03-26

## 2018-05-02 NOTE — PROGRESS NOTES
HISTORY OF PRESENT ILLNESS  Mary Nicholson is a 62 y.o. female. HPI   This lady was going into the main entrance of [facility] when she tripped and fell. It was an accidental fall as she did not have any symptoms prior. Fall was witnessed, no LOC. she landed flat on her hands and knees. She complains of \"pain all over\"  No other complaints    Review of Systems   Constitutional: Negative for fever. Respiratory: Negative for shortness of breath. Cardiovascular: Negative for chest pain and palpitations. Gastrointestinal: Negative for abdominal pain, nausea and vomiting. Musculoskeletal:        Fall   Neurological: Negative for dizziness and headaches. Physical Exam   Constitutional: She appears well-developed and well-nourished. No distress. HENT:   Right Ear: External ear normal.   Left Ear: External ear normal.   Nose: Nose normal.   Mouth/Throat: Oropharynx is clear and moist. No oropharyngeal exudate. Cardiovascular: Normal rate, regular rhythm and normal heart sounds. No murmur heard. Pulmonary/Chest: Effort normal and breath sounds normal. No respiratory distress. She has no wheezes. She has no rales. Abdominal: Soft. There is no tenderness. There is no rebound. Musculoskeletal: Normal range of motion. She exhibits tenderness. She exhibits no edema or deformity. Abrasion right elbow  ttp right rib cage   Skin: She is not diaphoretic. ASSESSMENT and PLAN    ICD-10-CM ICD-9-CM    1.  Contusion of rib on right side, initial encounter S20.211A 922.1 CANCELED: XR RIBS RT UNI 2 V   will get xray right rib detail  Motrin prn  Follow up 2-3 days  workmans comp forms fileld out  Precautions given

## 2018-05-02 NOTE — PROGRESS NOTES
Chief Complaint   Patient presents with    Work Related Injury     she is a 62y.o. year old female who presents for evalution. She is here following up after a fall at the main entrance of [facility]. She states she tripped over the pavement and landed on her right side and front, causing mild abrasions to bilateral palms, right elbow and right knee. She had pain in her right anterior and side rib cage, and was sent for an X-Ray that was negative for any acute fracture or process. She went home and rested for 1 day, and is here today feeling sore, however she feels that she is well enough to work. She mostly is on the computer through out the day, and is not required to do any lifting, bending or stooping. She is able to take frequent rest periods, and has been encouraged to do so. She has been applying ice to painful areas and takes naproxen as needed for discomfort. Reviewed PmHx, RxHx, FmHx, SocHx, AllgHx and updated and dated in the chart. Review of Systems - negative except as listed above in the HPI    Objective:     Vitals:    05/02/18 0807   BP: 127/87   Pulse: 81   Resp: 16   Temp: 97.5 °F (36.4 °C)   TempSrc: Oral   SpO2: 93%   Height: 5' 2\" (1.575 m)       Current Outpatient Prescriptions   Medication Sig    escitalopram oxalate (LEXAPRO) 10 mg tablet Take 1 Tab by mouth daily.  donepezil (ARICEPT) 10 mg tablet Take 1 Tab by mouth nightly.  amitriptyline (ELAVIL) 10 mg tablet TAKE 1 TAB BY MOUTH NIGHTLY FOR 90 DAYS.  hydroCHLOROthiazide (HYDRODIURIL) 25 mg tablet TAKE 1 TAB BY MOUTH DAILY FOR 90 DAYS.  KLOR-CON 10 10 mEq tablet TAKE 1 TABLET BY MOUTH TWICE A DAY    rosuvastatin (CRESTOR) 10 mg tablet Take 1 Tab by mouth nightly.  rizatriptan (MAXALT-MLT) 10 mg disintegrating tablet Take 1 Tab by mouth once as needed for Migraine for up to 1 dose.  b complex vitamins tablet Take 1 Tab by mouth daily.  omeprazole (PRILOSEC OTC) 20 mg tablet Take 20 mg by mouth daily.  (Patient taking differently: Take 20 mg by mouth as needed.)    rosuvastatin (CRESTOR) 10 mg tablet Take 1 Tab by mouth nightly for 30 days.  vit a,c & e-lutein-minerals (OCUVITE) tablet daily. No current facility-administered medications for this visit. Physical Examination: General appearance - alert, well appearing, and in no distress  Mental status - alert, oriented to person, place, and time  Chest - clear to auscultation, no wheezes, rales or rhonchi, symmetric air entry  Heart - normal rate, regular rhythm, normal S1, S2, no murmurs, rubs, clicks or gallops  Musculoskeletal - no joint deformity or swelling, mild bruising and tenderness of right knee, palms, right ribs. Extremities - peripheral pulses normal, no pedal edema, no clubbing or cyanosis. Mild healing abrasions to right knee, elbow and bilateral palms. Skin - normal coloration and turgor, no rashes, no suspicious skin lesions noted      Assessment/ Plan:   Diagnoses and all orders for this visit:    1. Fall, subsequent encounter    2. right rib contusion, mild abrasions to bilateral palms and right elbow. Follow-up Disposition:  Return if symptoms worsen or fail to improve. I have discussed the diagnosis with the patient and the intended plan as seen in the above orders. The patient has received an after-visit summary and questions were answered concerning future plans. Pt conveyed understanding of plan.     Medication Side Effects and Warnings were discussed with patient      Pastor Brennan NP

## 2018-05-02 NOTE — PATIENT INSTRUCTIONS
Preventing Outdoor Falls: Care Instructions  Your Care Instructions    Worries about falls don't need to keep you indoors. Outdoor activities like walking have big benefits for your health. You will need to watch your step and learn a few safety measures. If you are worried about having a fall outdoors, ask your doctor about exercises, classes, or physical therapy that may help. You can learn ways to gain strength, flexibility, and balance. Ask if it might help to use a cane or walker. You can make your time outdoors safer with a few simple measures. Follow-up care is a key part of your treatment and safety. Be sure to make and go to all appointments, and call your doctor if you are having problems. It's also a good idea to know your test results and keep a list of the medicines you take. How can you prevent falls outdoors? · Wear shoes with firm soles and low heels. If you have to walk on an icy surface, use grippers that can be worn over your shoes in bad weather. · Be extra careful if weather is bad. Walk on the grass when the sidewalks are slick. If you live in a place that gets snow and ice in the winter, sprinkle salt on slippery stairs and sidewalks. · Be careful getting on or off buses and trains or getting in and out of cars. If handrails are available, use them. · Be careful when you cross the street. Look for crosswalks or places where curb cuts or ramps are present. · Try not to hurry, especially if you are carrying something. · Be cautious in parking lots or garages. There may be curbs or changes in pavement, or the height of the pavement may vary. · Make sure to wear the correct eyeglasses, if you need them. Reading glasses or bifocals can make it harder to see hazards that might be in your way. · If you are walking outdoors for exercise, try to:  ¨ Walk in well-lighted, well-maintained areas. These include high school or college tracks, shopping malls, and public spaces.   ¨ Walk with a partner. ¨ Watch out for cracked sidewalks, curbs, changes in the height of the pavement, exposed tree roots, and debris such as fallen leaves or branches. Where can you learn more? Go to http://keshia-sabrina.info/. Enter R377 in the search box to learn more about \"Preventing Outdoor Falls: Care Instructions. \"  Current as of: May 12, 2017  Content Version: 11.4  © 6088-6046 Healthwise, [employer]. Care instructions adapted under license by [employer] (which disclaims liability or warranty for this information). If you have questions about a medical condition or this instruction, always ask your healthcare professional. [place] [address] any warranty or liability for your use of this information.

## 2018-05-02 NOTE — PATIENT INSTRUCTIONS
Preventing Outdoor Falls: Care Instructions  Your Care Instructions    Worries about falls don't need to keep you indoors. Outdoor activities like walking have big benefits for your health. You will need to watch your step and learn a few safety measures. If you are worried about having a fall outdoors, ask your doctor about exercises, classes, or physical therapy that may help. You can learn ways to gain strength, flexibility, and balance. Ask if it might help to use a cane or walker. You can make your time outdoors safer with a few simple measures. Follow-up care is a key part of your treatment and safety. Be sure to make and go to all appointments, and call your doctor if you are having problems. It's also a good idea to know your test results and keep a list of the medicines you take. How can you prevent falls outdoors? · Wear shoes with firm soles and low heels. If you have to walk on an icy surface, use grippers that can be worn over your shoes in bad weather. · Be extra careful if weather is bad. Walk on the grass when the sidewalks are slick. If you live in a place that gets snow and ice in the winter, sprinkle salt on slippery stairs and sidewalks. · Be careful getting on or off buses and trains or getting in and out of cars. If handrails are available, use them. · Be careful when you cross the street. Look for crosswalks or places where curb cuts or ramps are present. · Try not to hurry, especially if you are carrying something. · Be cautious in parking lots or garages. There may be curbs or changes in pavement, or the height of the pavement may vary. · Make sure to wear the correct eyeglasses, if you need them. Reading glasses or bifocals can make it harder to see hazards that might be in your way. · If you are walking outdoors for exercise, try to:  ¨ Walk in well-lighted, well-maintained areas. These include high school or college tracks, shopping malls, and public spaces.   ¨ Walk with a partner. ¨ Watch out for cracked sidewalks, curbs, changes in the height of the pavement, exposed tree roots, and debris such as fallen leaves or branches. Where can you learn more? Go to http://keshia-sabrina.info/. Enter Q662 in the search box to learn more about \"Preventing Outdoor Falls: Care Instructions. \"  Current as of: May 12, 2017  Content Version: 11.4  © 3076-3945 Healthwise, [employer]. Care instructions adapted under license by [employer] (which disclaims liability or warranty for this information). If you have questions about a medical condition or this instruction, always ask your healthcare professional. [place] [address] any warranty or liability for your use of this information.

## 2018-05-03 ENCOUNTER — HOSPITAL ENCOUNTER (EMERGENCY)
Age: 58
Discharge: HOME | End: 2018-05-03
Attending: STUDENT IN AN ORGANIZED HEALTH CARE EDUCATION/TRAINING PROGRAM
Payer: OTHER MISCELLANEOUS

## 2018-05-03 ENCOUNTER — APPOINTMENT (OUTPATIENT)
Dept: CT IMAGING | Age: 58
End: 2018-05-03
Attending: STUDENT IN AN ORGANIZED HEALTH CARE EDUCATION/TRAINING PROGRAM
Payer: OTHER MISCELLANEOUS

## 2018-05-03 VITALS
DIASTOLIC BLOOD PRESSURE: 84 MMHG | WEIGHT: 201 LBS | OXYGEN SATURATION: 94 % | HEART RATE: 76 BPM | SYSTOLIC BLOOD PRESSURE: 137 MMHG | HEIGHT: 62 IN | TEMPERATURE: 98.7 F | RESPIRATION RATE: 16 BRPM | BODY MASS INDEX: 36.99 KG/M2

## 2018-05-03 DIAGNOSIS — S20.211A CONTUSION OF RIGHT CHEST WALL, INITIAL ENCOUNTER: Primary | ICD-10-CM

## 2018-05-03 DIAGNOSIS — R91.1 LUNG NODULE SEEN ON IMAGING STUDY: ICD-10-CM

## 2018-05-03 LAB
ALBUMIN SERPL-MCNC: 4.1 G/DL (ref 3.5–5)
ALBUMIN/GLOB SERPL: 1.1 {RATIO} (ref 1.1–2.2)
ALP SERPL-CCNC: 79 U/L (ref 45–117)
ALT SERPL-CCNC: 70 U/L (ref 12–78)
ANION GAP SERPL CALC-SCNC: 8 MMOL/L (ref 5–15)
APPEARANCE UR: CLEAR
AST SERPL-CCNC: 33 U/L (ref 15–37)
BACTERIA URNS QL MICRO: NEGATIVE /HPF
BASOPHILS # BLD: 0 K/UL (ref 0–0.1)
BASOPHILS NFR BLD: 1 % (ref 0–1)
BILIRUB SERPL-MCNC: 0.5 MG/DL (ref 0.2–1)
BILIRUB UR QL: NEGATIVE
BUN SERPL-MCNC: 11 MG/DL (ref 6–20)
BUN/CREAT SERPL: 12 (ref 12–20)
CALCIUM SERPL-MCNC: 9.5 MG/DL (ref 8.5–10.1)
CHLORIDE SERPL-SCNC: 105 MMOL/L (ref 97–108)
CO2 SERPL-SCNC: 27 MMOL/L (ref 21–32)
COLOR UR: NORMAL
CREAT SERPL-MCNC: 0.89 MG/DL (ref 0.55–1.02)
DIFFERENTIAL METHOD BLD: ABNORMAL
EOSINOPHIL # BLD: 0.2 K/UL (ref 0–0.4)
EOSINOPHIL NFR BLD: 3 % (ref 0–7)
EPITH CASTS URNS QL MICRO: NORMAL /LPF
ERYTHROCYTE [DISTWIDTH] IN BLOOD BY AUTOMATED COUNT: 13.2 % (ref 11.5–14.5)
GLOBULIN SER CALC-MCNC: 3.7 G/DL (ref 2–4)
GLUCOSE SERPL-MCNC: 121 MG/DL (ref 65–100)
GLUCOSE UR STRIP.AUTO-MCNC: NEGATIVE MG/DL
HCT VFR BLD AUTO: 47.7 % (ref 35–47)
HGB BLD-MCNC: 16 G/DL (ref 11.5–16)
HGB UR QL STRIP: NEGATIVE
HYALINE CASTS URNS QL MICRO: NORMAL /LPF (ref 0–5)
IMM GRANULOCYTES # BLD: 0 K/UL (ref 0–0.04)
IMM GRANULOCYTES NFR BLD AUTO: 1 % (ref 0–0.5)
KETONES UR QL STRIP.AUTO: NEGATIVE MG/DL
LEUKOCYTE ESTERASE UR QL STRIP.AUTO: NEGATIVE
LIPASE SERPL-CCNC: 457 U/L (ref 73–393)
LYMPHOCYTES # BLD: 1.3 K/UL (ref 0.8–3.5)
LYMPHOCYTES NFR BLD: 21 % (ref 12–49)
MAGNESIUM SERPL-MCNC: 2.1 MG/DL (ref 1.6–2.4)
MCH RBC QN AUTO: 28.8 PG (ref 26–34)
MCHC RBC AUTO-ENTMCNC: 33.5 G/DL (ref 30–36.5)
MCV RBC AUTO: 85.9 FL (ref 80–99)
MONOCYTES # BLD: 0.6 K/UL (ref 0–1)
MONOCYTES NFR BLD: 9 % (ref 5–13)
NEUTS SEG # BLD: 4.3 K/UL (ref 1.8–8)
NEUTS SEG NFR BLD: 67 % (ref 32–75)
NITRITE UR QL STRIP.AUTO: NEGATIVE
NRBC # BLD: 0 K/UL (ref 0–0.01)
NRBC BLD-RTO: 0 PER 100 WBC
PH UR STRIP: 5.5 [PH] (ref 5–8)
PLATELET # BLD AUTO: 217 K/UL (ref 150–400)
PMV BLD AUTO: 10.5 FL (ref 8.9–12.9)
POTASSIUM SERPL-SCNC: 3.8 MMOL/L (ref 3.5–5.1)
PROT SERPL-MCNC: 7.8 G/DL (ref 6.4–8.2)
PROT UR STRIP-MCNC: NEGATIVE MG/DL
RBC # BLD AUTO: 5.55 M/UL (ref 3.8–5.2)
RBC #/AREA URNS HPF: NORMAL /HPF (ref 0–5)
SODIUM SERPL-SCNC: 140 MMOL/L (ref 136–145)
SP GR UR REFRACTOMETRY: 1.01 (ref 1–1.03)
UR CULT HOLD, URHOLD: NORMAL
UROBILINOGEN UR QL STRIP.AUTO: 0.2 EU/DL (ref 0.2–1)
WBC # BLD AUTO: 6.4 K/UL (ref 3.6–11)
WBC URNS QL MICRO: NORMAL /HPF (ref 0–4)

## 2018-05-03 PROCEDURE — 96361 HYDRATE IV INFUSION ADD-ON: CPT

## 2018-05-03 PROCEDURE — 74011250637 HC RX REV CODE- 250/637: Performed by: STUDENT IN AN ORGANIZED HEALTH CARE EDUCATION/TRAINING PROGRAM

## 2018-05-03 PROCEDURE — 99284 EMERGENCY DEPT VISIT MOD MDM: CPT

## 2018-05-03 PROCEDURE — 83690 ASSAY OF LIPASE: CPT | Performed by: STUDENT IN AN ORGANIZED HEALTH CARE EDUCATION/TRAINING PROGRAM

## 2018-05-03 PROCEDURE — 85025 COMPLETE CBC W/AUTO DIFF WBC: CPT | Performed by: STUDENT IN AN ORGANIZED HEALTH CARE EDUCATION/TRAINING PROGRAM

## 2018-05-03 PROCEDURE — 74011636320 HC RX REV CODE- 636/320: Performed by: STUDENT IN AN ORGANIZED HEALTH CARE EDUCATION/TRAINING PROGRAM

## 2018-05-03 PROCEDURE — 71260 CT THORAX DX C+: CPT

## 2018-05-03 PROCEDURE — 74011250636 HC RX REV CODE- 250/636: Performed by: STUDENT IN AN ORGANIZED HEALTH CARE EDUCATION/TRAINING PROGRAM

## 2018-05-03 PROCEDURE — 74011000258 HC RX REV CODE- 258: Performed by: STUDENT IN AN ORGANIZED HEALTH CARE EDUCATION/TRAINING PROGRAM

## 2018-05-03 PROCEDURE — 36415 COLL VENOUS BLD VENIPUNCTURE: CPT | Performed by: STUDENT IN AN ORGANIZED HEALTH CARE EDUCATION/TRAINING PROGRAM

## 2018-05-03 PROCEDURE — 96374 THER/PROPH/DIAG INJ IV PUSH: CPT

## 2018-05-03 PROCEDURE — 81001 URINALYSIS AUTO W/SCOPE: CPT | Performed by: STUDENT IN AN ORGANIZED HEALTH CARE EDUCATION/TRAINING PROGRAM

## 2018-05-03 PROCEDURE — 83735 ASSAY OF MAGNESIUM: CPT | Performed by: STUDENT IN AN ORGANIZED HEALTH CARE EDUCATION/TRAINING PROGRAM

## 2018-05-03 PROCEDURE — 80053 COMPREHEN METABOLIC PANEL: CPT | Performed by: STUDENT IN AN ORGANIZED HEALTH CARE EDUCATION/TRAINING PROGRAM

## 2018-05-03 RX ORDER — SODIUM CHLORIDE 0.9 % (FLUSH) 0.9 %
10 SYRINGE (ML) INJECTION
Status: COMPLETED | OUTPATIENT
Start: 2018-05-03 | End: 2018-05-03

## 2018-05-03 RX ORDER — HYDROMORPHONE HYDROCHLORIDE 2 MG/ML
1 INJECTION, SOLUTION INTRAMUSCULAR; INTRAVENOUS; SUBCUTANEOUS ONCE
Status: COMPLETED | OUTPATIENT
Start: 2018-05-03 | End: 2018-05-03

## 2018-05-03 RX ORDER — HYDROCODONE BITARTRATE AND ACETAMINOPHEN 5; 325 MG/1; MG/1
1 TABLET ORAL
Qty: 6 TAB | Refills: 0 | Status: SHIPPED | OUTPATIENT
Start: 2018-05-03 | End: 2018-10-04

## 2018-05-03 RX ORDER — ONDANSETRON 4 MG/1
4 TABLET, ORALLY DISINTEGRATING ORAL
Status: COMPLETED | OUTPATIENT
Start: 2018-05-03 | End: 2018-05-03

## 2018-05-03 RX ORDER — LIDOCAINE 50 MG/G
PATCH TOPICAL
Qty: 7 EACH | Refills: 0 | Status: SHIPPED | OUTPATIENT
Start: 2018-05-03 | End: 2018-10-04

## 2018-05-03 RX ADMIN — SODIUM CHLORIDE 100 ML: 900 INJECTION, SOLUTION INTRAVENOUS at 08:35

## 2018-05-03 RX ADMIN — SODIUM CHLORIDE 1000 ML: 900 INJECTION, SOLUTION INTRAVENOUS at 07:47

## 2018-05-03 RX ADMIN — IOPAMIDOL 100 ML: 612 INJECTION, SOLUTION INTRAVENOUS at 08:34

## 2018-05-03 RX ADMIN — Medication 10 ML: at 08:34

## 2018-05-03 RX ADMIN — HYDROMORPHONE HYDROCHLORIDE 1 MG: 2 INJECTION, SOLUTION INTRAMUSCULAR; INTRAVENOUS; SUBCUTANEOUS at 07:51

## 2018-05-03 RX ADMIN — ONDANSETRON 4 MG: 4 TABLET, ORALLY DISINTEGRATING ORAL at 08:14

## 2018-05-03 NOTE — ED NOTES
Pt at CT.     9:53 AM  Pt ambulated to restroom, settled back into bed, spouse at bedside. Denies need for further pain medication at this time. 10:49 AM  MD at bedside to update patient.

## 2018-05-03 NOTE — ED NOTES
Pt given discharge instructions, patient education, prescriptions, and follow up information. Pt states understanding. All questions answered. Pt discharged to home in private vehicle with ride, ambulatory. Pt A/Ox4, RA, pain controlled.

## 2018-05-03 NOTE — DISCHARGE INSTRUCTIONS
Chest Contusion: Care Instructions  Your Care Instructions  A chest contusion, or bruise, is caused by a fall or direct blow to the chest. Car crashes, falls, getting punched, and injury from bicycle handlebars are common causes of chest contusions. A very forceful blow to the chest can injure the heart or blood vessels in the chest, the lungs, the airway, the liver, or the spleen. Pain may be caused by an injury to muscles, cartilage, or ribs. Deep breathing, coughing, or sneezing can increase your pain. Lying on the injured area also can cause pain. Follow-up care is a key part of your treatment and safety. Be sure to make and go to all appointments, and call your doctor if you are having problems. It's also a good idea to know your test results and keep a list of the medicines you take. How can you care for yourself at home? · Rest and protect the injured or sore area. Stop, change, or take a break from any activity that may be causing your pain. · Put ice or a cold pack on the area for 10 to 20 minutes at a time. Put a thin cloth between the ice and your skin. · After 2 or 3 days, if your swelling is gone, apply a heating pad set on low or a warm cloth to your chest. Some doctors suggest that you go back and forth between hot and cold. Put a thin cloth between the heating pad and your skin. · Do not wrap or tape your ribs for support. This may cause you to take smaller breaths, which could increase your risk of pneumonia and lung collapse. · Ask your doctor if you can take an over-the-counter pain medicine, such as acetaminophen (Tylenol), ibuprofen (Advil, Motrin), or naproxen (Aleve). Be safe with medicines. Read and follow all instructions on the label. · Take your medicines exactly as prescribed. Call your doctor if you think you are having a problem with your medicine.   · Gentle stretching and massage may help you feel better after a few days of rest. Stretch slowly to the point just before discomfort begins, then hold the stretch for at least 15 to 30 seconds. Do this 3 or 4 times per day. · As your pain gets better, slowly return to your normal activities. Be patient, because chest bruises can take weeks or months to heal. Any increased pain may be a sign that you need to rest a while longer. When should you call for help? Call 911 anytime you think you may need emergency care. For example, call if:  ? · You have severe trouble breathing. ? · You cough up blood. ?Call your doctor now or seek immediate medical care if:  ? · You have belly pain. ? · You are dizzy or lightheaded, or you feel like you may faint. ? · You develop new symptoms with the chest pain. ? · Your chest pain gets worse. ? · You have a fever. ? · You have some shortness of breath. ? · You have a cough that brings up mucus from the lungs. ? Watch closely for changes in your health, and be sure to contact your doctor if:  ? · Your chest pain is not improving after 1 week. Where can you learn more? Go to http://keshia-sabrina.info/. Enter I174 in the search box to learn more about \"Chest Contusion: Care Instructions. \"  Current as of: March 20, 2017  Content Version: 11.4  © 3563-0261 [employer]. Care instructions adapted under license by [employer] (which disclaims liability or warranty for this information). If you have questions about a medical condition or this instruction, always ask your healthcare professional. [place] [address] any warranty or liability for your use of this information. Learning About Lung Nodules  What is a lung nodule? A lung nodule is a growth in the lung. A single nodule surrounded by lung tissue is called a solitary pulmonary nodule. A lung nodule might not cause any symptoms. Your doctor may have found one or more nodules on your lung when you were having a chest X-ray or CT scan.  Or it may have been found during a lung cancer screening. A lung nodule may be caused by an old infection or cancer. It might also be a noncancerous growth. Lung nodules can cause a screening to give an abnormal result. Most nodules do not cause any harm. But without further tests, your doctor can't tell whether an abnormal finding is cancer, a harmless nodule, or something else. What can you expect when you have a lung nodule? Your doctor will look at several risk factors to see how likely it is that the nodule is cancer. He or she will look at:  · Whether you smoke or have ever smoked. · Your age and your family's medical history. · Whether you have ever had lung cancer. · The size and shape of the nodule. · Whether the nodule has changed in size. Your doctor may look at past chest X-rays or CT scans, if available, and compare them. Or you may have a series of CT scans to see if the nodule grows over time. What happens next depends on the risk of the nodule being cancer. · If you have no risk factors and the nodule is small, your doctor may advise doing nothing. · If the risk is small, your doctor may schedule follow-up appointments and tests. You may have more CT scans later to see if the nodule is growing. If the nodule hasn't changed in 3 to 6 months, you may have CT scans every year. If it hasn't changed in 2 years, you may not need any more tests. · If there's a higher risk of cancer, your doctor may:  Mindy Menjivar a PET scan, which may help tell if the nodule is cancerous or not. ¨ Take a sample of tissue from the nodule for testing. This is called a biopsy. ¨ Remove the nodule with surgery. Follow-up care is a key part of your treatment and safety. Be sure to make and go to all appointments, and call your doctor if you are having problems. It's also a good idea to know your test results and keep a list of the medicines you take. Where can you learn more? Go to http://keshia-sabrina.info/.   Enter D228 in the search box to learn more about \"Learning About Lung Nodules. \"  Current as of: May 12, 2017  Content Version: 11.4  © 0637-6273 Healthwise, [employer]. Care instructions adapted under license by [employer] (which disclaims liability or warranty for this information). If you have questions about a medical condition or this instruction, always ask your healthcare professional. [place] [address] any warranty or liability for your use of this information.

## 2018-05-03 NOTE — ED TRIAGE NOTES
Patient arrives to ED wit c/o RUQ abdo and right flank pain since @ 0500 this morning, patient states se fell in the parking lot of this hospital on Monday, se then went to class w/o being seen, patient states she went to [facility] for abdo and right flank pain, patient arrives to this ED with continued pain, no nausea or vomiting noted.  Patient states she ad X-rays done at the [facility],

## 2018-05-03 NOTE — ED PROVIDER NOTES
HPI Comments: 62 y.o. female with past medical history significant for HTN, GERD, migraines, and anxiety who presents from home via private vehicle with chief complaint of flank pain. Pt reports falling 3 days ago in the parking lot here, landing on her R side, denies hitting her head or LOC. Pt reports she was seen later that day for R lateral rib pain at the [facility], states she had negative plain films completed, no fractures. Pt reports throughout last night the pain worsened and no longer responded to ibuprofen. Pt states the pain is exacerbated by deep inspiration or with palpation of the area. Pt states she is not SOB, but reports she can't take a deep breath. Pt denies seeing any bruising around the area. Pt denies any nausea, vomiting, abdominal pain, or cough. There are no other acute medical concerns at this time. Social hx: Nonsmoker; Rare EtOH use  PCP: Malini Zurita MD    Note written by Azalea Helm, as dictated by Oaxna Gee MD 8:01 AM      The history is provided by the patient. No  was used.         Past Medical History:   Diagnosis Date    Anxiety     Desmoid fibromatosis     chest    Dysfunctional uterine bleeding 8/17/2011    GERD (gastroesophageal reflux disease)     Hyperlipemia     Hypertension     Insomnia     severe, sleep study normal 2011    Migraine 2/24/2012       Past Surgical History:   Procedure Laterality Date    CHEST SURGERY PROCEDURE UNLISTED      desmoid tumor    ENDOSCOPY, COLON, DIAGNOSTIC  5/2010    HX BREAST BIOPSY Right     benign    HX GYN      d and c    HX OTHER SURGICAL      tumor removal in chest removed         Family History:   Problem Relation Age of Onset    Thyroid Disease Mother      goiter    Hypertension Father     Stroke Father     Thyroid Disease Sister      nodules    Hypertension Sister     Cancer Maternal Grandmother      polycythemia    Cancer Paternal Grandmother      breast    Breast Cancer Paternal Grandmother     Cancer Paternal Grandfather      lung cancer    Other Sister      lyme disease    No Known Problems Sister        Social History     Social History    Marital status:      Spouse name: N/A    Number of children: N/A    Years of education: N/A     Occupational History    Not on file. Social History Main Topics    Smoking status: Never Smoker    Smokeless tobacco: Never Used    Alcohol use Yes      Comment: rarely    Drug use: No    Sexual activity: Yes     Partners: Male     Other Topics Concern    Not on file     Social History Narrative         ALLERGIES: Erythromycin and Lisinopril    Review of Systems   Constitutional: Negative for chills and fever. HENT: Negative for sore throat. Respiratory: Negative for cough and shortness of breath. Cardiovascular: Negative for chest pain. Gastrointestinal: Negative for abdominal pain, nausea and vomiting. Genitourinary: Positive for flank pain. Negative for dysuria. Musculoskeletal: Negative for back pain. Skin: Negative for color change and rash. Neurological: Negative for syncope and headaches. Psychiatric/Behavioral: Negative for confusion. All other systems reviewed and are negative. Vitals:    05/03/18 0733   BP: (!) 155/104   Pulse: (!) 112   Resp: 20   Temp: 98.6 °F (37 °C)   SpO2: 95%   Weight: 91.2 kg (201 lb)   Height: 5' 2\" (1.575 m)            Physical Exam   Constitutional: She is oriented to person, place, and time. She appears well-developed. She appears distressed. Mild distress secondary to pain. HENT:   Head: Normocephalic and atraumatic. Eyes: Conjunctivae and EOM are normal. Pupils are equal, round, and reactive to light. Neck: Normal range of motion. Neck supple. Cardiovascular: Normal rate, regular rhythm and normal heart sounds. No murmur heard. Pulmonary/Chest: Effort normal and breath sounds normal. No respiratory distress.  She exhibits tenderness. She exhibits no crepitus. Exquisitely tender over L lateral chest wall over ribs 5 and 6. No crepitus. Abdominal: Soft. Bowel sounds are normal. She exhibits no distension. There is no tenderness. There is no rebound. Musculoskeletal: Normal range of motion. She exhibits no edema. Neurological: She is alert and oriented to person, place, and time. No cranial nerve deficit. She exhibits normal muscle tone. Coordination normal.   Skin: Skin is warm and dry. No ecchymosis and no rash noted. No ecchymosis. Psychiatric: She has a normal mood and affect. Her behavior is normal.   Nursing note and vitals reviewed. Note written by Azalea Hayes, as dictated by Heather Conrad MD 8:01 AM     [facility]      ED Course       Procedures      The patient presented with a complaint of a fall or minor trauma. The patient is now resting comfortably and feels better, is alert and in no distress. The patient has a normal mental status and is neurologically intact. The history, exam, diagnostic testing (if any) and current condition do not demonstrate signs of clinically significant intra-cranial, intra-thoracic, intra-abdominal, or musculoskeletal trauma. The vital signs have been stable. The patient's condition is stable and appropriate for discharge. The patient will pursue further outpatient evaluation with the primary care physician or other designated or consulting physician as indicated in the discharge instructions.

## 2018-05-04 ENCOUNTER — TELEPHONE (OUTPATIENT)
Dept: INTERNAL MEDICINE CLINIC | Age: 58
End: 2018-05-04

## 2018-05-04 ENCOUNTER — OFFICE VISIT (OUTPATIENT)
Dept: INTERNAL MEDICINE CLINIC | Age: 58
End: 2018-05-04

## 2018-05-04 VITALS
SYSTOLIC BLOOD PRESSURE: 137 MMHG | DIASTOLIC BLOOD PRESSURE: 73 MMHG | OXYGEN SATURATION: 96 % | HEIGHT: 62 IN | HEART RATE: 73 BPM | TEMPERATURE: 99 F | RESPIRATION RATE: 14 BRPM

## 2018-05-04 DIAGNOSIS — I10 ESSENTIAL HYPERTENSION: Chronic | ICD-10-CM

## 2018-05-04 DIAGNOSIS — R91.1 PULMONARY NODULE: Primary | ICD-10-CM

## 2018-05-04 DIAGNOSIS — E78.5 DYSLIPIDEMIA: ICD-10-CM

## 2018-05-04 RX ORDER — HYDROCHLOROTHIAZIDE 25 MG/1
TABLET ORAL
Refills: 0 | COMMUNITY
Start: 2018-04-12 | End: 2018-11-06

## 2018-05-04 RX ORDER — AMITRIPTYLINE HYDROCHLORIDE 10 MG/1
TABLET, FILM COATED ORAL
Refills: 0 | COMMUNITY
Start: 2018-04-12 | End: 2018-10-04

## 2018-05-04 NOTE — TELEPHONE ENCOUNTER
Patient fell at work and had to have a MRI and they found lesion on her Lung and needs to go and See a Pulmonary DrStephany Vela takes her Ins [employer].  Her no [phone] at home no     Cell no [phone]

## 2018-05-04 NOTE — PROGRESS NOTES
HISTORY OF PRESENT ILLNESS  Lieutenant Eller is a 62 y.o. female. HPI   She fell forward on uneven pavement on Wednesday. Patient admits to lesions on arms and abdominal/flank pain. She states she went to ER yesterday because the pain was debilitating. She was admitted on 5/3/18 for contusion of right chest wall and lung nodule seen on imaging study. CT scan compared to CT cardiac calcium score study in 2014 showed unchanged 5 x 6 x 4 mm right middle lobe pulmonary nodule. Patient continues Lexapro and Aricept, followed by Dr. Venus Boas. Patient notes her memory has not changed. Hypertension ROS: taking medications as instructed, no medication side effects noted, no TIA's, no chest pain on exertion, no dyspnea on exertion, no swelling of ankles. New concerns:  Patient's BP in office today is 137/73. Dyslipidemia:  Cardiovascular risk analysis - 62 y.o. female LDL goal is under 130. ROS: taking medications as instructed, no medication side effects noted, no TIA's, no chest pain on exertion, no dyspnea on exertion, no swelling of ankles. Tolerating meds, no myalgias or other side effects noted  New concerns: Last LDL was 85 on 8/18/17. Review of Systems   All other systems reviewed and are negative. Physical Exam   Constitutional: She is oriented to person, place, and time. She appears well-developed and well-nourished. HENT:   Head: Normocephalic and atraumatic. Right Ear: External ear normal.   Left Ear: External ear normal.   Nose: Nose normal.   Mouth/Throat: Oropharynx is clear and moist.   Eyes: Conjunctivae and EOM are normal.   Neck: Normal range of motion. Neck supple. Carotid bruit is not present. No thyroid mass and no thyromegaly present. Cardiovascular: Normal rate, regular rhythm, S1 normal, S2 normal, normal heart sounds and intact distal pulses. Pulmonary/Chest: Effort normal and breath sounds normal.   Abdominal: Soft.  Normal appearance and bowel sounds are normal. There is no hepatosplenomegaly. There is no tenderness. Musculoskeletal: Normal range of motion. Neurological: She is alert and oriented to person, place, and time. She has normal strength. No cranial nerve deficit or sensory deficit. Coordination normal.   Skin: Skin is warm, dry and intact. No abrasion and no rash noted. Psychiatric: She has a normal mood and affect. Her behavior is normal. Judgment and thought content normal.   Nursing note and vitals reviewed. ASSESSMENT and PLAN  Diagnoses and all orders for this visit:    1. Pulmonary nodule  Unchanged 5 x 6 x 4 mm nodule in right middle lobe. We will follow up for repeat CT in 6 months. It is small and not a smoker so she is going to follow up with me in 6 months so I can recheck the CT    2. Essential hypertension  BP is at goal. I do not recommend any change in medications. 3. Dyslipidemia  Stable, patient tolerating meds, no myalgias. I do not recommend any change in medications. lab results and schedule of future lab studies reviewed with patient  reviewed diet, exercise and weight control    Written by Tamara Walker, as dictated by Roddie Lombard, MD.     Current diagnosis and concerns discussed with pt at length. Understands risks and benefits or current treatment plan and medications and accepts the treatment and medication with any possible risks. Pt asks appropriate questions which were answered. Pt instructed to call with any concerns or problems.

## 2018-05-07 ENCOUNTER — OFFICE VISIT (OUTPATIENT)
Dept: FAMILY MEDICINE CLINIC | Age: 58
End: 2018-05-07

## 2018-05-07 VITALS
WEIGHT: 198 LBS | HEIGHT: 62 IN | DIASTOLIC BLOOD PRESSURE: 108 MMHG | BODY MASS INDEX: 36.44 KG/M2 | RESPIRATION RATE: 16 BRPM | TEMPERATURE: 95.6 F | SYSTOLIC BLOOD PRESSURE: 136 MMHG | OXYGEN SATURATION: 98 %

## 2018-05-07 DIAGNOSIS — S20.211D RIB CONTUSION, RIGHT, SUBSEQUENT ENCOUNTER: Primary | ICD-10-CM

## 2018-05-07 RX ORDER — CYCLOBENZAPRINE HCL 10 MG
10 TABLET ORAL
Qty: 30 TAB | Refills: 0 | Status: SHIPPED | OUTPATIENT
Start: 2018-05-07 | End: 2018-11-06

## 2018-05-07 NOTE — PROGRESS NOTES
HISTORY OF PRESENT ILLNESS  Nneka Calderon is a 62 y.o. female. HPI   This lady returns to the clinic in follow up for a fall she sustained at work and has been evaluated here at least twice. Studies have included xraya and most recently a CT of her chest after she went to the ed. She reports \"spasms\" in her right ribs, worse at night and occasional pain with deep inspiration and movement. Difficult to drive secondary to pain    Review of Systems   Constitutional: Negative for chills and fever. Respiratory: Negative for cough and shortness of breath. Cardiovascular:        Right rib contusion   Gastrointestinal: Negative for abdominal pain, nausea and vomiting. Musculoskeletal: Negative for myalgias. Physical Exam   Constitutional: She appears well-developed and well-nourished. No distress. HENT:   Right Ear: External ear normal.   Left Ear: External ear normal.   Nose: Nose normal.   Mouth/Throat: No oropharyngeal exudate. Cardiovascular: Normal rate, regular rhythm and normal heart sounds. Pulmonary/Chest: Effort normal and breath sounds normal. No respiratory distress. She has no wheezes. Good air movement bilaterally   Abdominal: Soft. There is no tenderness. Skin: She is not diaphoretic.        ASSESSMENT and PLAN    ICD-10-CM ICD-9-CM    1. Rib contusion, right, subsequent encounter U45.203X V58.89      922.1    will add flexeril  workmans comp forms filled out  Follow up 2-3 days  Precautions given

## 2018-05-07 NOTE — PROGRESS NOTES
Chief Complaint   Patient presents with    Follow-up     Follow up from fall on 04/30/2018. Right side pain still.

## 2018-05-10 ENCOUNTER — OFFICE VISIT (OUTPATIENT)
Dept: FAMILY MEDICINE CLINIC | Age: 58
End: 2018-05-10

## 2018-05-10 VITALS
SYSTOLIC BLOOD PRESSURE: 128 MMHG | TEMPERATURE: 96 F | BODY MASS INDEX: 36.62 KG/M2 | HEIGHT: 62 IN | RESPIRATION RATE: 16 BRPM | OXYGEN SATURATION: 96 % | HEART RATE: 107 BPM | WEIGHT: 199 LBS | DIASTOLIC BLOOD PRESSURE: 97 MMHG

## 2018-05-10 DIAGNOSIS — S29.011D MUSCLE STRAIN OF CHEST WALL, SUBSEQUENT ENCOUNTER: ICD-10-CM

## 2018-05-10 DIAGNOSIS — S20.211D RIB CONTUSION, RIGHT, SUBSEQUENT ENCOUNTER: Primary | ICD-10-CM

## 2018-05-10 RX ORDER — METHYLPREDNISOLONE 4 MG/1
TABLET ORAL
Qty: 1 DOSE PACK | Refills: 0 | Status: SHIPPED | OUTPATIENT
Start: 2018-05-10 | End: 2018-10-04 | Stop reason: ALTCHOICE

## 2018-05-10 NOTE — PROGRESS NOTES
HISTORY OF PRESENT ILLNESS  Duane Henson is a 62 y.o. female. Patient presents for follow-up after fall about 10 days ago. She fell forward walking into work. Initially had pain all over, but worst pain in right rib region. CXR and CT did not reveal any rib fracture. She does has an unchanged pulmonary nodule that she has followed up with her PCP for in the last week. Patient has been taking NSAIDS daily with relief and started flexeril 3 days ago with relief. NO heat or ice application. Patient feels like she can return to work at this point, as long as she doesn't have to do any heavy lifting. No shortness of breath, but feels like it is difficult to take a deep breath at times due to the pain. Visit Vitals    BP (!) 128/97    Pulse (!) 107    Temp 96 °F (35.6 °C) (Oral)    Resp 16    Ht 5' 2\" (1.575 m)    Wt 199 lb (90.3 kg)    LMP 11/07/2010    SpO2 96%    BMI 36.4 kg/m2       Follow-up   The history is provided by the patient. Episode onset: 10 days ago. Review of Systems   Musculoskeletal: Positive for myalgias (right rib pain). Physical Exam   Constitutional: She is oriented to person, place, and time. She appears well-developed and well-nourished. Cardiovascular: Normal rate and regular rhythm. Pulmonary/Chest: Effort normal and breath sounds normal.   Musculoskeletal:   Right rib pain with twisting from side to side or forward flexion, but denies pain with palpation at time of visit, no bruising or edema noted   Neurological: She is alert and oriented to person, place, and time. Skin: Skin is warm and dry. Psychiatric: She has a normal mood and affect. ASSESSMENT and PLAN    ICD-10-CM ICD-9-CM    1. Rib contusion, right, subsequent encounter Z65.324E V58.89      922.1    2.  Muscle strain of chest wall, subsequent encounter S29.011D V58.89      848.8      Orders Placed This Encounter    methylPREDNISolone (MEDROL DOSEPACK) 4 mg tablet   workers comp form completed  Return to work today, no heavy lifting  Try alternating heat/ice  Practice deep breathing exercises  Medrol dose pack with flexeril at night  Follow-up in 1 week

## 2018-05-10 NOTE — PROGRESS NOTES
Chief Complaint   Patient presents with    Follow-up     Pain better in right side.  Using Aleve with relief this am

## 2018-05-21 RX ORDER — ROSUVASTATIN CALCIUM 10 MG/1
TABLET, COATED ORAL
Qty: 90 TAB | Refills: 1 | Status: SHIPPED | OUTPATIENT
Start: 2018-05-21 | End: 2019-01-09 | Stop reason: SDUPTHER

## 2018-05-22 ENCOUNTER — OFFICE VISIT (OUTPATIENT)
Dept: FAMILY MEDICINE CLINIC | Age: 58
End: 2018-05-22

## 2018-05-22 VITALS
BODY MASS INDEX: 36.62 KG/M2 | TEMPERATURE: 96.9 F | RESPIRATION RATE: 16 BRPM | DIASTOLIC BLOOD PRESSURE: 89 MMHG | OXYGEN SATURATION: 95 % | HEART RATE: 83 BPM | SYSTOLIC BLOOD PRESSURE: 129 MMHG | HEIGHT: 62 IN | WEIGHT: 199 LBS
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
## 2018-05-22 DIAGNOSIS — S29.011D MUSCLE STRAIN OF CHEST WALL, SUBSEQUENT ENCOUNTER: Primary | ICD-10-CM

## 2018-05-22 NOTE — PROGRESS NOTES
Chief Complaint   Patient presents with    Follow-up     Follow up for pain in right side.  No pain this am.

## 2018-05-22 NOTE — PROGRESS NOTES
HISTORY OF PRESENT ILLNESS  Lieutenant Eller is a 62 y.o. female. HPI  Chief Complaint   Patient presents with    Follow-up     Follow up for pain in right side. No pain this am.     Pt is seen here today in follow-up to [facility] 4/30/2018. She was walking on sidewalk when the toe of her shoe caught on uneven pavement. Sammuel Rhymes to ground and had pain to right side. Her right chest was bothering her the most but is much better now. She has been working light duty and feels ready to return to work without restrictions. Denies any pain. Not using any meds for pain. Previous office visit notes and imaging studies reviewed. ROS  A comprehensive review of systems was obtained and negative except findings in the HPI    Physical Exam   Constitutional: She appears well-developed and well-nourished. No distress. Pulmonary/Chest: Effort normal. No respiratory distress. She exhibits no tenderness. Musculoskeletal:        Right elbow: Normal.She exhibits normal range of motion, no swelling and no deformity. No tenderness found. Right knee: Normal. She exhibits normal range of motion. Neurological: Gait normal.       ASSESSMENT and PLAN    ICD-10-CM ICD-9-CM    1. Muscle strain of chest wall, subsequent encounter S29.011D V58.89      848.8    2. Fall, subsequent encounter W19. XXXD V58.89      E888.9      Cleared to return to work full duty without restrictions, WSR form completed and provided to emp. RTC prn. Riley Bedolla, REGINA  This note will not be viewable in [address]

## 2018-05-22 NOTE — PATIENT INSTRUCTIONS
Muscle Strain: Care Instructions  Your Care Instructions    A muscle strain happens when you overstretch, or pull, a muscle. It can happen when you exercise or lift something or when you have an accident. Rest and other home care can help the muscle heal.  Follow-up care is a key part of your treatment and safety. Be sure to make and go to all appointments, and call your doctor if you are having problems. It's also a good idea to know your test results and keep a list of the medicines you take. How can you care for yourself at home? · Rest the strained muscle. Do not put weight on it for a day or two. If your doctor advises you to, use crutches or a sling to rest a sore limb. · Put ice or a cold pack on the sore muscle for 10 to 20 minutes at a time to stop swelling. Put a thin cloth between the ice pack and your skin. · Prop up the sore arm or leg on a pillow when you ice it or anytime you sit or lie down during the next 3 days. Try to keep it above the level of your heart. This will help reduce swelling. · Take pain medicines exactly as directed. ¨ If the doctor gave you a prescription medicine for pain, take it as prescribed. ¨ If you are not taking a prescription pain medicine, ask your doctor if you can take an over-the-counter medicine. · Do not do anything that makes the pain worse. Return to exercise gradually as you feel better. When should you call for help? Call your doctor now or seek immediate medical care if:  ? · You have new severe pain. ? · Your injured limb is cool or pale or changes color. ? · You have tingling, weakness, or numbness in your injured limb. ? · You cannot move the injured area. ? Watch closely for changes in your health, and be sure to contact your doctor if:  ? · You cannot put weight on a joint, or it feels unsteady when you walk. ? · Pain and swelling get worse or do not start to get better after 2 days of home treatment. Where can you learn more?   Go to http://keshia-sabrina.info/. Enter G383 in the search box to learn more about \"Muscle Strain: Care Instructions. \"  Current as of: March 21, 2017  Content Version: 11.4  © 8677-3993 [employer], [employer]. Care instructions adapted under license by [employer] (which disclaims liability or warranty for this information). If you have questions about a medical condition or this instruction, always ask your healthcare professional. [place] [address] any warranty or liability for your use of this information.

## 2018-05-22 NOTE — MR AVS SNAPSHOT
[facility] 
 
 
 5875 [facility], [facility] 104 [address] [place] [address] 
[phone] Patient: Kimberly Suárez MRN: [id number] :1960 Visit Information Date & Time Provider Department Dept. Phone Encounter #  
 2018  8:45 AM Torri Verduzco NP 1400 [facility] [phone] [id number] Follow-up Instructions Return if symptoms worsen or fail to improve. Your Appointments 2018  3:40 PM  
Follow Up with Jermaine Villarreal MD  
 [facility] ([address] [place] [address]) Appt Note: memory [place] [address] [id number] [phone]  
  
   
 [facility]  
  
    
 2018  8:45 AM  
ROUTINE CARE with Kellen Badillo MD  
[facility] [address] Appt Note: 6 mo  
 [place] [address] [address]  
[phone]  
  
   
 [address] [place] [address] Upcoming Health Maintenance Date Due Influenza Age 5 to Adult 2018 BREAST CANCER SCRN MAMMOGRAM 3/24/2019 PAP AKA CERVICAL CYTOLOGY 3/24/2020 COLONOSCOPY 2020 DTaP/Tdap/Td series (2 - Td) 2020 Allergies as of 2018  Review Complete On: 2018 By: Torri Verduzco NP Severity Noted Reaction Type Reactions Erythromycin  2010    Nausea and Vomiting Lisinopril  2015    Other (comments) \"margoth achy\" Current Immunizations  Reviewed on 2014 Name Date Influenza Vaccine 2017, 10/24/2016, 10/1/2014 Influenza Vaccine Whole 10/1/2012 TDAP Vaccine 2010 Not reviewed this visit You Were Diagnosed With   
  
 Codes Comments Muscle strain of chest wall, subsequent encounter    -  Primary ICD-10-CM: S29.011D ICD-9-CM: V58.89, 848.8 Fall, subsequent encounter     ICD-10-CM: W19. Surekha Alejandro ICD-9-CM: V58.89, E888.9 Vitals BP Pulse Temp Resp Height(growth percentile) Weight(growth percentile) 129/89 83 96.9 °F (36.1 °C) (Oral) 16 5' 2\" (1.575 m) 199 lb (90.3 kg) LMP SpO2 BMI OB Status Smoking Status 11/07/2010 95% 36.4 kg/m2 Postmenopausal Never Smoker BMI and BSA Data Body Mass Index Body Surface Area  
 36.4 kg/m 2 1.99 m 2 Preferred Pharmacy Pharmacy Name Phone [id number]- [place], [address] [facility] [phone] Your Updated Medication List  
  
   
This list is accurate as of 5/22/18  8:50 AM.  Always use your most recent med list.  
  
  
  
  
 * amitriptyline 10 mg tablet Commonly known as:  ELAVIL TAKE 1 TAB BY MOUTH NIGHTLY FOR 90 DAYS. * amitriptyline 75 mg tablet Commonly known as:  ELAVIL Take  by mouth.  
  
 b complex vitamins tablet Take 1 Tab by mouth daily. cyclobenzaprine 10 mg tablet Commonly known as:  FLEXERIL Take 1 Tab by mouth three (3) times daily (with meals). donepezil 10 mg tablet Commonly known as:  ARICEPT Take 1 Tab by mouth nightly. escitalopram oxalate 10 mg tablet Commonly known as:  Harini Bors Take 1 Tab by mouth daily. * hydroCHLOROthiazide 25 mg tablet Commonly known as:  HYDRODIURIL  
TAKE 1 TAB BY MOUTH DAILY FOR 90 DAYS. * Hydrochlorothiazide 100 % Powd Take  by mouth. HYDROcodone-acetaminophen 5-325 mg per tablet Commonly known as:  Verlee Shack Take 1 Tab by mouth every four (4) hours as needed for Pain. Max Daily Amount: 6 Tabs. KLOR-CON 10 10 mEq tablet Generic drug:  potassium chloride SR  
TAKE 1 TABLET BY MOUTH TWICE A DAY  
  
 lidocaine 5 % Commonly known as:  Alexandra Blue Apply patch to the affected area for 12 hours a day and remove for 12 hours a day. methylPREDNISolone 4 mg tablet Commonly known as:  Jyoti Wilson Follow dose pack instructions  
  
 omeprazole 20 mg tablet Commonly known as:  PriLOSEC OTC Take 20 mg by mouth daily. rosuvastatin 10 mg tablet Commonly known as:  CRESTOR  
TAKE 1 TAB BY MOUTH NIGHTLY.  
  
 vit a,c & e-lutein-minerals tablet Commonly known as:  OCUVITE  
daily. * Notice: This list has 4 medication(s) that are the same as other medications prescribed for you. Read the directions carefully, and ask your doctor or other care provider to review them with you. Follow-up Instructions Return if symptoms worsen or fail to improve. Patient Instructions Muscle Strain: Care Instructions Your Care Instructions A muscle strain happens when you overstretch, or pull, a muscle. It can happen when you exercise or lift something or when you have an accident. Rest and other home care can help the muscle heal. 
Follow-up care is a key part of your treatment and safety. Be sure to make and go to all appointments, and call your doctor if you are having problems. It's also a good idea to know your test results and keep a list of the medicines you take. How can you care for yourself at home? · Rest the strained muscle. Do not put weight on it for a day or two. If your doctor advises you to, use crutches or a sling to rest a sore limb. · Put ice or a cold pack on the sore muscle for 10 to 20 minutes at a time to stop swelling. Put a thin cloth between the ice pack and your skin. · Prop up the sore arm or leg on a pillow when you ice it or anytime you sit or lie down during the next 3 days. Try to keep it above the level of your heart. This will help reduce swelling. · Take pain medicines exactly as directed. ¨ If the doctor gave you a prescription medicine for pain, take it as prescribed. ¨ If you are not taking a prescription pain medicine, ask your doctor if you can take an over-the-counter medicine. · Do not do anything that makes the pain worse. Return to exercise gradually as you feel better. When should you call for help? Call your doctor now or seek immediate medical care if: 
? · You have new severe pain. ? · Your injured limb is cool or pale or changes color. ? · You have tingling, weakness, or numbness in your injured limb. ? · You cannot move the injured area. ? Watch closely for changes in your health, and be sure to contact your doctor if: 
? · You cannot put weight on a joint, or it feels unsteady when you walk. ? · Pain and swelling get worse or do not start to get better after 2 days of home treatment. Where can you learn more? Go to http://keshia-sabrina.info/. Enter M477 in the search box to learn more about \"Muscle Strain: Care Instructions. \" Current as of: March 21, 2017 Content Version: 11.4 © 7422-1298 [employer]. Care instructions adapted under license by [employer] (which disclaims liability or warranty for this information). If you have questions about a medical condition or this instruction, always ask your healthcare professional. [place] [address] any warranty or liability for your use of this information. Introducing [facility]! Dear Cheyanne Kimball: 
Thank you for requesting a [employer] account. Our records indicate that you already have an active [employer] account. You can access your account anytime at [email or web address] Did you know that you can access your hospital and ER discharge instructions at any time in [employer]? You can also review all of your test results from your hospital stay or ER visit. Additional Information If you have questions, please visit the Frequently Asked Questions section of the [employer] website at [email or web address] Remember, [employer] is NOT to be used for urgent needs. For medical emergencies, dial 911. Now available from your iPhone and Android! Please provide this summary of care documentation to your next provider. Your primary care clinician is listed as Margaret Bautista. If you have any questions after today's visit, please call [phone].

## 2018-08-05 RX ORDER — AMITRIPTYLINE HYDROCHLORIDE 10 MG/1
TABLET, FILM COATED ORAL
Qty: 90 TAB | Refills: 0 | Status: SHIPPED | OUTPATIENT
Start: 2018-08-05 | End: 2018-11-10 | Stop reason: SDUPTHER

## 2018-08-21 ENCOUNTER — TELEPHONE (OUTPATIENT)
Dept: INTERNAL MEDICINE CLINIC | Age: 58
End: 2018-08-21

## 2018-08-21 DIAGNOSIS — G43.919 INTRACTABLE MIGRAINE WITHOUT STATUS MIGRAINOSUS, UNSPECIFIED MIGRAINE TYPE: Chronic | ICD-10-CM

## 2018-08-21 NOTE — TELEPHONE ENCOUNTER
Patient states she has a migraine is requesting a medication to be sent to her pharmacy. States this isnt something new for her when I explained an apt may be required.

## 2018-08-22 RX ORDER — RIZATRIPTAN BENZOATE 10 MG/1
TABLET, ORALLY DISINTEGRATING ORAL
Qty: 9 TAB | Refills: 0 | Status: SHIPPED | OUTPATIENT
Start: 2018-08-22 | End: 2018-11-09 | Stop reason: SDUPTHER

## 2018-08-22 NOTE — TELEPHONE ENCOUNTER
V/m left for patient notifying a refill for her Maxalt has been sent to the pharmacy on file. Advised if her migraine is not responding to the Maxalt needs appt for further evaluation. Office number left if patient has additional questions or concerns.

## 2018-10-04 ENCOUNTER — OFFICE VISIT (OUTPATIENT)
Dept: INTERNAL MEDICINE CLINIC | Age: 58
End: 2018-10-04

## 2018-10-04 VITALS
SYSTOLIC BLOOD PRESSURE: 133 MMHG | DIASTOLIC BLOOD PRESSURE: 86 MMHG | TEMPERATURE: 97.7 F | HEIGHT: 62 IN | HEART RATE: 89 BPM | OXYGEN SATURATION: 98 % | RESPIRATION RATE: 18 BRPM

## 2018-10-04 DIAGNOSIS — R09.81 SINUS CONGESTION: Primary | ICD-10-CM

## 2018-10-04 DIAGNOSIS — J45.20 MILD INTERMITTENT REACTIVE AIRWAY DISEASE WITHOUT COMPLICATION: ICD-10-CM

## 2018-10-04 DIAGNOSIS — E78.5 DYSLIPIDEMIA: ICD-10-CM

## 2018-10-04 DIAGNOSIS — G47.09 OTHER INSOMNIA: ICD-10-CM

## 2018-10-04 DIAGNOSIS — I10 ESSENTIAL HYPERTENSION: Chronic | ICD-10-CM

## 2018-10-04 DIAGNOSIS — G31.84 MCI (MILD COGNITIVE IMPAIRMENT): ICD-10-CM

## 2018-10-04 DIAGNOSIS — J45.21 MILD INTERMITTENT REACTIVE AIRWAY DISEASE WITH ACUTE EXACERBATION: ICD-10-CM

## 2018-10-04 RX ORDER — AMOXICILLIN AND CLAVULANATE POTASSIUM 875; 125 MG/1; MG/1
1 TABLET, FILM COATED ORAL EVERY 12 HOURS
Qty: 20 TAB | Refills: 0 | Status: SHIPPED | OUTPATIENT
Start: 2018-10-04 | End: 2018-11-06

## 2018-10-04 RX ORDER — ALBUTEROL SULFATE 90 UG/1
1 AEROSOL, METERED RESPIRATORY (INHALATION)
Qty: 1 INHALER | Refills: 2 | Status: SHIPPED | OUTPATIENT
Start: 2018-10-04 | End: 2018-11-06

## 2018-10-04 RX ORDER — PREDNISONE 20 MG/1
TABLET ORAL
Qty: 14 TAB | Refills: 0 | Status: SHIPPED | OUTPATIENT
Start: 2018-10-04 | End: 2018-11-06

## 2018-10-04 NOTE — PROGRESS NOTES
Asmita Quach is a 62 y.o. female    Chief Complaint   Patient presents with    Cold Symptoms     x 2 days     Wheezing     x 2 days    Sleep Problem     Patient states she always feel sleepy (chronic)       1. Have you been to the ER, urgent care clinic since your last visit? Hospitalized since your last visit? Yes When: 07/2018 Where:  Reason for visit: Fall at work   M  2. Have you seen or consulted any other health care providers outside of the [address] [place] [address] since your last visit? Include any pap smears or colon screening.  No      Visit Vitals    /86 (BP 1 Location: Right arm, BP Patient Position: Sitting)    Pulse 89    Temp 97.7 °F (36.5 °C) (Oral)    Resp 18    Ht 5' 2\" (1.575 m)    SpO2 98%           Health Maintenance Due   Topic Date Due    Shingrix Vaccine Age 49> (1 of 2) 10/12/2010    Influenza Age 5 to Adult  08/01/2018

## 2018-10-04 NOTE — PROGRESS NOTES
HISTORY OF PRESENT ILLNESS  Ana Kerr is a 62 y.o. female. HPI  Pt received flu shot yesterday. Sinus Congestion: Pt reports significant wheeze, cough, and sore throat this morning. She c/o nasal drainage and fatigue for months. She notes that wheezing has improved today. She has been hydrating regularly. Pt reports pain in right elbow. Hypertension ROS: taking medications as instructed, no medication side effects noted, no TIA's, no chest pain on exertion, no dyspnea on exertion, no swelling of ankles. New concerns:  Patient's BP in office today is 133/86. She continues on HCTZ. Hyperlipidemia:  Cardiovascular risk analysis - 62 y.o. female LDL goal is under 130. ROS: taking medications as instructed, no medication side effects noted, no TIA's, no chest pain on exertion, no dyspnea on exertion, no swelling of ankles. Tolerating meds, no myalgias or other side effects noted  New concerns: Her last LDL was 85 on 8/18/17. Pt continues on Crestor. Insomnia: Stable, and well-managed with Amitriptyline. MCI: Stable, and well-managed with Aricept. Pt continues to f/u with Dr. Kae Momin (neurologist). Review of Systems   All other systems reviewed and are negative. Physical Exam   Constitutional: She is oriented to person, place, and time. She appears well-developed and well-nourished. HENT:   Head: Normocephalic and atraumatic. Right Ear: External ear normal.   Left Ear: External ear normal.   Nose: Nose normal.   Mouth/Throat: Oropharynx is clear and moist.   Eyes: Conjunctivae and EOM are normal.   Neck: Normal range of motion. Neck supple. Carotid bruit is not present. No thyroid mass and no thyromegaly present. Cardiovascular: Normal rate, regular rhythm, S1 normal, S2 normal, normal heart sounds and intact distal pulses. Pulmonary/Chest: Effort normal.   Abdominal: Soft. Normal appearance and bowel sounds are normal. There is no hepatosplenomegaly.  There is no tenderness. Musculoskeletal: Normal range of motion. Neurological: She is alert and oriented to person, place, and time. She has normal strength. No cranial nerve deficit or sensory deficit. Coordination normal.   Skin: Skin is warm, dry and intact. No abrasion and no rash noted. Psychiatric: She has a normal mood and affect. Her behavior is normal. Judgment and thought content normal.   Nursing note and vitals reviewed. ASSESSMENT and PLAN  Diagnoses and all orders for this visit:    1. Sinus congestion  Prescribed Augmentin. Will monitor for any changes or improvements. I suspect that aggravated symptoms may be side effects of flu shot. -     amoxicillin-clavulanate (AUGMENTIN) 875-125 mg per tablet; Take 1 Tab by mouth every twelve (12) hours. 2. Essential hypertension  BP is at goal. I do not recommend any change in medications. 3. Mild intermittent reactive airway disease with acute exacerbation  Prescribed Prednisone and Proair. Advised pt to take Claritin and Flonase. Will monitor for any changes or improvements. -     predniSONE (DELTASONE) 20 mg tablet; 2 every day for 4 days, 1 every day for 3 days, 1/2 every day for 3 days  -     albuterol (PROAIR HFA) 90 mcg/actuation inhaler; Take 1 Puff by inhalation every four (4) hours as needed for Wheezing or Shortness of Breath. 4. Dyslipidemia  Stable, patient tolerating meds, no myalgias. I do not recommend any change in medications. 5. Other insomnia  Stable, and well-managed. No change in medications. 6. BMI 36.0-36.9,adult  Stable condition. Encouraged pt to exercise regularly and monitor diet    7. MCI (mild cognitive impairment)  Stable, and well-managed. No change in medications. Pt will f/u with Dr. Herbie Clark soon. This note will not be viewable in [address] Lab results and schedule of future lab studies reviewed with patient. Reviewed diet, exercise and weight control.     Written by Elgin Knight, as dictated by Chandni Jennings MD.     Current diagnosis and concerns discussed with pt at length. Understands risks and benefits or current treatment plan and medications and accepts the treatment and medication with any possible risks. Pt asks appropriate questions which were answered. Pt instructed to call with any concerns or problems.

## 2018-10-25 RX ORDER — ESCITALOPRAM OXALATE 10 MG/1
10 TABLET ORAL DAILY
Qty: 90 TAB | Refills: 1 | Status: SHIPPED | OUTPATIENT
Start: 2018-10-25 | End: 2019-11-15

## 2018-11-06 ENCOUNTER — OFFICE VISIT (OUTPATIENT)
Dept: NEUROLOGY | Age: 58
End: 2018-11-06

## 2018-11-06 VITALS
RESPIRATION RATE: 18 BRPM | SYSTOLIC BLOOD PRESSURE: 144 MMHG | BODY MASS INDEX: 37.17 KG/M2 | DIASTOLIC BLOOD PRESSURE: 98 MMHG | WEIGHT: 202 LBS | OXYGEN SATURATION: 96 % | HEIGHT: 62 IN | HEART RATE: 100 BPM

## 2018-11-06 DIAGNOSIS — F33.0 DEPRESSION, MAJOR, RECURRENT, MILD (HCC): ICD-10-CM

## 2018-11-06 DIAGNOSIS — G43.009 MIGRAINE WITHOUT AURA AND WITHOUT STATUS MIGRAINOSUS, NOT INTRACTABLE: ICD-10-CM

## 2018-11-06 DIAGNOSIS — G31.84 MILD COGNITIVE IMPAIRMENT WITH MEMORY LOSS: Primary | ICD-10-CM
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
## 2018-11-06 NOTE — PROGRESS NOTES
Chief Complaint   Patient presents with    Memory Loss     Visit Vitals  BP (!) (P) 142/100   Pulse 100   Resp 18   Ht 5' 2\" (1.575 m)   Wt 91.6 kg (202 lb)   SpO2 96%   BMI 36.95 kg/m²     Visit Vitals  BP (!) 144/98   Pulse 100   Resp 18   Ht 5' 2\" (1.575 m)   Wt 91.6 kg (202 lb)   SpO2 96%   BMI 36.95 kg/m²     Patient informed to let PCP know that B/P is elevated. Dr. Grider Friend also aware.

## 2018-11-06 NOTE — PATIENT INSTRUCTIONS
Patient Instructions/Plans:  Please check your amitriptyline dosage  -If you are on 75mg tablets please cut this in half for three days the stop  -If you are on 10mg tablets please stop completely    If you develop migraines please call us back to try a different medication.

## 2018-11-06 NOTE — PROGRESS NOTES
Neurology Progress Note    Patient ID:  Walt Cohen  [id number]  62 y.o.  1960    HISTORY PROVIDED BY:  Patient    Chief Complaint: MCI  Subjective:    Ms. Fabian Combs is here for follow up today of MCI. We started her on lexapro 10mg daily and is doing well with this. She is on amitriptyline for migraines. Her dose was increased to 75mg after a fall. She is having increase fatigue, etc. She would like to go off of this medication. She is not 100% sure her correct dosage so she will confirm when she gets home. She is on aricept. No side effects from this. She feels that her memory is stable. Work is going well. She has not had any falls. Recap: Today she reports she is depressed. She fell coming out of work yesterday and almost got hit by a car. She went to the [facility] clinic. She didn't break any ribs but she is sore. She feels anxious all the time. She will have trouble keeping track of things at work. She is overwhelmed with her amount of her work. She used to be on lexapro and did well on it. She can't recall which dose. She previously failed Wellbutrin. She feels like she doesn't want to do anything but go home and sleep at night. She is tolerating the aricept fine with no issues. No new meds added. No new focal numbness or weakness. She denies suicidal ideation. Objective:   ROS:  Per HPI-  Otherwise 12 point ROS was negative    Meds:  Current Outpatient Medications on File Prior to Visit   Medication Sig Dispense Refill    escitalopram oxalate (LEXAPRO) 10 mg tablet Take 1 Tab by mouth daily. 90 Tab 1    predniSONE (DELTASONE) 20 mg tablet 2 every day for 4 days, 1 every day for 3 days, 1/2 every day for 3 days 14 Tab 0    amoxicillin-clavulanate (AUGMENTIN) 875-125 mg per tablet Take 1 Tab by mouth every twelve (12) hours.  20 Tab 0    albuterol (PROAIR HFA) 90 mcg/actuation inhaler Take 1 Puff by inhalation every four (4) hours as needed for Wheezing or Shortness of Breath. 1 Inhaler 2    rizatriptan (MAXALT-MLT) 10 mg disintegrating tablet DISSOLVE 1 TABLET BY MOUTH ONCE AS NEEDED FOR MIGRAINE FOR UP TO 1 DOSE 9 Tab 0    amitriptyline (ELAVIL) 10 mg tablet TAKE 1 TAB BY MOUTH NIGHTLY FOR 90 DAYS. 90 Tab 0    hydroCHLOROthiazide (HYDRODIURIL) 25 mg tablet TAKE 1 TAB BY MOUTH DAILY FOR 90 DAYS. 90 Tab 1    KLOR-CON 10 10 mEq tablet TAKE 1 TABLET BY MOUTH TWICE A  Tab 1    rosuvastatin (CRESTOR) 10 mg tablet TAKE 1 TAB BY MOUTH NIGHTLY. 90 Tab 1    cyclobenzaprine (FLEXERIL) 10 mg tablet Take 1 Tab by mouth three (3) times daily (with meals). 30 Tab 0    hydroCHLOROthiazide (HYDRODIURIL) 25 mg tablet TAKE 1 TAB BY MOUTH DAILY FOR 90 DAYS.  0    amitriptyline (ELAVIL) 75 mg tablet Take  by mouth.  Hydrochlorothiazide 100 % powd Take  by mouth.  donepezil (ARICEPT) 10 mg tablet Take 1 Tab by mouth nightly. 90 Tab 1    KLOR-CON 10 10 mEq tablet TAKE 1 TABLET BY MOUTH TWICE A DAY  0    vit a,c & e-lutein-minerals (OCUVITE) tablet daily.  b complex vitamins tablet Take 1 Tab by mouth as needed.  omeprazole (PRILOSEC OTC) 20 mg tablet Take 20 mg by mouth daily. (Patient taking differently: Take 20 mg by mouth as needed.) 90 Tab 1     No current facility-administered medications on file prior to visit. Imaging:  No new imaging    [facility] today    Lab Review   Results for orders placed or performed during the hospital encounter of 05/03/18   URINE CULTURE HOLD SAMPLE   Result Value Ref Range    Urine culture hold        URINE ON HOLD IN MICROBIOLOGY DEPT FOR 3 DAYS. IF UNPRESERVED URINE IS SUBMITTED, IT CANNOT BE USED FOR ADDITIONAL TESTING AFTER 24 HRS, RECOLLECTION WILL BE REQUIRED.    CBC WITH AUTOMATED DIFF   Result Value Ref Range    WBC 6.4 3.6 - 11.0 K/uL    RBC 5.55 (H) 3.80 - 5.20 M/uL    HGB 16.0 11.5 - 16.0 g/dL    HCT 47.7 (H) 35.0 - 47.0 %    MCV 85.9 80.0 - 99.0 FL    MCH 28.8 26.0 - 34.0 PG MCHC 33.5 30.0 - 36.5 g/dL    RDW 13.2 11.5 - 14.5 %    PLATELET 672 348 - 664 K/uL    MPV 10.5 8.9 - 12.9 FL    NRBC 0.0 0  WBC    ABSOLUTE NRBC 0.00 0.00 - 0.01 K/uL    NEUTROPHILS 67 32 - 75 %    LYMPHOCYTES 21 12 - 49 %    MONOCYTES 9 5 - 13 %    EOSINOPHILS 3 0 - 7 %    BASOPHILS 1 0 - 1 %    IMMATURE GRANULOCYTES 1 (H) 0.0 - 0.5 %    ABS. NEUTROPHILS 4.3 1.8 - 8.0 K/UL    ABS. LYMPHOCYTES 1.3 0.8 - 3.5 K/UL    ABS. MONOCYTES 0.6 0.0 - 1.0 K/UL    ABS. EOSINOPHILS 0.2 0.0 - 0.4 K/UL    ABS. BASOPHILS 0.0 0.0 - 0.1 K/UL    ABS. IMM. GRANS. 0.0 0.00 - 0.04 K/UL    DF AUTOMATED     LIPASE   Result Value Ref Range    Lipase 457 (H) 73 - 393 U/L   MAGNESIUM   Result Value Ref Range    Magnesium 2.1 1.6 - 2.4 mg/dL   METABOLIC PANEL, COMPREHENSIVE   Result Value Ref Range    Sodium 140 136 - 145 mmol/L    Potassium 3.8 3.5 - 5.1 mmol/L    Chloride 105 97 - 108 mmol/L    CO2 27 21 - 32 mmol/L    Anion gap 8 5 - 15 mmol/L    Glucose 121 (H) 65 - 100 mg/dL    BUN 11 6 - 20 MG/DL    Creatinine 0.89 0.55 - 1.02 MG/DL    BUN/Creatinine ratio 12 12 - 20      GFR est AA >60 >60 ml/min/1.73m2    GFR est non-AA >60 >60 ml/min/1.73m2    Calcium 9.5 8.5 - 10.1 MG/DL    Bilirubin, total 0.5 0.2 - 1.0 MG/DL    ALT (SGPT) 70 12 - 78 U/L    AST (SGOT) 33 15 - 37 U/L    Alk.  phosphatase 79 45 - 117 U/L    Protein, total 7.8 6.4 - 8.2 g/dL    Albumin 4.1 3.5 - 5.0 g/dL    Globulin 3.7 2.0 - 4.0 g/dL    A-G Ratio 1.1 1.1 - 2.2     URINALYSIS W/MICROSCOPIC   Result Value Ref Range    Color YELLOW/STRAW      Appearance CLEAR CLEAR      Specific gravity 1.010 1.003 - 1.030      pH (UA) 5.5 5.0 - 8.0      Protein NEGATIVE  NEG mg/dL    Glucose NEGATIVE  NEG mg/dL    Ketone NEGATIVE  NEG mg/dL    Bilirubin NEGATIVE  NEG      Blood NEGATIVE  NEG      Urobilinogen 0.2 0.2 - 1.0 EU/dL    Nitrites NEGATIVE  NEG      Leukocyte Esterase NEGATIVE  NEG      WBC 0-4 0 - 4 /hpf    RBC 0-5 0 - 5 /hpf    Epithelial cells FEW FEW /lpf    Bacteria NEGATIVE  NEG /hpf    Hyaline cast 0-2 0 - 5 /lpf       Exam:  Visit Vitals  Pulse 100   Resp 18   Ht 5' 2\" (1.575 m)   Wt 91.6 kg (202 lb)   SpO2 96%   BMI 36.95 kg/m²     Gen: Well developed  CV: RRR  Lungs: non labored breathing  Abd: non distending  Neuro: A&O x 3, no dysarthria or aphasia  CN II-XII: PERRL, EOMI, face symmetric, tongue/palate midline  Motor: strength 5/5 all four ext  Sensory: intact to LT  Gait: normal    Assessment:   Willian Arriola is a 62 y.o. female who presents for follow up of mild cognitive impairment. She is doing well on Aricept. Will continue this. Reversible labs including MRI and B12/TSH were negative. Her memory appears to be slightly improved now that she is on Lexapro. She is having some increase in fatigue which could be related to her increase in amitriptyline. Plan:   1. Continue Aricept 10 mg nightly. 2.  Wean off of amitriptyline. Patient advised to check her dosage and wean according to schedule given. She is to call us if she has increase in migraines. 3.  Continue Maxalt for abortive for migraine  4. Continue  lexapro 10mg daily. 5.  Encourage patient to remain physically, socially, and mentally active  6. Neuropsych as above. No need to repeat at this time    FU 6 months      Signed:  Bella Brody MD  11/6/2018    Medications and side effects discussed with patient in detail. With any new medications prescribed, patient was given instructions on administration and side effects. Written medication information was provided to the patient as well. This note was created using voice recognition software. Despite editing, there may be syntax errors. This note will not be viewable in [address]

## 2018-11-09 ENCOUNTER — TELEPHONE (OUTPATIENT)
Dept: INTERNAL MEDICINE CLINIC | Age: 58
End: 2018-11-09

## 2018-11-09 DIAGNOSIS — G43.919 INTRACTABLE MIGRAINE WITHOUT STATUS MIGRAINOSUS, UNSPECIFIED MIGRAINE TYPE: Chronic | ICD-10-CM

## 2018-11-09 RX ORDER — RIZATRIPTAN BENZOATE 10 MG/1
TABLET, ORALLY DISINTEGRATING ORAL
Qty: 9 TAB | Refills: 0 | Status: SHIPPED | OUTPATIENT
Start: 2018-11-09 | End: 2019-07-16 | Stop reason: SDUPTHER

## 2018-11-10 RX ORDER — AMITRIPTYLINE HYDROCHLORIDE 10 MG/1
TABLET, FILM COATED ORAL
Qty: 90 TAB | Refills: 0 | Status: SHIPPED | OUTPATIENT
Start: 2018-11-10 | End: 2019-02-09 | Stop reason: SDUPTHER

## 2018-11-12 NOTE — TELEPHONE ENCOUNTER
Contacted pharmacy who was able to run a 30 day supply. She will alert the pt of appt needed in office prior to 90 day fill.

## 2018-11-15 RX ORDER — HYDROCHLOROTHIAZIDE 25 MG/1
TABLET ORAL
Qty: 90 TAB | Refills: 1 | Status: SHIPPED | OUTPATIENT
Start: 2018-11-15 | End: 2019-04-26 | Stop reason: SDUPTHER

## 2018-11-16 ENCOUNTER — OFFICE VISIT (OUTPATIENT)
Dept: INTERNAL MEDICINE CLINIC | Age: 58
End: 2018-11-16

## 2018-11-16 VITALS
HEART RATE: 90 BPM | SYSTOLIC BLOOD PRESSURE: 128 MMHG | HEIGHT: 62 IN | TEMPERATURE: 98.1 F | BODY MASS INDEX: 36.95 KG/M2 | OXYGEN SATURATION: 93 % | DIASTOLIC BLOOD PRESSURE: 85 MMHG | RESPIRATION RATE: 14 BRPM

## 2018-11-16 DIAGNOSIS — G43.919 INTRACTABLE MIGRAINE WITHOUT STATUS MIGRAINOSUS, UNSPECIFIED MIGRAINE TYPE: Primary | ICD-10-CM

## 2018-11-16 DIAGNOSIS — E78.2 MIXED HYPERLIPIDEMIA: ICD-10-CM

## 2018-11-16 DIAGNOSIS — F41.9 ANXIETY: ICD-10-CM

## 2018-11-16 DIAGNOSIS — I10 ESSENTIAL HYPERTENSION: ICD-10-CM

## 2018-11-16 RX ORDER — BISMUTH SUBSALICYLATE 262 MG
1 TABLET,CHEWABLE ORAL DAILY
COMMUNITY
End: 2021-03-26

## 2018-11-16 NOTE — PROGRESS NOTES
HISTORY OF PRESENT ILLNESS  Wolf Guadarrama is a 62 y.o. female. HPI  Migraine: Pt took Aleve this morning for HA. She also c/o tension in neck and shoulders. She declines PT at this time. Hypertension ROS: taking medications as instructed, no medication side effects noted, no TIA's, no chest pain on exertion, no dyspnea on exertion, no swelling of ankles. New concerns:  Patient's BP in office today is 128/85. Hyperlipidemia:  Cardiovascular risk analysis - 62 y.o. female LDL goal is under 130. ROS: taking medications as instructed, no medication side effects noted, no TIA's, no chest pain on exertion, no dyspnea on exertion, no swelling of ankles. Tolerating meds, no myalgias or other side effects noted  New concerns: Her last LDL was 85 on 8/18/17. Pt continues on Crestor. MCI: Stable, and well-managed with Aricept. Pt continues to f/u with Dr. Cynthia Valdez (neurologist). Insomnia: Stable, and well-managed with lowered Amitriptyline dosage (10mg/day). Anxiety: Stable, and well-managed with Lexapro. Pt endorses stable bowels and urination. Review of Systems   All other systems reviewed and are negative. Physical Exam   Constitutional: She is oriented to person, place, and time. She appears well-developed and well-nourished. HENT:   Head: Normocephalic and atraumatic. Right Ear: External ear normal.   Left Ear: External ear normal.   Nose: Nose normal.   Mouth/Throat: Oropharynx is clear and moist.   Eyes: Conjunctivae and EOM are normal.   Neck: Normal range of motion. Neck supple. Carotid bruit is not present. No thyroid mass and no thyromegaly present. Cardiovascular: Normal rate, regular rhythm, S1 normal, S2 normal, normal heart sounds and intact distal pulses. Pulmonary/Chest: Effort normal and breath sounds normal.   Abdominal: Soft. Normal appearance and bowel sounds are normal. There is no hepatosplenomegaly. There is no tenderness.    Musculoskeletal: Normal range of motion. Neurological: She is alert and oriented to person, place, and time. She has normal strength. No cranial nerve deficit or sensory deficit. Coordination normal.   Skin: Skin is warm, dry and intact. No abrasion and no rash noted. Psychiatric: She has a normal mood and affect. Her behavior is normal. Judgment and thought content normal.   Nursing note and vitals reviewed. ASSESSMENT and PLAN  Diagnoses and all orders for this visit:    1. Intractable migraine without status migrainosus, unspecified migraine type  Stable, and well-managed. No change in medications. Encouraged pt to exercise more regularly (core exercises) to manage symptoms. Pt plans to walk more regularly outdoors. 2. Essential hypertension  BP is at goal. I do not recommend any change in medications.  -     METABOLIC PANEL, COMPREHENSIVE    3. Mixed hyperlipidemia  Stable, patient tolerating meds, no myalgias. I do not recommend any change in medications.  -     LIPID PANEL    4. Anxiety  Stable, and doing well. Continue current medications. This note will not be viewable in [address] Lab results and schedule of future lab studies reviewed with patient. Reviewed diet, exercise and weight control. Written by Ida Garcia, as dictated by Benedict Kawasaki, MD.     Current diagnosis and concerns discussed with pt at length. Understands risks and benefits or current treatment plan and medications and accepts the treatment and medication with any possible risks. Pt asks appropriate questions which were answered. Pt instructed to call with any concerns or problems.

## 2018-11-17 LAB
ALBUMIN SERPL-MCNC: 4.6 G/DL (ref 3.5–5.5)
ALBUMIN/GLOB SERPL: 1.9 {RATIO} (ref 1.2–2.2)
ALP SERPL-CCNC: 76 IU/L (ref 39–117)
ALT SERPL-CCNC: 46 IU/L (ref 0–32)
AST SERPL-CCNC: 31 IU/L (ref 0–40)
BILIRUB SERPL-MCNC: 0.6 MG/DL (ref 0–1.2)
BUN SERPL-MCNC: 18 MG/DL (ref 6–24)
BUN/CREAT SERPL: 19 (ref 9–23)
CALCIUM SERPL-MCNC: 9.5 MG/DL (ref 8.7–10.2)
CHLORIDE SERPL-SCNC: 98 MMOL/L (ref 96–106)
CHOLEST SERPL-MCNC: 186 MG/DL (ref 100–199)
CO2 SERPL-SCNC: 25 MMOL/L (ref 20–29)
CREAT SERPL-MCNC: 0.95 MG/DL (ref 0.57–1)
GLOBULIN SER CALC-MCNC: 2.4 G/DL (ref 1.5–4.5)
GLUCOSE SERPL-MCNC: 109 MG/DL (ref 65–99)
HDLC SERPL-MCNC: 44 MG/DL
INTERPRETATION, 910389: NORMAL
LDLC SERPL CALC-MCNC: 95 MG/DL (ref 0–99)
POTASSIUM SERPL-SCNC: 3.7 MMOL/L (ref 3.5–5.2)
PROT SERPL-MCNC: 7 G/DL (ref 6–8.5)
SODIUM SERPL-SCNC: 139 MMOL/L (ref 134–144)
TRIGL SERPL-MCNC: 233 MG/DL (ref 0–149)
VLDLC SERPL CALC-MCNC: 47 MG/DL (ref 5–40)

## 2018-11-26 RX ORDER — DONEPEZIL HYDROCHLORIDE 10 MG/1
10 TABLET, FILM COATED ORAL
Qty: 90 TAB | Refills: 1 | Status: SHIPPED | OUTPATIENT
Start: 2018-11-26 | End: 2019-08-30 | Stop reason: SDUPTHER

## 2019-01-09 RX ORDER — ROSUVASTATIN CALCIUM 10 MG/1
TABLET, COATED ORAL
Qty: 90 TAB | Refills: 1 | Status: SHIPPED | OUTPATIENT
Start: 2019-01-09 | End: 2019-07-10 | Stop reason: SDUPTHER

## 2019-02-09 RX ORDER — AMITRIPTYLINE HYDROCHLORIDE 10 MG/1
TABLET, FILM COATED ORAL
Qty: 90 TAB | Refills: 0 | Status: SHIPPED | OUTPATIENT
Start: 2019-02-09 | End: 2019-05-09 | Stop reason: SDUPTHER

## 2019-02-15 ENCOUNTER — HOSPITAL ENCOUNTER (OUTPATIENT)
Dept: MAMMOGRAPHY | Age: 59
Discharge: HOME | End: 2019-02-15
Attending: INTERNAL MEDICINE
Payer: COMMERCIAL

## 2019-02-15 DIAGNOSIS — Z12.39 SCREENING BREAST EXAMINATION: ICD-10-CM

## 2019-02-15 PROCEDURE — 77063 BREAST TOMOSYNTHESIS BI: CPT

## 2019-04-26 NOTE — TELEPHONE ENCOUNTER
Eleazar Noel [facility] Front Office Pool             Pt is requesting that HCTZ unsure mg to be called into the [facility]. Pt would like to schedule an appt today. Due to being out of blood pressure medication.   Best Contact: [phone]      [id number]- [place], [place] - [address]  AT [facility]  [phone]

## 2019-04-29 RX ORDER — HYDROCHLOROTHIAZIDE 25 MG/1
TABLET ORAL
Qty: 30 TAB | Refills: 0 | Status: SHIPPED | OUTPATIENT
Start: 2019-04-29 | End: 2019-07-04 | Stop reason: SDUPTHER

## 2019-07-04 RX ORDER — HYDROCHLOROTHIAZIDE 25 MG/1
TABLET ORAL
Qty: 30 TAB | Refills: 0 | Status: SHIPPED | OUTPATIENT
Start: 2019-07-04 | End: 2019-07-27 | Stop reason: SDUPTHER

## 2019-07-10 RX ORDER — ROSUVASTATIN CALCIUM 10 MG/1
TABLET, COATED ORAL
Qty: 90 TAB | Refills: 0 | Status: SHIPPED | OUTPATIENT
Start: 2019-07-10 | End: 2019-11-09 | Stop reason: SDUPTHER

## 2019-07-15 ENCOUNTER — TELEPHONE (OUTPATIENT)
Dept: NEUROLOGY | Age: 59
End: 2019-07-15

## 2019-07-15 NOTE — TELEPHONE ENCOUNTER
----- Message from Jody Mai sent at 7/15/2019  2:34 PM EDT -----  Regarding: NP Andrea/Refill  Pt request a refill of her \"Maxalt\" medication to be sent to her [facility] on file. Best contact number is [phone].

## 2019-07-16 DIAGNOSIS — G43.919 INTRACTABLE MIGRAINE WITHOUT STATUS MIGRAINOSUS, UNSPECIFIED MIGRAINE TYPE: Chronic | ICD-10-CM

## 2019-07-16 RX ORDER — RIZATRIPTAN BENZOATE 10 MG/1
TABLET, ORALLY DISINTEGRATING ORAL
Qty: 9 TAB | Refills: 5 | Status: SHIPPED | OUTPATIENT
Start: 2019-07-16 | End: 2020-05-15

## 2019-07-27 ENCOUNTER — TELEPHONE (OUTPATIENT)
Dept: INTERNAL MEDICINE CLINIC | Age: 59
End: 2019-07-27

## 2019-07-27 RX ORDER — HYDROCHLOROTHIAZIDE 25 MG/1
TABLET ORAL
Qty: 30 TAB | Refills: 0 | Status: SHIPPED | OUTPATIENT
Start: 2019-07-27 | End: 2019-08-20 | Stop reason: SDUPTHER

## 2019-08-20 RX ORDER — HYDROCHLOROTHIAZIDE 25 MG/1
TABLET ORAL
Qty: 30 TAB | Refills: 0 | Status: SHIPPED | OUTPATIENT
Start: 2019-08-20 | End: 2019-09-20 | Stop reason: SDUPTHER

## 2019-08-30 ENCOUNTER — OFFICE VISIT (OUTPATIENT)
Dept: NEUROLOGY | Age: 59
End: 2019-08-30

## 2019-08-30 VITALS
DIASTOLIC BLOOD PRESSURE: 96 MMHG | BODY MASS INDEX: 37.17 KG/M2 | OXYGEN SATURATION: 92 % | SYSTOLIC BLOOD PRESSURE: 140 MMHG | WEIGHT: 202 LBS | HEIGHT: 62 IN | HEART RATE: 123 BPM

## 2019-08-30 DIAGNOSIS — G47.00 INSOMNIA, UNSPECIFIED TYPE: ICD-10-CM

## 2019-08-30 DIAGNOSIS — G43.919 INTRACTABLE MIGRAINE WITHOUT STATUS MIGRAINOSUS, UNSPECIFIED MIGRAINE TYPE: Primary | ICD-10-CM

## 2019-08-30 RX ORDER — DONEPEZIL HYDROCHLORIDE 10 MG/1
10 TABLET, FILM COATED ORAL
Qty: 90 TAB | Refills: 1 | Status: SHIPPED | OUTPATIENT
Start: 2019-08-30 | End: 2020-03-09

## 2019-08-30 RX ORDER — ESZOPICLONE 2 MG/1
2 TABLET, FILM COATED ORAL
Qty: 30 TAB | Refills: 5 | Status: SHIPPED | OUTPATIENT
Start: 2019-08-30 | End: 2019-11-15

## 2019-08-30 NOTE — PROGRESS NOTES
Elavil, aricept, klor con 10, maxalt needs refills. Memory has been worse lately. Not sleeping. Went to sleep clinic and they told her when she closes her eyes her brain is more active.

## 2019-09-03 NOTE — PROGRESS NOTES
Terrie Valdez is a 62 y.o. female who presents with the following  Chief Complaint   Patient presents with    Memory Loss       HPI       Patient normally with Dr. Noris Luke comes in for a follow up. She is having migraines about 20 a month lasting about 8 hours each and has a hx of MCI. She states her migraines are getting worse. She has headaches the other days. She is on Elavil, Lexapro. Has tried Topamax, Gabapentin. She is on amitriptyline for migraines. Her dose was increased to 75mg after a fall. She is on aricept. No side effects from this. She feels that her memory is stable but may be getting worse recently because she is not sleeping very well. 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 She feels her memory is up and down. Worse with no sleep. Failed OTC medications. She feels like mostly things are worse due to sleep. Recap from previus notes. She fell coming out of work yesterday and almost got hit by a car. She went to the [facility] clinic. She didn't break any ribs but she is sore. She feels anxious all the time. She will have trouble keeping track of things at work. She is overwhelmed with her amount of her work. She used to be on lexapro and did well on it. She can't recall which dose. She previously failed Wellbutrin. She feels like she doesn't want to do anything but go home and sleep at night. She is tolerating the aricept fine with no issues. No new meds added. No new focal numbness or weakness. She denies suicidal ideation.                      Allergies   Allergen Reactions    Flu Vaccine [id number](36mo,Up) Anaphylaxis    Erythromycin Nausea and Vomiting    Lisinopril Other (comments)     \"margoth achy\"       Current Outpatient Medications   Medication Sig    erenumab-aooe (AIMOVIG AUTOINJECTOR) 70 mg/mL injection 1 mL by SubCUTAneous route every thirty (30) days.  eszopiclone (LUNESTA) 2 mg tablet Take 1 Tab by mouth nightly.  Max Daily Amount: 2 mg.  donepezil (ARICEPT) 10 mg tablet Take 1 Tab by mouth nightly.  hydroCHLOROthiazide (HYDRODIURIL) 25 mg tablet TAKE 1 TABLET BY MOUTH EVERY DAY    rizatriptan (MAXALT-MLT) 10 mg disintegrating tablet DISSOLVE 1 TABLET BY MOUTH ONCE AS NEEDED FOR MIGRAINE FOR UP TO 1 DOSE    rosuvastatin (CRESTOR) 10 mg tablet TAKE 1 TAB BY MOUTH NIGHTLY.  amitriptyline (ELAVIL) 10 mg tablet TAKE 1 TAB BY MOUTH NIGHTLY FOR 90 DAYS.  KLOR-CON 10 10 mEq tablet TAKE 1 TABLET BY MOUTH TWICE A DAY    vit a,c & e-lutein-minerals (OCUVITE) tablet daily.  b complex vitamins tablet Take 1 Tab by mouth as needed.  omeprazole (PRILOSEC OTC) 20 mg tablet Take 20 mg by mouth daily. (Patient taking differently: Take 20 mg by mouth as needed.)    multivitamin (ONE A DAY) tablet Take 1 Tab by mouth daily.  escitalopram oxalate (LEXAPRO) 10 mg tablet Take 1 Tab by mouth daily.  amitriptyline (ELAVIL) 75 mg tablet Take  by mouth.  Hydrochlorothiazide 100 % powd Take  by mouth. No current facility-administered medications for this visit.         Social History     Tobacco Use   Smoking Status Never Smoker   Smokeless Tobacco Never Used       Past Medical History:   Diagnosis Date    Anxiety     Desmoid fibromatosis     chest    Dysfunctional uterine bleeding 8/17/2011    GERD (gastroesophageal reflux disease)     Hyperlipemia     Hypertension     Insomnia     severe, sleep study normal 2011    Migraine 2/24/2012       Past Surgical History:   Procedure Laterality Date    CHEST SURGERY PROCEDURE UNLISTED      desmoid tumor    ENDOSCOPY, COLON, DIAGNOSTIC  5/2010    HX BREAST BIOPSY Right     benign    HX GYN      d and c    HX OTHER SURGICAL      tumor removal in chest removed       Family History   Problem Relation Age of Onset    Thyroid Disease Mother         goiter    Hypertension Father     Stroke Father     Thyroid Disease Sister         nodules    Hypertension Sister     Cancer Maternal Grandmother         polycythemia    Cancer Paternal Grandmother         breast    Breast Cancer Paternal Grandmother     Cancer Paternal Grandfather         lung cancer    Other Sister         lyme disease    No Known Problems Sister        Social History     Socioeconomic History    Marital status:      Spouse name: Not on file    Number of children: Not on file    Years of education: Not on file    Highest education level: Not on file   Tobacco Use    Smoking status: Never Smoker    Smokeless tobacco: Never Used   Substance and Sexual Activity    Alcohol use: Yes     Comment: rarely    Drug use: No    Sexual activity: Yes     Partners: Male       Review of Systems   Eyes: Positive for blurred vision and photophobia. Negative for double vision. Respiratory: Negative for shortness of breath and wheezing. Gastrointestinal: Negative for vomiting. Neurological: Positive for dizziness and headaches. Psychiatric/Behavioral: Positive for memory loss. Negative for depression, hallucinations, substance abuse and suicidal ideas. The patient has insomnia. The patient is not nervous/anxious. Remainder of comprehensive review is negative. Physical Exam :    Visit Vitals  BP (!) 140/96   Pulse (!) 123   Ht 5' 2\" (1.575 m)   Wt 91.6 kg (202 lb)   LMP 11/07/2010   SpO2 92%   BMI 36.95 kg/m²       General: Well defined, nourished, and groomed individual in no acute distress.    Neck: Supple, nontender, no bruits, no pain with resistance to active range of motion.    Heart: Regular rate and rhythm, no murmurs, rub, or gallop. Normal S1S2.   Lungs: Clear to auscultation bilaterally with equal chest expansion, no cough, no wheeze  Musculoskeletal: Extremities revealed no edema and had full range of motion of joints.    Psych: Good mood and bright affect    NEUROLOGICAL EXAMINATION:    Mental Status: Alert and oriented to person, place, and time    Cranial Nerves:    II, III, IV, VI: Visual acuity grossly intact. Visual fields are normal.    Pupils are equal, round, and reactive to light and accommodation.    Extra-ocular movements are full and fluid. Fundoscopic exam was benign, no ptosis or nystagmus.    V-XII: Hearing is grossly intact. Facial features are symmetric, with normal sensation and strength. The palate rises symmetrically and the tongue protrudes midline. Sternocleidomastoids 5/5. Motor Examination: Normal tone, bulk, and strength, 5/5 muscle strength throughout. Coordination: Finger to nose was normal. No resting or intention tremor    Gait and Station: Steady while walking. Normal arm swing. No pronator drift. No muscle wasting or fasiculations noted. Reflexes: DTRs 2+ throughout. Results for orders placed or performed in visit on 45/15/77   METABOLIC PANEL, COMPREHENSIVE   Result Value Ref Range    Glucose 109 (H) 65 - 99 mg/dL    BUN 18 6 - 24 mg/dL    Creatinine 0.95 0.57 - 1.00 mg/dL    GFR est non-AA 66 >59 mL/min/1.73    GFR est AA 76 >59 mL/min/1.73    BUN/Creatinine ratio 19 9 - 23    Sodium 139 134 - 144 mmol/L    Potassium 3.7 3.5 - 5.2 mmol/L    Chloride 98 96 - 106 mmol/L    CO2 25 20 - 29 mmol/L    Calcium 9.5 8.7 - 10.2 mg/dL    Protein, total 7.0 6.0 - 8.5 g/dL    Albumin 4.6 3.5 - 5.5 g/dL    GLOBULIN, TOTAL 2.4 1.5 - 4.5 g/dL    A-G Ratio 1.9 1.2 - 2.2    Bilirubin, total 0.6 0.0 - 1.2 mg/dL    Alk. phosphatase 76 39 - 117 IU/L    AST (SGOT) 31 0 - 40 IU/L    ALT (SGPT) 46 (H) 0 - 32 IU/L   LIPID PANEL   Result Value Ref Range    Cholesterol, total 186 100 - 199 mg/dL    Triglyceride 233 (H) 0 - 149 mg/dL    HDL Cholesterol 44 >39 mg/dL    VLDL, calculated 47 (H) 5 - 40 mg/dL    LDL, calculated 95 0 - 99 mg/dL   CVD REPORT   Result Value Ref Range    INTERPRETATION Note        Orders Placed This Encounter    erenumab-aooe (AIMOVIG AUTOINJECTOR) 70 mg/mL injection     Si mL by SubCUTAneous route every thirty (30) days.      Dispense: 1 Each     Refill:  5    eszopiclone (LUNESTA) 2 mg tablet     Sig: Take 1 Tab by mouth nightly. Max Daily Amount: 2 mg. Dispense:  30 Tab     Refill:  5    donepezil (ARICEPT) 10 mg tablet     Sig: Take 1 Tab by mouth nightly. Dispense:  90 Tab     Refill:  1       1. Intractable migraine without status migrainosus, unspecified migraine type    2. Insomnia, unspecified type            Migraines. Initiate Aimovig 70 mg every 30 days to help as FDA indicated and approved migraine prevention. Discussed and demo in office with no concerns. Failed SSRI, AED and can not take any beta blockers due to hypotension. Track HA. Keep Aricept 10 mg daily for MCI. Discussed sleep. Try Lunesta as sleep aid. Discussed side effects and she has no questions. Stay active. Keep working.    [employer]         This note will not be viewable in [facility]

## 2019-11-09 RX ORDER — ROSUVASTATIN CALCIUM 10 MG/1
TABLET, COATED ORAL
Qty: 90 TAB | Refills: 0 | Status: SHIPPED | OUTPATIENT
Start: 2019-11-09 | End: 2020-01-31

## 2019-11-15 ENCOUNTER — HOSPITAL ENCOUNTER (OUTPATIENT)
Dept: LAB | Age: 59
Discharge: HOME | End: 2019-11-15

## 2019-11-15 ENCOUNTER — HOSPITAL ENCOUNTER (OUTPATIENT)
Dept: LAB | Age: 59
Discharge: HOME | End: 2019-11-15
Payer: COMMERCIAL

## 2019-11-15 ENCOUNTER — OFFICE VISIT (OUTPATIENT)
Dept: INTERNAL MEDICINE CLINIC | Age: 59
End: 2019-11-15

## 2019-11-15 VITALS
HEIGHT: 62 IN | DIASTOLIC BLOOD PRESSURE: 92 MMHG | OXYGEN SATURATION: 98 % | RESPIRATION RATE: 16 BRPM | SYSTOLIC BLOOD PRESSURE: 160 MMHG | BODY MASS INDEX: 37.54 KG/M2 | HEART RATE: 89 BPM | TEMPERATURE: 97.3 F | WEIGHT: 204 LBS

## 2019-11-15 DIAGNOSIS — I10 ESSENTIAL HYPERTENSION: ICD-10-CM

## 2019-11-15 DIAGNOSIS — Z00.00 ROUTINE GENERAL MEDICAL EXAMINATION AT A HEALTH CARE FACILITY: ICD-10-CM

## 2019-11-15 DIAGNOSIS — Z12.4 ROUTINE PAPANICOLAOU SMEAR: ICD-10-CM

## 2019-11-15 DIAGNOSIS — E78.2 MIXED HYPERLIPIDEMIA: ICD-10-CM

## 2019-11-15 DIAGNOSIS — Z12.11 SCREENING FOR COLON CANCER: ICD-10-CM

## 2019-11-15 DIAGNOSIS — Z12.4 ROUTINE PAPANICOLAOU SMEAR: Primary | ICD-10-CM

## 2019-11-15 LAB
ALBUMIN SERPL-MCNC: 4.1 G/DL (ref 3.5–5)
ALBUMIN/GLOB SERPL: 1.2 {RATIO} (ref 1.1–2.2)
ALP SERPL-CCNC: 78 U/L (ref 45–117)
ALT SERPL-CCNC: 67 U/L (ref 12–78)
ANION GAP SERPL CALC-SCNC: 7 MMOL/L (ref 5–15)
AST SERPL-CCNC: 28 U/L (ref 15–37)
BILIRUB SERPL-MCNC: 0.6 MG/DL (ref 0.2–1)
BUN SERPL-MCNC: 12 MG/DL (ref 6–20)
BUN/CREAT SERPL: 14 (ref 12–20)
CALCIUM SERPL-MCNC: 9.4 MG/DL (ref 8.5–10.1)
CHLORIDE SERPL-SCNC: 109 MMOL/L (ref 97–108)
CHOLEST SERPL-MCNC: 216 MG/DL
CO2 SERPL-SCNC: 26 MMOL/L (ref 21–32)
CREAT SERPL-MCNC: 0.85 MG/DL (ref 0.55–1.02)
ERYTHROCYTE [DISTWIDTH] IN BLOOD BY AUTOMATED COUNT: 13.5 % (ref 11.5–14.5)
GLOBULIN SER CALC-MCNC: 3.3 G/DL (ref 2–4)
GLUCOSE SERPL-MCNC: 89 MG/DL (ref 65–100)
HCT VFR BLD AUTO: 49 % (ref 35–47)
HDLC SERPL-MCNC: 40 MG/DL
HDLC SERPL: 5.4 {RATIO} (ref 0–5)
HGB BLD-MCNC: 15.5 G/DL (ref 11.5–16)
LDLC SERPL CALC-MCNC: 134.4 MG/DL (ref 0–100)
LIPID PROFILE,FLP: ABNORMAL
MCH RBC QN AUTO: 28.6 PG (ref 26–34)
MCHC RBC AUTO-ENTMCNC: 31.6 G/DL (ref 30–36.5)
MCV RBC AUTO: 90.4 FL (ref 80–99)
NRBC # BLD: 0 K/UL (ref 0–0.01)
NRBC BLD-RTO: 0 PER 100 WBC
PLATELET # BLD AUTO: 251 K/UL (ref 150–400)
PMV BLD AUTO: 11 FL (ref 8.9–12.9)
POTASSIUM SERPL-SCNC: 4.6 MMOL/L (ref 3.5–5.1)
PROT SERPL-MCNC: 7.4 G/DL (ref 6.4–8.2)
RBC # BLD AUTO: 5.42 M/UL (ref 3.8–5.2)
SODIUM SERPL-SCNC: 142 MMOL/L (ref 136–145)
TRIGL SERPL-MCNC: 208 MG/DL (ref ?–150)
TSH SERPL DL<=0.05 MIU/L-ACNC: 1.49 UIU/ML (ref 0.36–3.74)
VLDLC SERPL CALC-MCNC: 41.6 MG/DL
WBC # BLD AUTO: 6.9 K/UL (ref 3.6–11)

## 2019-11-15 PROCEDURE — 88175 CYTOPATH C/V AUTO FLUID REDO: CPT

## 2019-11-15 PROCEDURE — 87624 HPV HI-RISK TYP POOLED RSLT: CPT

## 2019-11-15 RX ORDER — LOSARTAN POTASSIUM 50 MG/1
50 TABLET ORAL DAILY
Qty: 30 TAB | Refills: 0 | Status: SHIPPED | OUTPATIENT
Start: 2019-11-15 | End: 2019-12-09 | Stop reason: SDUPTHER

## 2019-11-15 NOTE — PROGRESS NOTES
HISTORY OF PRESENT ILLNESS  Desiree Whipple is a 61 y.o. female. HPI   Subjective:   Desiree Whipple is a 61 y.o. female with hypertension. Hypertension ROS: taking medications as instructed, no medication side effects noted, no TIA's, no chest pain on exertion, no dyspnea on exertion, no swelling of ankles. New concerns: has been running higher lately- she has noticed, she also has fatigue     Subjective:   Desiree Whipple is a 61 y.o. female with hyperlipidemia. Cardiovascular risk analysis - 61 y.o. female LDL goal is under 130. ROS: taking medications as instructed, no medication side effects noted, no TIA's, no chest pain on exertion, no dyspnea on exertion, no swelling of ankles. Tolerating meds, no myalgias or other side effects noted  New concerns: tolerating meds . She is seeing neurologist for short term memory loss- taking the Uli Monas is working and the aricept and is stable     She is seeing neurologist who is giving her headache and memory medicine- she needs to be exercising! !    .         Review of Systems   Constitutional: Negative for chills, diaphoresis, fever, malaise/fatigue and weight loss. HENT: Negative for congestion, ear pain, hearing loss, sore throat and tinnitus. Eyes: Negative for blurred vision and double vision. Respiratory: Negative for cough, hemoptysis, sputum production, shortness of breath and wheezing. Cardiovascular: Negative for chest pain, palpitations, orthopnea, claudication, leg swelling and PND. Gastrointestinal: Negative for abdominal pain, blood in stool, constipation, diarrhea, heartburn, nausea and vomiting. Genitourinary: Negative for dysuria, flank pain, frequency, hematuria and urgency. Musculoskeletal: Negative for back pain, joint pain, myalgias and neck pain. Skin: Negative. Neurological: Negative for dizziness, tingling, sensory change, speech change, focal weakness, seizures, loss of consciousness, weakness and headaches. Endo/Heme/Allergies: Negative for environmental allergies and polydipsia. Does not bruise/bleed easily. Psychiatric/Behavioral: Negative for depression, memory loss, substance abuse and suicidal ideas. The patient is not nervous/anxious and does not have insomnia. Physical Exam   Constitutional: She is oriented to person, place, and time. She appears well-developed and well-nourished. HENT:   Head: Normocephalic and atraumatic. Right Ear: External ear normal.   Left Ear: External ear normal.   Nose: Nose normal.   Mouth/Throat: Oropharynx is clear and moist.   Eyes: Pupils are equal, round, and reactive to light. Conjunctivae and EOM are normal.   Neck: Normal range of motion. Neck supple. Cardiovascular: Normal rate, regular rhythm, normal heart sounds and intact distal pulses. Pulmonary/Chest: Effort normal and breath sounds normal. Right breast exhibits no inverted nipple, no mass, no nipple discharge, no skin change and no tenderness. Left breast exhibits no inverted nipple, no mass, no nipple discharge, no skin change and no tenderness. No breast swelling, tenderness, discharge or bleeding. Breasts are symmetrical.   Abdominal: Soft. Bowel sounds are normal.   Genitourinary: Rectum normal, vagina normal and uterus normal. Rectal exam shows anal tone normal and guaiac negative stool. No breast swelling, tenderness, discharge or bleeding. No vaginal discharge found. Musculoskeletal: Normal range of motion. Neurological: She is alert and oriented to person, place, and time. Skin: Skin is warm and dry. Psychiatric: She has a normal mood and affect. Her behavior is normal. Judgment and thought content normal.   Nursing note and vitals reviewed. ASSESSMENT and PLAN  Diagnoses and all orders for this visit:    1. Routine Papanicolaou smear    2.  Essential hypertension- no at goal- will hold off potassium for now as she is going on losartan and we can decide from when we see her back in a month next options   -     CBC W/O DIFF; Future  -     losartan (COZAAR) 50 mg tablet; Take 1 Tab by mouth daily. 3. Mixed hyperlipidemia  -     LIPID PANEL; Future  -     METABOLIC PANEL, COMPREHENSIVE; Future    4. Routine general medical examination at a health care facility  -     TSH 3RD GENERATION; Future    5. Screening for colon cancer  -     REFERRAL TO GASTROENTEROLOGY  This note will not be viewable in [address]           lab results and schedule of future lab studies reviewed with patient  reviewed diet, exercise and weight control  cardiovascular risk and specific lipid/LDL goals reviewed  reviewed medications and side effects in detail

## 2019-11-22 ENCOUNTER — OFFICE VISIT (OUTPATIENT)
Dept: NEUROLOGY | Age: 59
End: 2019-11-22

## 2019-11-22 VITALS
DIASTOLIC BLOOD PRESSURE: 88 MMHG | SYSTOLIC BLOOD PRESSURE: 128 MMHG | HEART RATE: 106 BPM | OXYGEN SATURATION: 95 % | BODY MASS INDEX: 37.54 KG/M2 | HEIGHT: 62 IN | WEIGHT: 204 LBS

## 2019-11-22 DIAGNOSIS — F90.0 ATTENTION DEFICIT HYPERACTIVITY DISORDER (ADHD), PREDOMINANTLY INATTENTIVE TYPE: Primary | ICD-10-CM

## 2019-11-22 DIAGNOSIS — G31.84 MCI (MILD COGNITIVE IMPAIRMENT): ICD-10-CM

## 2019-11-25 NOTE — PROGRESS NOTES
Pearl Tran is a 61 y.o. female who presents with the following  Chief Complaint   Patient presents with    Follow-up     Memory Loss       HPI     Patient comes in for a follow up for memory, migraines. She was started on Aimovig and has not had any migraines since starting this. She feels this is working great. She was having migraines about 20 a month lasting about 8 hours each before adding this. She is on Elavil, Lexapro. Has tried Topamax, Gabapentin. She is on amitriptyline for migraines. Her dose was increased to 75mg after a fall last year. She is on aricept. No side effects from this.    She feels that her memory is stable but may be getting worse recently because she is not sleeping very well. 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 She feels her memory is up and down. Worse with no sleep. Failed OTC medications.           Memory she feels like is getting worse. Interested in trying some medication for attention.                        Allergies   Allergen Reactions    Flu Vaccine [id number](36mo,Up) Anaphylaxis    Erythromycin Nausea and Vomiting    Lisinopril Other (comments)     \"margoth achy\"       Current Outpatient Medications   Medication Sig    lisdexamfetamine (VYVANSE) 10 mg capsule Take 1 Cap by mouth daily. Max Daily Amount: 10 mg.    losartan (COZAAR) 50 mg tablet Take 1 Tab by mouth daily.  rosuvastatin (CRESTOR) 10 mg tablet TAKE 1 TAB BY MOUTH NIGHTLY.  hydroCHLOROthiazide (HYDRODIURIL) 25 mg tablet TAKE 1 TABLET BY MOUTH EVERY DAY    erenumab-aooe (AIMOVIG AUTOINJECTOR) 70 mg/mL injection 1 mL by SubCUTAneous route every thirty (30) days.  donepezil (ARICEPT) 10 mg tablet Take 1 Tab by mouth nightly.  rizatriptan (MAXALT-MLT) 10 mg disintegrating tablet DISSOLVE 1 TABLET BY MOUTH ONCE AS NEEDED FOR MIGRAINE FOR UP TO 1 DOSE    multivitamin (ONE A DAY) tablet Take 1 Tab by mouth daily.     vit a,c & e-lutein-minerals (OCUVITE) tablet daily.  b complex vitamins tablet Take 1 Tab by mouth as needed.  omeprazole (PRILOSEC OTC) 20 mg tablet Take 20 mg by mouth daily. (Patient taking differently: Take 20 mg by mouth as needed.)    amitriptyline (ELAVIL) 75 mg tablet Take  by mouth.  Hydrochlorothiazide 100 % powd Take  by mouth. No current facility-administered medications for this visit.         Social History     Tobacco Use   Smoking Status Never Smoker   Smokeless Tobacco Never Used       Past Medical History:   Diagnosis Date    Anxiety     Desmoid fibromatosis     chest    Dysfunctional uterine bleeding 8/17/2011    GERD (gastroesophageal reflux disease)     Hyperlipemia     Hypertension     Insomnia     severe, sleep study normal 2011    Migraine 2/24/2012       Past Surgical History:   Procedure Laterality Date    CHEST SURGERY PROCEDURE UNLISTED      desmoid tumor    ENDOSCOPY, COLON, DIAGNOSTIC  5/2010    HX BREAST BIOPSY Right     benign    HX GYN      d and c    HX OTHER SURGICAL      tumor removal in chest removed       Family History   Problem Relation Age of Onset    Thyroid Disease Mother         goiter    Hypertension Father     Stroke Father     Thyroid Disease Sister         nodules    Hypertension Sister     Cancer Maternal Grandmother         polycythemia    Cancer Paternal Grandmother         breast    Breast Cancer Paternal Grandmother     Cancer Paternal Grandfather         lung cancer    Other Sister         lyme disease    No Known Problems Sister        Social History     Socioeconomic History    Marital status:      Spouse name: Not on file    Number of children: Not on file    Years of education: Not on file    Highest education level: Not on file   Tobacco Use    Smoking status: Never Smoker    Smokeless tobacco: Never Used   Substance and Sexual Activity    Alcohol use: Yes     Comment: rarely    Drug use: No    Sexual activity: Yes     Partners: Male Review of Systems   Eyes: Negative for blurred vision, double vision and photophobia. Respiratory: Negative for shortness of breath and wheezing. Cardiovascular: Negative for chest pain and palpitations. Gastrointestinal: Negative for nausea and vomiting. Neurological: Negative for dizziness, tingling, seizures, loss of consciousness and headaches. Psychiatric/Behavioral: Positive for memory loss. Remainder of comprehensive review is negative. Physical Exam :    Visit Vitals  /88   Pulse (!) 106   Ht 5' 2\" (1.575 m)   Wt 92.5 kg (204 lb)   LMP 11/07/2010   SpO2 95%   BMI 37.31 kg/m²       General: Well defined, nourished, and groomed individual in no acute distress.    Neck: Supple, nontender, no bruits, no pain with resistance to active range of motion.    Heart: Regular rate and rhythm, no murmurs, rub, or gallop. Normal S1S2. Lungs: Clear to auscultation bilaterally with equal chest expansion, no cough, no wheeze  Musculoskeletal: Extremities revealed no edema and had full range of motion of joints.    Psych: Good mood and bright affect    NEUROLOGICAL EXAMINATION:    Mental Status: Alert and oriented to person, place, and time    Cranial Nerves:    II, III, IV, VI: Visual acuity grossly intact. Visual fields are normal.    Pupils are equal, round, and reactive to light and accommodation.    Extra-ocular movements are full and fluid. Fundoscopic exam was benign, no ptosis or nystagmus.    V-XII: Hearing is grossly intact. Facial features are symmetric, with normal sensation and strength. The palate rises symmetrically and the tongue protrudes midline. Sternocleidomastoids 5/5. Motor Examination: Normal tone, bulk, and strength, 5/5 muscle strength throughout. Coordination: Finger to nose was normal. No resting or intention tremor    Gait and Station: Steady while walking. Normal arm swing. No pronator drift. No muscle wasting or fasiculations noted.      Reflexes: DTRs 2+ throughout. Results for orders placed or performed during the hospital encounter of 11/15/19   CBC W/O DIFF   Result Value Ref Range    WBC 6.9 3.6 - 11.0 K/uL    RBC 5.42 (H) 3.80 - 5.20 M/uL    HGB 15.5 11.5 - 16.0 g/dL    HCT 49.0 (H) 35.0 - 47.0 %    MCV 90.4 80.0 - 99.0 FL    MCH 28.6 26.0 - 34.0 PG    MCHC 31.6 30.0 - 36.5 g/dL    RDW 13.5 11.5 - 14.5 %    PLATELET 057 194 - 001 K/uL    MPV 11.0 8.9 - 12.9 FL    NRBC 0.0 0  WBC    ABSOLUTE NRBC 0.00 0.00 - 0.01 K/uL   LIPID PANEL   Result Value Ref Range    LIPID PROFILE          Cholesterol, total 216 (H) <200 MG/DL    Triglyceride 208 (H) <150 MG/DL    HDL Cholesterol 40 MG/DL    LDL, calculated 134.4 (H) 0 - 100 MG/DL    VLDL, calculated 41.6 MG/DL    CHOL/HDL Ratio 5.4 (H) 0.0 - 5.0     METABOLIC PANEL, COMPREHENSIVE   Result Value Ref Range    Sodium 142 136 - 145 mmol/L    Potassium 4.6 3.5 - 5.1 mmol/L    Chloride 109 (H) 97 - 108 mmol/L    CO2 26 21 - 32 mmol/L    Anion gap 7 5 - 15 mmol/L    Glucose 89 65 - 100 mg/dL    BUN 12 6 - 20 MG/DL    Creatinine 0.85 0.55 - 1.02 MG/DL    BUN/Creatinine ratio 14 12 - 20      GFR est AA >60 >60 ml/min/1.73m2    GFR est non-AA >60 >60 ml/min/1.73m2    Calcium 9.4 8.5 - 10.1 MG/DL    Bilirubin, total 0.6 0.2 - 1.0 MG/DL    ALT (SGPT) 67 12 - 78 U/L    AST (SGOT) 28 15 - 37 U/L    Alk. phosphatase 78 45 - 117 U/L    Protein, total 7.4 6.4 - 8.2 g/dL    Albumin 4.1 3.5 - 5.0 g/dL    Globulin 3.3 2.0 - 4.0 g/dL    A-G Ratio 1.2 1.1 - 2.2     TSH 3RD GENERATION   Result Value Ref Range    TSH 1.49 0.36 - 3.74 uIU/mL       Orders Placed This Encounter    REFERRAL TO NEUROPSYCHOLOGY     Referral Priority:   Routine     Referral Type:   Consultation     Referral Reason:   Specialty Services Required     Referred to Provider:   Samuel Oliveros PsyD    lisdexamfetamine (VYVANSE) 10 mg capsule     Sig: Take 1 Cap by mouth daily. Max Daily Amount: 10 mg. Dispense:  30 Cap     Refill:  0       1. Attention deficit hyperactivity disorder (ADHD), predominantly inattentive type    2. MCI (mild cognitive impairment)        ADHD- try Vyvanse 10 mg to help with attention, processing, etc.   Discussed side effects. Try in AM.   Refer to neuropsych to evaluate for changes since last visit. FU after. Migraines are doing great on Aimovig.              This note will not be viewable in [employer]

## 2020-01-31 RX ORDER — ROSUVASTATIN CALCIUM 10 MG/1
TABLET, COATED ORAL
Qty: 90 TAB | Refills: 0 | Status: SHIPPED | OUTPATIENT
Start: 2020-01-31 | End: 2020-05-15 | Stop reason: SDUPTHER

## 2020-02-06 ENCOUNTER — OFFICE VISIT (OUTPATIENT)
Dept: INTERNAL MEDICINE CLINIC | Age: 60
End: 2020-02-06

## 2020-02-06 VITALS
TEMPERATURE: 97.7 F | DIASTOLIC BLOOD PRESSURE: 94 MMHG | SYSTOLIC BLOOD PRESSURE: 157 MMHG | HEIGHT: 62 IN | BODY MASS INDEX: 37.31 KG/M2 | RESPIRATION RATE: 14 BRPM | OXYGEN SATURATION: 97 % | HEART RATE: 86 BPM

## 2020-02-06 DIAGNOSIS — I10 ESSENTIAL HYPERTENSION: ICD-10-CM

## 2020-02-06 DIAGNOSIS — J30.2 SEASONAL ALLERGIC RHINITIS, UNSPECIFIED TRIGGER: Primary | ICD-10-CM

## 2020-02-06 RX ORDER — LOSARTAN POTASSIUM 100 MG/1
100 TABLET ORAL DAILY
Qty: 30 TAB | Refills: 2 | Status: SHIPPED | OUTPATIENT
Start: 2020-02-06 | End: 2020-05-15

## 2020-02-06 RX ORDER — MONTELUKAST SODIUM 10 MG/1
10 TABLET ORAL DAILY
Qty: 30 TAB | Refills: 2 | Status: SHIPPED | OUTPATIENT
Start: 2020-02-06 | End: 2020-05-30

## 2020-02-06 RX ORDER — FLUTICASONE PROPIONATE 50 MCG
2 SPRAY, SUSPENSION (ML) NASAL DAILY
Qty: 1 BOTTLE | Refills: 3 | Status: SHIPPED | OUTPATIENT
Start: 2020-02-06 | End: 2021-03-22 | Stop reason: ALTCHOICE

## 2020-02-06 NOTE — PATIENT INSTRUCTIONS
Allergies: Care Instructions  Your Care Instructions    Allergies occur when your body's defense system (immune system) overreacts to certain substances. The immune system treats a harmless substance as if it were a harmful germ or virus. Many things can cause this overreaction, including pollens, medicine, food, dust, animal dander, and mold. Allergies can be mild or severe. Mild allergies can be managed with home treatment. But medicine may be needed to prevent problems. Managing your allergies is an important part of staying healthy. Your doctor may suggest that you have allergy testing to help find out what is causing your allergies. When you know what things trigger your symptoms, you can avoid them. This can prevent allergy symptoms and other health problems. For severe allergies that cause reactions that affect your whole body (anaphylactic reactions), your doctor may prescribe a shot of epinephrine to carry with you in case you have a severe reaction. Learn how to give yourself the shot and keep it with you at all times. Make sure it is not . Follow-up care is a key part of your treatment and safety. Be sure to make and go to all appointments, and call your doctor if you are having problems. It's also a good idea to know your test results and keep a list of the medicines you take. How can you care for yourself at home? · If you have been told by your doctor that dust or dust mites are causing your allergy, decrease the dust around your bed:  ? Wash sheets, pillowcases, and other bedding in hot water every week. ? Use dust-proof covers for pillows, duvets, and mattresses. Avoid plastic covers because they tear easily and do not \"breathe. \" Wash as instructed on the label. ? Do not use any blankets and pillows that you do not need. ? Use blankets that you can wash in your washing machine. ? Consider removing drapes and carpets, which attract and hold dust, from your bedroom.   · If you are allergic to house dust and mites, do not use home humidifiers. Your doctor can suggest ways you can control dust and mites. · Look for signs of cockroaches. Cockroaches cause allergic reactions. Use cockroach baits to get rid of them. Then, clean your home well. Cockroaches like areas where grocery bags, newspapers, empty bottles, or cardboard boxes are stored. Do not keep these inside your home, and keep trash and food containers sealed. Seal off any spots where cockroaches might enter your home. · If you are allergic to mold, get rid of furniture, rugs, and drapes that smell musty. Check for mold in the bathroom. · If you are allergic to outdoor pollen or mold spores, use air-conditioning. Change or clean all filters every month. Keep windows closed. · If you are allergic to pollen, stay inside when pollen counts are high. Use a vacuum  with a HEPA filter or a double-thickness filter at least two times each week. · Stay inside when air pollution is bad. Avoid paint fumes, perfumes, and other strong odors. · Avoid conditions that make your allergies worse. Stay away from smoke. Do not smoke or let anyone else smoke in your house. Do not use fireplaces or wood-burning stoves. · If you are allergic to your pets, change the air filter in your furnace every month. Use high-efficiency filters. · If you are allergic to pet dander, keep pets outside or out of your bedroom. Old carpet and cloth furniture can hold a lot of animal dander. You may need to replace them. When should you call for help? Give an epinephrine shot if:    · You think you are having a severe allergic reaction.     · You have symptoms in more than one body area, such as mild nausea and an itchy mouth.    After giving an epinephrine shot call 911, even if you feel better.   Call 911 if:    · You have symptoms of a severe allergic reaction. These may include:  ? Sudden raised, red areas (hives) all over your body. ?  Swelling of the throat, mouth, lips, or tongue. ? Trouble breathing. ? Passing out (losing consciousness). Or you may feel very lightheaded or suddenly feel weak, confused, or restless.     · You have been given an epinephrine shot, even if you feel better.    Call your doctor now or seek immediate medical care if:    · You have symptoms of an allergic reaction, such as:  ? A rash or hives (raised, red areas on the skin). ? Itching. ? Swelling. ? Belly pain, nausea, or vomiting.    Watch closely for changes in your health, and be sure to contact your doctor if:    · You do not get better as expected. Where can you learn more? Go to http://keshia-sabrina.info/. Enter I173 in the search box to learn more about \"Allergies: Care Instructions. \"  Current as of: April 7, 2019  Content Version: 12.2  © 9464-4484 [employer]. Care instructions adapted under license by [employer] (which disclaims liability or warranty for this information). If you have questions about a medical condition or this instruction, always ask your healthcare professional. [address] any warranty or liability for your use of this information. DASH Diet: Care Instructions  Your Care Instructions    The DASH diet is an eating plan that can help lower your blood pressure. DASH stands for Dietary Approaches to Stop Hypertension. Hypertension is high blood pressure. The DASH diet focuses on eating foods that are high in calcium, potassium, and magnesium. These nutrients can lower blood pressure. The foods that are highest in these nutrients are fruits, vegetables, low-fat dairy products, nuts, seeds, and legumes. But taking calcium, potassium, and magnesium supplements instead of eating foods that are high in those nutrients does not have the same effect. The DASH diet also includes whole grains, fish, and poultry.   The DASH diet is one of several lifestyle changes your doctor may recommend to lower your high blood pressure. Your doctor may also want you to decrease the amount of sodium in your diet. Lowering sodium while following the DASH diet can lower blood pressure even further than just the DASH diet alone. Follow-up care is a key part of your treatment and safety. Be sure to make and go to all appointments, and call your doctor if you are having problems. It's also a good idea to know your test results and keep a list of the medicines you take. How can you care for yourself at home? Following the DASH diet  · Eat 4 to 5 servings of fruit each day. A serving is 1 medium-sized piece of fruit, ½ cup chopped or canned fruit, 1/4 cup dried fruit, or 4 ounces (½ cup) of fruit juice. Choose fruit more often than fruit juice. · Eat 4 to 5 servings of vegetables each day. A serving is 1 cup of lettuce or raw leafy vegetables, ½ cup of chopped or cooked vegetables, or 4 ounces (½ cup) of vegetable juice. Choose vegetables more often than vegetable juice. · Get 2 to 3 servings of low-fat and fat-free dairy each day. A serving is 8 ounces of milk, 1 cup of yogurt, or 1 ½ ounces of cheese. · Eat 6 to 8 servings of grains each day. A serving is 1 slice of bread, 1 ounce of dry cereal, or ½ cup of cooked rice, pasta, or cooked cereal. Try to choose whole-grain products as much as possible. · Limit lean meat, poultry, and fish to 2 servings each day. A serving is 3 ounces, about the size of a deck of cards. · Eat 4 to 5 servings of nuts, seeds, and legumes (cooked dried beans, lentils, and split peas) each week. A serving is 1/3 cup of nuts, 2 tablespoons of seeds, or ½ cup of cooked beans or peas. · Limit fats and oils to 2 to 3 servings each day. A serving is 1 teaspoon of vegetable oil or 2 tablespoons of salad dressing. · Limit sweets and added sugars to 5 servings or less a week. A serving is 1 tablespoon jelly or jam, ½ cup sorbet, or 1 cup of lemonade.   · Eat less than 2,300 milligrams (mg) of sodium a day. If you limit your sodium to 1,500 mg a day, you can lower your blood pressure even more. Tips for success  · Start small. Do not try to make dramatic changes to your diet all at once. You might feel that you are missing out on your favorite foods and then be more likely to not follow the plan. Make small changes, and stick with them. Once those changes become habit, add a few more changes. · Try some of the following:  ? Make it a goal to eat a fruit or vegetable at every meal and at snacks. This will make it easy to get the recommended amount of fruits and vegetables each day. ? Try yogurt topped with fruit and nuts for a snack or healthy dessert. ? Add lettuce, tomato, cucumber, and onion to sandwiches. ? Combine a ready-made pizza crust with low-fat mozzarella cheese and lots of vegetable toppings. Try using tomatoes, squash, spinach, broccoli, carrots, cauliflower, and onions. ? Have a variety of cut-up vegetables with a low-fat dip as an appetizer instead of chips and dip. ? Sprinkle sunflower seeds or chopped almonds over salads. Or try adding chopped walnuts or almonds to cooked vegetables. ? Try some vegetarian meals using beans and peas. Add garbanzo or kidney beans to salads. Make burritos and tacos with mashed foster beans or black beans. Where can you learn more? Go to http://keshia-sabrina.info/. Enter Y149 in the search box to learn more about \"DASH Diet: Care Instructions. \"  Current as of: April 9, 2019  Content Version: 12.2  © 3155-4203 [employer] Care instructions adapted under license by [employer] (which disclaims liability or warranty for this information). If you have questions about a medical condition or this instruction, always ask your healthcare professional. [address] any warranty or liability for your use of this information.          High Blood Pressure: Care Instructions  Overview    It's normal for blood pressure to go up and down throughout the day. But if it stays up, you have high blood pressure. Another name for high blood pressure is hypertension. Despite what a lot of people think, high blood pressure usually doesn't cause headaches or make you feel dizzy or lightheaded. It usually has no symptoms. But it does increase your risk of stroke, heart attack, and other problems. You and your doctor will talk about your risks of these problems based on your blood pressure. Your doctor will give you a goal for your blood pressure. Your goal will be based on your health and your age. Lifestyle changes, such as eating healthy and being active, are always important to help lower blood pressure. You might also take medicine to reach your blood pressure goal.  Follow-up care is a key part of your treatment and safety. Be sure to make and go to all appointments, and call your doctor if you are having problems. It's also a good idea to know your test results and keep a list of the medicines you take. How can you care for yourself at home? Medical treatment  · If you stop taking your medicine, your blood pressure will go back up. You may take one or more types of medicine to lower your blood pressure. Be safe with medicines. Take your medicine exactly as prescribed. Call your doctor if you think you are having a problem with your medicine. · Talk to your doctor before you start taking aspirin every day. Aspirin can help certain people lower their risk of a heart attack or stroke. But taking aspirin isn't right for everyone, because it can cause serious bleeding. · See your doctor regularly. You may need to see the doctor more often at first or until your blood pressure comes down. · If you are taking blood pressure medicine, talk to your doctor before you take decongestants or anti-inflammatory medicine, such as ibuprofen.  Some of these medicines can raise blood pressure. · Learn how to check your blood pressure at home. Lifestyle changes  · Stay at a healthy weight. This is especially important if you put on weight around the waist. Losing even 10 pounds can help you lower your blood pressure. · If your doctor recommends it, get more exercise. Walking is a good choice. Bit by bit, increase the amount you walk every day. Try for at least 30 minutes on most days of the week. You also may want to swim, bike, or do other activities. · Avoid or limit alcohol. Talk to your doctor about whether you can drink any alcohol. · Try to limit how much sodium you eat to less than 2,300 milligrams (mg) a day. Your doctor may ask you to try to eat less than 1,500 mg a day. · Eat plenty of fruits (such as bananas and oranges), vegetables, legumes, whole grains, and low-fat dairy products. · Lower the amount of saturated fat in your diet. Saturated fat is found in animal products such as milk, cheese, and meat. Limiting these foods may help you lose weight and also lower your risk for heart disease. · Do not smoke. Smoking increases your risk for heart attack and stroke. If you need help quitting, talk to your doctor about stop-smoking programs and medicines. These can increase your chances of quitting for good. When should you call for help? Call  911 anytime you think you may need emergency care. This may mean having symptoms that suggest that your blood pressure is causing a serious heart or blood vessel problem. Your blood pressure may be over 180/120.   For example, call  911 if:    · You have symptoms of a heart attack. These may include:  ? Chest pain or pressure, or a strange feeling in the chest.  ? Sweating. ? Shortness of breath. ? Nausea or vomiting. ? Pain, pressure, or a strange feeling in the back, neck, jaw, or upper belly or in one or both shoulders or arms. ? Lightheadedness or sudden weakness.   ? A fast or irregular heartbeat.     · You have symptoms of a stroke. These may include:  ? Sudden numbness, tingling, weakness, or loss of movement in your face, arm, or leg, especially on only one side of your body. ? Sudden vision changes. ? Sudden trouble speaking. ? Sudden confusion or trouble understanding simple statements. ? Sudden problems with walking or balance. ? A sudden, severe headache that is different from past headaches.     · You have severe back or belly pain.    Do not wait until your blood pressure comes down on its own. Get help right away.   Call your doctor now or seek immediate care if:    · Your blood pressure is much higher than normal (such as 180/120 or higher), but you don't have symptoms.     · You think high blood pressure is causing symptoms, such as:  ? Severe headache.  ? Blurry vision.    Watch closely for changes in your health, and be sure to contact your doctor if:    · Your blood pressure measures higher than your doctor recommends at least 2 times. That means the top number is higher or the bottom number is higher, or both.     · You think you may be having side effects from your blood pressure medicine. Where can you learn more? Go to http://keshia-sabrina.info/. Enter U883 in the search box to learn more about \"High Blood Pressure: Care Instructions. \"  Current as of: April 9, 2019  Content Version: 12.2  © 8069-5370 [employer]. Care instructions adapted under license by [employer] (which disclaims liability or warranty for this information). If you have questions about a medical condition or this instruction, always ask your healthcare professional. [place] [address] any warranty or liability for your use of this information.

## 2020-02-07 NOTE — PROGRESS NOTES
HISTORY OF PRESENT ILLNESS  Evelia Benson is a 61 y.o. female. HPI  Presents with complaints of persistent nasal congestion, post nasal drainage, dry cough, fatigue for the past 4 months. Has been taking Zyrtec and Mucinex on a regular basis but has not noted much improvement. Reports she does not typically have allergies during fall and winter. Having bilateral ear fullness and popping sensation for weeks. Denies fever, chills, purulent mucous. Denies shortness of breath, wheezing. Has not tried any other allergy medications other than Zyrtec. Subjective:   Evelia Benson is a 61 y.o. female with hypertension. Hypertension ROS: taking medications as instructed, no medication side effects noted, no TIA's, no chest pain on exertion, no dyspnea on exertion, no swelling of ankles. New concerns: her blood pressure is elevated today and she has not missed any of her Losartan. She has checked her blood pressure outside of medical office and reports has been running higher overall. She has been feeling more fatigued. Patient Active Problem List   Diagnosis Code    Dysfunctional uterine bleeding N93.8    Migraine G43.909    Rhinitis, allergic J30.9    Insomnia G47.00    Essential hypertension I10    Severe obesity (BMI 35.0-39. 9) with comorbidity ([facility] [address] E66.01    Dyslipidemia E78.5     Past Surgical History:   Procedure Laterality Date    CHEST SURGERY PROCEDURE UNLISTED      desmoid tumor    ENDOSCOPY, COLON, DIAGNOSTIC  5/2010    HX BREAST BIOPSY Right     benign    HX GYN      d and c    HX OTHER SURGICAL      tumor removal in chest removed     Social History     Socioeconomic History    Marital status:      Spouse name: Not on file    Number of children: Not on file    Years of education: Not on file    Highest education level: Not on file   Occupational History    Not on file   Social Needs    Financial resource strain: Not on file    Food insecurity:     Worry: Not on file     Inability: Not on file    Transportation needs:     Medical: Not on file     Non-medical: Not on file   Tobacco Use    Smoking status: Never Smoker    Smokeless tobacco: Never Used   Substance and Sexual Activity    Alcohol use: Yes     Comment: rarely    Drug use: No    Sexual activity: Yes     Partners: Male   Lifestyle    Physical activity:     Days per week: Not on file     Minutes per session: Not on file    Stress: Not on file   Relationships    Social connections:     Talks on phone: Not on file     Gets together: Not on file     Attends Mandaen service: Not on file     Active member of club or organization: Not on file     Attends meetings of clubs or organizations: Not on file     Relationship status: Not on file    Intimate partner violence:     Fear of current or ex partner: Not on file     Emotionally abused: Not on file     Physically abused: Not on file     Forced sexual activity: Not on file   Other Topics Concern    Not on file   Social History Narrative    Not on file     Family History   Problem Relation Age of Onset    Thyroid Disease Mother         goiter    Hypertension Father     Stroke Father     Thyroid Disease Sister         nodules    Hypertension Sister     Cancer Maternal Grandmother         polycythemia    Cancer Paternal Grandmother         breast    Breast Cancer Paternal Grandmother     Cancer Paternal Grandfather         lung cancer    Other Sister         lyme disease    No Known Problems Sister      Current Outpatient Medications   Medication Sig    montelukast (SINGULAIR) 10 mg tablet Take 1 Tab by mouth daily.  fluticasone propionate (FLONASE) 50 mcg/actuation nasal spray 2 Sprays by Both Nostrils route daily.  losartan (COZAAR) 100 mg tablet Take 1 Tab by mouth daily.  rosuvastatin (CRESTOR) 10 mg tablet TAKE 1 TAB BY MOUTH NIGHTLY.     losartan (COZAAR) 50 mg tablet TAKE 1 TABLET BY MOUTH EVERY DAY    erenumab-aooe (AIMOVIG AUTOINJECTOR) 70 mg/mL injection 1 mL by SubCUTAneous route every thirty (30) days.  donepezil (ARICEPT) 10 mg tablet Take 1 Tab by mouth nightly.  multivitamin (ONE A DAY) tablet Take 1 Tab by mouth daily.  vit a,c & e-lutein-minerals (OCUVITE) tablet daily.  b complex vitamins tablet Take 1 Tab by mouth as needed.  omeprazole (PRILOSEC OTC) 20 mg tablet Take 20 mg by mouth daily. (Patient taking differently: Take 20 mg by mouth as needed.)    lisdexamfetamine (VYVANSE) 10 mg capsule Take 1 Cap by mouth daily. Max Daily Amount: 10 mg.    rizatriptan (MAXALT-MLT) 10 mg disintegrating tablet DISSOLVE 1 TABLET BY MOUTH ONCE AS NEEDED FOR MIGRAINE FOR UP TO 1 DOSE    amitriptyline (ELAVIL) 75 mg tablet Take  by mouth.  Hydrochlorothiazide 100 % powd Take  by mouth. No current facility-administered medications for this visit. Allergies   Allergen Reactions    Flu Vaccine [id number](36mo,Up) Anaphylaxis    Erythromycin Nausea and Vomiting    Lisinopril Other (comments)     \"margoth achy\"     Immunization History   Administered Date(s) Administered    Influenza Vaccine 10/01/2014, 10/24/2016, 09/20/2017, 10/03/2018    Influenza Vaccine Whole 10/01/2012    TDAP Vaccine 12/21/2010            Review of Systems   Constitutional: Positive for malaise/fatigue. Negative for chills and fever. HENT: Positive for congestion. Negative for sinus pain, sore throat and tinnitus. Respiratory: Positive for cough. Negative for sputum production, shortness of breath and wheezing. Cardiovascular: Negative for chest pain and palpitations. Gastrointestinal: Negative for nausea and vomiting. Musculoskeletal: Negative for myalgias. Neurological: Positive for headaches.        BP (!) 157/94 (BP 1 Location: Left arm, BP Patient Position: Sitting)   Pulse 86   Temp 97.7 °F (36.5 °C) (Oral)   Resp 14   Ht 5' 2\" (1.575 m)   LMP 11/07/2010   SpO2 97%   BMI 37.31 kg/m²   Physical Exam  Vitals signs and nursing note reviewed. Constitutional:       Appearance: Normal appearance. HENT:      Head: Normocephalic and atraumatic. Right Ear: A middle ear effusion is present. Tympanic membrane is not injected or erythematous. Left Ear: A middle ear effusion is present. Tympanic membrane is not injected or erythematous. Nose:      Right Turbinates: Swollen. Left Turbinates: Swollen. Right Sinus: No maxillary sinus tenderness or frontal sinus tenderness. Left Sinus: No maxillary sinus tenderness or frontal sinus tenderness. Mouth/Throat:      Mouth: Mucous membranes are moist.      Pharynx: Posterior oropharyngeal erythema present. Comments: Cobblestoning to posterior pharynx with visible post nasal drainage  Neck:      Musculoskeletal: Normal range of motion and neck supple. Cardiovascular:      Rate and Rhythm: Normal rate and regular rhythm. Pulmonary:      Effort: Pulmonary effort is normal.      Breath sounds: Normal breath sounds. No wheezing or rhonchi. Musculoskeletal:         General: No swelling. Skin:     General: Skin is warm and dry. Neurological:      General: No focal deficit present. Mental Status: She is alert and oriented to person, place, and time. ASSESSMENT and PLAN  Diagnoses and all orders for this visit:    1. Seasonal allergic rhinitis, unspecified trigger -- has had little improvement with Zyrtec; trial of Singulair and Flonase nasal spray. -     montelukast (SINGULAIR) 10 mg tablet; Take 1 Tab by mouth daily. -     fluticasone propionate (FLONASE) 50 mcg/actuation nasal spray; 2 Sprays by Both Nostrils route daily.  -     REFERRAL TO ALLERGY    2. Essential hypertension -- not well controlled on Losartan 50 mg; will increase to 100 mg and follow up in office in 4-6 weeks. Limit sodium in diet. -     losartan (COZAAR) 100 mg tablet; Take 1 Tab by mouth daily.       lab results and schedule of future lab studies reviewed with patient  reviewed diet, exercise and weight control  reviewed medications and side effects in detail

## 2020-02-21 ENCOUNTER — HOSPITAL ENCOUNTER (OUTPATIENT)
Dept: MAMMOGRAPHY | Age: 60
Discharge: HOME | End: 2020-02-21
Attending: INTERNAL MEDICINE
Payer: COMMERCIAL

## 2020-02-21 DIAGNOSIS — Z12.31 VISIT FOR SCREENING MAMMOGRAM: ICD-10-CM

## 2020-02-21 PROCEDURE — 77063 BREAST TOMOSYNTHESIS BI: CPT

## 2020-03-09 RX ORDER — DONEPEZIL HYDROCHLORIDE 10 MG/1
TABLET, FILM COATED ORAL
Qty: 90 TAB | Refills: 1 | Status: SHIPPED | OUTPATIENT
Start: 2020-03-09 | End: 2020-09-08

## 2020-05-15 ENCOUNTER — HOSPITAL ENCOUNTER (OUTPATIENT)
Dept: LAB | Age: 60
Discharge: HOME | End: 2020-05-15

## 2020-05-15 ENCOUNTER — OFFICE VISIT (OUTPATIENT)
Dept: INTERNAL MEDICINE CLINIC | Age: 60
End: 2020-05-15

## 2020-05-15 VITALS
TEMPERATURE: 98.4 F | RESPIRATION RATE: 16 BRPM | HEART RATE: 78 BPM | OXYGEN SATURATION: 96 % | HEIGHT: 62 IN | BODY MASS INDEX: 36.88 KG/M2 | DIASTOLIC BLOOD PRESSURE: 81 MMHG | WEIGHT: 200.4 LBS | SYSTOLIC BLOOD PRESSURE: 130 MMHG

## 2020-05-15 DIAGNOSIS — Z12.11 SCREENING FOR COLON CANCER: ICD-10-CM

## 2020-05-15 DIAGNOSIS — E78.2 MIXED HYPERLIPIDEMIA: ICD-10-CM

## 2020-05-15 DIAGNOSIS — I10 ESSENTIAL HYPERTENSION: Primary | ICD-10-CM

## 2020-05-15 DIAGNOSIS — G43.919 INTRACTABLE MIGRAINE WITHOUT STATUS MIGRAINOSUS, UNSPECIFIED MIGRAINE TYPE: ICD-10-CM

## 2020-05-15 DIAGNOSIS — I10 ESSENTIAL HYPERTENSION: ICD-10-CM

## 2020-05-15 DIAGNOSIS — J30.2 SEASONAL ALLERGIC RHINITIS, UNSPECIFIED TRIGGER: ICD-10-CM

## 2020-05-15 PROBLEM — Z96.1 PSEUDOPHAKIA OF RIGHT EYE: Status: ACTIVE | Noted: 2017-08-14

## 2020-05-15 PROBLEM — Z96.1 PSEUDOPHAKIA OF BOTH EYES: Status: ACTIVE | Noted: 2018-05-02

## 2020-05-15 LAB
ALBUMIN SERPL-MCNC: 4 G/DL (ref 3.5–5)
ALBUMIN/GLOB SERPL: 1.3 {RATIO} (ref 1.1–2.2)
ALP SERPL-CCNC: 87 U/L (ref 45–117)
ALT SERPL-CCNC: 54 U/L (ref 12–78)
ANION GAP SERPL CALC-SCNC: 6 MMOL/L (ref 5–15)
AST SERPL-CCNC: 19 U/L (ref 15–37)
BILIRUB SERPL-MCNC: 0.7 MG/DL (ref 0.2–1)
BUN SERPL-MCNC: 14 MG/DL (ref 6–20)
BUN/CREAT SERPL: 16 (ref 12–20)
CALCIUM SERPL-MCNC: 9.5 MG/DL (ref 8.5–10.1)
CHLORIDE SERPL-SCNC: 107 MMOL/L (ref 97–108)
CHOLEST SERPL-MCNC: 194 MG/DL
CO2 SERPL-SCNC: 27 MMOL/L (ref 21–32)
CREAT SERPL-MCNC: 0.86 MG/DL (ref 0.55–1.02)
GLOBULIN SER CALC-MCNC: 3 G/DL (ref 2–4)
GLUCOSE SERPL-MCNC: 100 MG/DL (ref 65–100)
HDLC SERPL-MCNC: 44 MG/DL
HDLC SERPL: 4.4 {RATIO} (ref 0–5)
LDLC SERPL CALC-MCNC: 109.2 MG/DL (ref 0–100)
LIPID PROFILE,FLP: ABNORMAL
POTASSIUM SERPL-SCNC: 4.2 MMOL/L (ref 3.5–5.1)
PROT SERPL-MCNC: 7 G/DL (ref 6.4–8.2)
SODIUM SERPL-SCNC: 140 MMOL/L (ref 136–145)
TRIGL SERPL-MCNC: 204 MG/DL (ref ?–150)
VLDLC SERPL CALC-MCNC: 40.8 MG/DL

## 2020-05-15 RX ORDER — ROSUVASTATIN CALCIUM 10 MG/1
10 TABLET, COATED ORAL
Qty: 90 TAB | Refills: 1 | Status: SHIPPED | OUTPATIENT
Start: 2020-05-15 | End: 2021-07-02

## 2020-05-15 RX ORDER — LOSARTAN POTASSIUM 50 MG/1
50 TABLET ORAL DAILY
Qty: 90 TAB | Refills: 1 | Status: SHIPPED | OUTPATIENT
Start: 2020-05-15 | End: 2020-11-06

## 2020-05-15 NOTE — PROGRESS NOTES
HISTORY OF PRESENT ILLNESS  Megan Ramsey is a 61 y.o. female. HPI  Hypertension ROS: taking medications as instructed, no medication side effects noted, no TIA's, no chest pain on exertion, no dyspnea on exertion, no swelling of ankles. New concerns: BP in office today is 130/81. Continues on Losartan 50 mg. Pt is not exercising. Hyperlipidemia:  Cardiovascular risk analysis - 61 y.o. female LDL goal is under 130. ROS: taking medications as instructed, no medication side effects noted, no TIA's, no chest pain on exertion, no dyspnea on exertion, no swelling of ankles. Tolerating meds, no myalgias or other side effects noted. New concerns: Pt's last LDL was 134.4 on 11/15/19. Continues on Rosuvastatin 10 mg. Mood: Pt reports that her  quit his job since his boss was difficult to deal with. 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     Colonoscopy: Per chart review, pt has a surgical pathology report from 5/13/2010 from a colonoscopy with Dr. Roberta Crowder that was negative on all accounts of celiac, microscopic colitis, and malignancies. Pt would like to a referral for a GI. Eczema: Pt reports that she is having issues with dry skin. She is using lotion almost nightly with no improvement. Of note, pt recalls having eczema as a child. Migraine: Stable, pt has not been able to comply with Aimovig due to her pharmacy having difficulty filling it. Continues to f/u with neurologist. No noted flares. Seasonal allergic rhinitis: Stable, pt continues to comply with Singulair 10 mg. Pt confirms intolerance to Azithromycin (vomiting) and Lisinopril (myalgias). Pt denies allergy to flu vaccine. She gets it every year. Pt does not have any other acute concerns today. Review of Systems   All other systems reviewed and are negative.       Physical Exam  Constitutional:       Appearance: Normal appearance. HENT:      Right Ear: Hearing, tympanic membrane and external ear normal.      Left Ear: Hearing, tympanic membrane and external ear normal.      Mouth/Throat:      Mouth: Mucous membranes are moist.      Pharynx: Oropharynx is clear. Cardiovascular:      Rate and Rhythm: Normal rate and regular rhythm. Pulmonary:      Effort: Pulmonary effort is normal.      Breath sounds: Normal breath sounds and air entry. Musculoskeletal: Normal range of motion. Skin:     General: Skin is warm and dry. Comments: Dry patches on skin- eczema    Neurological:      General: No focal deficit present. Mental Status: She is alert and oriented to person, place, and time. Psychiatric:         Mood and Affect: Mood normal.         Behavior: Behavior normal.         ASSESSMENT and PLAN  Diagnoses and all orders for this visit:    1. Essential hypertension  BP is at goal. I do not recommend any change in medications. Advised pt to try and exercise daily- even if she is only taking a brisk walk for 20 minutes a day. Will continue to monitor for improvements or changes. -     losartan (COZAAR) 50 mg tablet; Take 1 Tab by mouth daily.  -     METABOLIC PANEL, COMPREHENSIVE; Future    2. Mixed hyperlipidemia  Presumed stable, will recheck labs today. Patient is tolerating medications with no myalgias. I do not recommend any change in treatment plan. -     rosuvastatin (CRESTOR) 10 mg tablet; Take 1 Tab by mouth nightly. -     LIPID PANEL; Future    3. Intractable migraine without status migrainosus, unspecified migraine type  Managed by neurologist. Stable and well-managed with no noted flares. Advised pt to talk to our specialist pharmacy with [facility] to re-fill her Aimovig. Will continue to monitor for improvements or changes. 4. Seasonal allergic rhinitis, unspecified trigger  Stable and well-managed with Singulair 10 mg. No change in medications.      5. Screening for colon cancer  Reminded pt that she is due for her colonoscopy. Verbal referral to Dr. Junior Estes. Waiting on scan results.    -     REFERRAL TO GASTROENTEROLOGY    Additional comments: Advised pt to use Aquaphor after getting out of the shower when she is slightly damp. Will continue to monitor for improvements or changes. Lab results and schedule of future lab studies reviewed with patient. Reviewed diet, exercise and weight control. Written by Nina Galloway, as dictated by Alex Jack MD.     Current diagnosis and concerns discussed with pt at length. Understands risks and benefits or current treatment plan and medications and accepts the treatment and medication with any possible risks. Pt asks appropriate questions which were answered. Pt instructed to call with any concerns or problems.

## 2020-07-28 ENCOUNTER — HOSPITAL ENCOUNTER (OUTPATIENT)
Dept: LAB | Age: 60
Discharge: HOME | End: 2020-07-28
Payer: COMMERCIAL

## 2020-07-28 DIAGNOSIS — U07.1 ASYMPTOMATIC COVID-19 VIRUS INFECTION: ICD-10-CM

## 2020-07-28 PROCEDURE — 87635 SARS-COV-2 COVID-19 AMP PRB: CPT

## 2020-07-30 LAB — SARS-COV-2, COV2NT: NOT DETECTED

## 2020-07-31 ENCOUNTER — ANESTHESIA EVENT (OUTPATIENT)
Dept: ENDOSCOPY | Age: 60
End: 2020-07-31
Payer: COMMERCIAL

## 2020-07-31 ENCOUNTER — ANESTHESIA (OUTPATIENT)
Dept: ENDOSCOPY | Age: 60
End: 2020-07-31
Payer: COMMERCIAL

## 2020-07-31 ENCOUNTER — HOSPITAL ENCOUNTER (OUTPATIENT)
Age: 60
Setting detail: OUTPATIENT SURGERY
Discharge: HOME | End: 2020-07-31
Attending: INTERNAL MEDICINE | Admitting: INTERNAL MEDICINE
Payer: COMMERCIAL

## 2020-07-31 VITALS
BODY MASS INDEX: 36.03 KG/M2 | HEIGHT: 62 IN | WEIGHT: 195.77 LBS | TEMPERATURE: 97.7 F | HEART RATE: 95 BPM | OXYGEN SATURATION: 95 % | RESPIRATION RATE: 17 BRPM | DIASTOLIC BLOOD PRESSURE: 78 MMHG | SYSTOLIC BLOOD PRESSURE: 112 MMHG

## 2020-07-31 PROCEDURE — 88305 TISSUE EXAM BY PATHOLOGIST: CPT

## 2020-07-31 PROCEDURE — 77030013992 HC SNR POLYP ENDOSC BSC -B: Performed by: INTERNAL MEDICINE

## 2020-07-31 PROCEDURE — 74011250636 HC RX REV CODE- 250/636: Performed by: INTERNAL MEDICINE

## 2020-07-31 PROCEDURE — 74011000250 HC RX REV CODE- 250: Performed by: NURSE ANESTHETIST, CERTIFIED REGISTERED

## 2020-07-31 PROCEDURE — 74011250636 HC RX REV CODE- 250/636: Performed by: NURSE ANESTHETIST, CERTIFIED REGISTERED

## 2020-07-31 PROCEDURE — 76040000019: Performed by: INTERNAL MEDICINE

## 2020-07-31 PROCEDURE — 76060000031 HC ANESTHESIA FIRST 0.5 HR: Performed by: INTERNAL MEDICINE

## 2020-07-31 RX ORDER — PROPOFOL 10 MG/ML
INJECTION, EMULSION INTRAVENOUS
Status: DISCONTINUED | OUTPATIENT
Start: 2020-07-31 | End: 2020-07-31 | Stop reason: HOSPADM

## 2020-07-31 RX ORDER — LIDOCAINE HYDROCHLORIDE 20 MG/ML
INJECTION, SOLUTION EPIDURAL; INFILTRATION; INTRACAUDAL; PERINEURAL AS NEEDED
Status: DISCONTINUED | OUTPATIENT
Start: 2020-07-31 | End: 2020-07-31 | Stop reason: HOSPADM

## 2020-07-31 RX ORDER — ATROPINE SULFATE 0.1 MG/ML
0.4 INJECTION INTRAVENOUS
Status: DISCONTINUED | OUTPATIENT
Start: 2020-07-31 | End: 2020-07-31 | Stop reason: HOSPADM

## 2020-07-31 RX ORDER — EPINEPHRINE 0.1 MG/ML
1 INJECTION INTRACARDIAC; INTRAVENOUS
Status: DISCONTINUED | OUTPATIENT
Start: 2020-07-31 | End: 2020-07-31 | Stop reason: HOSPADM

## 2020-07-31 RX ORDER — SODIUM CHLORIDE 9 MG/ML
50 INJECTION, SOLUTION INTRAVENOUS CONTINUOUS
Status: DISCONTINUED | OUTPATIENT
Start: 2020-07-31 | End: 2020-07-31 | Stop reason: HOSPADM

## 2020-07-31 RX ORDER — MIDAZOLAM HYDROCHLORIDE 1 MG/ML
.25-5 INJECTION, SOLUTION INTRAMUSCULAR; INTRAVENOUS
Status: DISCONTINUED | OUTPATIENT
Start: 2020-07-31 | End: 2020-07-31 | Stop reason: HOSPADM

## 2020-07-31 RX ORDER — FLUMAZENIL 0.1 MG/ML
0.2 INJECTION INTRAVENOUS
Status: DISCONTINUED | OUTPATIENT
Start: 2020-07-31 | End: 2020-07-31 | Stop reason: HOSPADM

## 2020-07-31 RX ORDER — DEXTROMETHORPHAN/PSEUDOEPHED 2.5-7.5/.8
1.2 DROPS ORAL
Status: DISCONTINUED | OUTPATIENT
Start: 2020-07-31 | End: 2020-07-31 | Stop reason: HOSPADM

## 2020-07-31 RX ORDER — NALOXONE HYDROCHLORIDE 0.4 MG/ML
0.4 INJECTION, SOLUTION INTRAMUSCULAR; INTRAVENOUS; SUBCUTANEOUS
Status: DISCONTINUED | OUTPATIENT
Start: 2020-07-31 | End: 2020-07-31 | Stop reason: HOSPADM

## 2020-07-31 RX ORDER — PROPOFOL 10 MG/ML
INJECTION, EMULSION INTRAVENOUS AS NEEDED
Status: DISCONTINUED | OUTPATIENT
Start: 2020-07-31 | End: 2020-07-31 | Stop reason: HOSPADM

## 2020-07-31 RX ADMIN — PROPOFOL 120 MCG/KG/MIN: 10 INJECTION, EMULSION INTRAVENOUS at 10:21

## 2020-07-31 RX ADMIN — SODIUM CHLORIDE 50 ML/HR: 900 INJECTION, SOLUTION INTRAVENOUS at 10:06

## 2020-07-31 RX ADMIN — LIDOCAINE HYDROCHLORIDE 40 MG: 20 INJECTION, SOLUTION INTRAVENOUS at 10:21

## 2020-07-31 RX ADMIN — PROPOFOL 100 MG: 10 INJECTION, EMULSION INTRAVENOUS at 10:21

## 2020-07-31 NOTE — PERIOP NOTES
Received Report From Venkatesh Bell @ 7738, see anesthesia notes. Care of the patient transferred to procedure nurse Robe Mas, [facility] of Procedure and sent to post-recovery @ [phone] report given to AMI KENNEDY RN @ 0832    Patient ABD remains soft and non-tender post procedure. Pt has no complaints at this time and tolerated the procedure well. Endoscope was pre-cleaned at bedside immediately following procedure by Jacky Stone.

## 2020-07-31 NOTE — DISCHARGE INSTRUCTIONS
[address] [facility]. Suyapa Fuller M.D.  [phone]            COLON DISCHARGE INSTRUCTIONS       2020    Milagros Aguero  :  1960  [place] Medical Record Number:  [id number]      COLONOSCOPY FINDINGS:  Your colonoscopy showed one diminutive polyp that was removed and sent to pathology, mild diverticulosis in the sigmoid colon and small internal hemorrhoids. DISCOMFORT:  Redness at IV site- apply warm compress to area; if redness or soreness persist- contact your physician  There may be a slight amount of blood passed from the rectum  Gaseous discomfort- walking, belching will help relieve any discomfort  You may not operate a vehicle for 12 hours  You may not engage in an occupation involving machinery or appliances for rest of today  You may not drink alcoholic beverages for at least 12 hours  Avoid making any critical decisions for at least 24 hour  DIET:   High fiber diet. - however -  remember your colon is empty and a heavy meal will produce gas. Avoid these foods:  vegetables, fried / greasy foods, carbonated drinks for today     ACTIVITY:  You may resume your normal daily activities it is recommended that you spend the remainder of the day resting -  avoid any strenuous activity. CALL M.D. ANY SIGN OF:   Increasing pain, nausea, vomiting  Abdominal distension (swelling)  New increased bleeding (oral or rectal)  Fever (chills)  Pain in chest area  Bloody discharge from nose or mouth   Shortness of breath    Follow-up Instructions:   Call Dr. Katlyn Rojo if any questions or problems. Telephone # [phone]  Biopsy results will be available in  5 to 7 days  Should have a repeat colonoscopy in 5 to 7 years if polyp shows adenoma, will send you a letter in the mail.

## 2020-07-31 NOTE — H&P
Darrel Quevedo M.D.  [phone]            History and Physical       NAME:  Fernando Pickering   :   1960   MRN:   [id number]       Referring Physician:  Dr. Parrish Reyes Date: 2020 9:55 AM    Chief Complaint:  Colon cancer screening    History of Present Illness:  Patient is a 61 y.o. who is seen for colon cancer screening. Denies any ongoing GI complaints. PMH:  Past Medical History:   Diagnosis Date    Anxiety     Desmoid fibromatosis     chest    Dysfunctional uterine bleeding 2011    GERD (gastroesophageal reflux disease)     Hyperlipemia     Hypertension     Insomnia     severe, sleep study normal     Migraine 2012       PSH:  Past Surgical History:   Procedure Laterality Date    CHEST SURGERY PROCEDURE UNLISTED      desmoid tumor    ENDOSCOPY, COLON, DIAGNOSTIC  2010    HX BREAST BIOPSY Right     benign    HX GYN      d and c    HX OTHER SURGICAL      tumor removal in chest removed       Allergies: Allergies   Allergen Reactions    Erythromycin Nausea and Vomiting    Lisinopril Other (comments)     \"margoth achy\"       Home Medications:  Prior to Admission Medications   Prescriptions Last Dose Informant Patient Reported? Taking? Hydrochlorothiazide 100 % powd   Yes No   Sig: Take  by mouth. amitriptyline (ELAVIL) 75 mg tablet   Yes No   Sig: Take  by mouth. donepeziL (ARICEPT) 10 mg tablet 2020  No No   Sig: TAKE 1 TABLET BY MOUTH EVERY DAY AT NIGHT   erenumab-aooe (AIMOVIG AUTOINJECTOR) 70 mg/mL injection Not Taking at Unknown time  No No   Si mL by SubCUTAneous route every thirty (30) days. fluticasone propionate (FLONASE) 50 mcg/actuation nasal spray 2020  No No   Si Sprays by Both Nostrils route daily. losartan (COZAAR) 50 mg tablet 2020  No No   Sig: Take 1 Tab by mouth daily.    montelukast (SINGULAIR) 10 mg tablet Not Taking at Unknown time  No No   Sig: TAKE 1 TABLET BY MOUTH EVERY DAY   multivitamin (ONE A DAY) tablet 7/29/2020  Yes No   Sig: Take 1 Tab by mouth daily. omeprazole (PRILOSEC OTC) 20 mg tablet Unknown at Unknown time  No No   Sig: Take 20 mg by mouth daily. Patient taking differently: Take 20 mg by mouth as needed. rosuvastatin (CRESTOR) 10 mg tablet 7/29/2020  No No   Sig: Take 1 Tab by mouth nightly. vit a,c & e-lutein-minerals (OCUVITE) tablet 7/29/2020  Yes No   Sig: daily. Facility-Administered Medications: None       Hospital Medications:  No current facility-administered medications for this encounter.         Social History:  Social History     Tobacco Use    Smoking status: Never Smoker    Smokeless tobacco: Never Used   Substance Use Topics    Alcohol use: Yes     Comment: rarely       Family History:  Family History   Problem Relation Age of Onset   [facility] Thyroid Disease Mother         goiter    Hypertension Father     Stroke Father     Thyroid Disease Sister         nodules    Hypertension Sister     Cancer Maternal Grandmother         polycythemia    Cancer Paternal Grandmother         breast    Breast Cancer Paternal Grandmother     Cancer Paternal Grandfather         lung cancer    Other Sister         lyme disease    No Known Problems Sister              Review of Systems:      Constitutional: negative fever, negative chills, negative weight loss  Eyes:   negative visual changes  ENT:   negative sore throat, tongue or lip swelling  Respiratory:  negative cough, negative dyspnea  Cards:  negative for chest pain, palpitations, lower extremity edema  GI:   See HPI  :  negative for frequency, dysuria  Integument:  negative for rash and pruritus  Heme:  negative for easy bruising and gum/nose bleeding  Musculoskel: negative for myalgias,  back pain and muscle weakness  Neuro: negative for headaches, dizziness, vertigo  Psych:  negative for feelings of anxiety, depression       Objective:     Patient Vitals for the past 8 hrs:   BP Temp Pulse Resp SpO2 Height Weight   07/31/20 0947 (!) 137/91 97.7 °F (36.5 °C) 93 16 97 % 5' 1.75\" (1.568 m) 88.8 kg (195 lb 12.3 oz)     No intake/output data recorded. No intake/output data recorded. EXAM:     NEURO-a&o   HEENT-wnl   LUNGS-clear    COR-regular rate and rhythym     ABD-soft , no tenderness, no rebound, good bs     EXT-no edema     Data Review     No results for input(s): WBC, HGB, HCT, PLT, HGBEXT, HCTEXT, PLTEXT in the last 72 hours. No results for input(s): NA, K, CL, CO2, BUN, CREA, GLU, PHOS, CA in the last 72 hours. No results for input(s): AP, TBIL, TP, ALB, GLOB, GGT, AML, LPSE in the last 72 hours. No lab exists for component: SGOT, GPT, AMYP, HLPSE  No results for input(s): INR, PTP, APTT, INREXT in the last 72 hours. Patient Active Problem List   Diagnosis Code    Dysfunctional uterine bleeding N93.8    Migraine G43.909    Rhinitis, allergic J30.9    Insomnia G47.00    Essential hypertension I10    Severe obesity (BMI 35.0-39. 9) with comorbidity ([facility] [address] E66.01    Dyslipidemia E78.5    Nuclear cataract [id number]    Pseudophakia of both eyes Z96.1    Pseudophakia of right eye Z96.1    Tear film insufficiency H04.129      Assessment:   · Colon cancer screening   Plan:   · Colonoscopy today.      Signed By: Keary Fothergill, MD     7/31/2020  9:55 AM

## 2020-07-31 NOTE — PROGRESS NOTES
Samaria Noonan  1960  [id number]    Situation:  Verbal report received from:   Codey Ayala RN   Procedure: Procedure(s):  COLONOSCOPY  ENDOSCOPIC POLYPECTOMY    Background:    Preoperative diagnosis: SCREENING  Postoperative diagnosis: diverticulosis  hemorrhoids  ascending colon polyp    :  Dr. Dwight Haider   Assistant(s): Endoscopy Technician-1: Pravin Chong  Endoscopy Technician-2: Ainsley Rosales  Endoscopy RN-1: Leon Guzman RN  Endoscopy RN-2: Anthony Red    Specimens:   ID Type Source Tests Collected by Time Destination   1 : Ascending colon polyp Preservative Colon, Ascending  Urmila Ospina MD 7/31/2020 1029 Pathology     H. Pylori  no    Assessment:  Intra-procedure medications       Anesthesia gave intra-procedure sedation and medications, see anesthesia flow sheet yes    Intravenous fluids: NS@ KVO     Vital signs stable   yes    Abdominal assessment: round and soft   yes    Recommendation:  Discharge patient per MD order  yes.   Return to floor  outpatient  Family or Friend   spouse  Permission to share finding with family or friend yes

## 2020-07-31 NOTE — ANESTHESIA POSTPROCEDURE EVALUATION
Procedure(s):  COLONOSCOPY  ENDOSCOPIC POLYPECTOMY. MAC    Anesthesia Post Evaluation      Multimodal analgesia: multimodal analgesia used between 6 hours prior to anesthesia start to PACU discharge  Patient location during evaluation: bedside  Patient participation: complete - patient participated  Level of consciousness: awake  Pain management: adequate  Airway patency: patent  Anesthetic complications: no  Cardiovascular status: acceptable  Respiratory status: acceptable  Hydration status: acceptable        INITIAL Post-op Vital signs:   Vitals Value Taken Time   /78 7/31/2020 10:58 AM   Temp 36.5 °C (97.7 °F) 7/31/2020 10:43 AM   Pulse 87 7/31/2020 11:00 AM   Resp 16 7/31/2020 11:00 AM   SpO2 95 % 7/31/2020 11:00 AM   Vitals shown include unvalidated device data.

## 2020-07-31 NOTE — PROGRESS NOTES
Discharge instructions given at bedside with the patient. Dr. Woody Foret spoke with the patient at bedside.

## 2020-07-31 NOTE — PROCEDURES
Eula Rodriguez M.D.  [phone]            2020          Colonoscopy Operative Report  Avril Valera  :  1960  [facility] Medical Record Number:  [id number]      Indications:    Screening colonoscopy     :  Kaz Carrion MD    Referring Provider: Moises Morales MD    Sedation:  MAC anesthesia    Pre-Procedural Exam:      Airway: clear,  No airway problems anticipated  Heart: RRR, without gallops or rubs  Lungs: clear bilaterally without wheezes, crackles, or rhonchi  Abdomen: soft, nontender, nondistended, bowel sounds present  Mental Status: awake, alert and oriented to person, place and time     Procedure Details:  After informed consent was obtained with all risks and benefits of procedure explained and preoperative exam completed, the patient was taken to the endoscopy suite and placed in the left lateral decubitus position. Upon sequential sedation as per above, a digital rectal exam was performed. The Olympus videocolonoscope  was inserted in the rectum and carefully advanced to the cecum, which was identified by the ileocecal valve and appendiceal orifice. The quality of preparation was good. The colonoscope was slowly withdrawn with careful inspection and evaluation between folds. Retroflexion in the rectum was performed. Findings:   Terminal Ileum: not intubated  Cecum: normal  Ascending Colon: 1  Sessile polyp(s), the largest 3 mm in size;  Transverse Colon: normal  Descending Colon: normal  Sigmoid: no mucosal lesion appreciated  mild diverticulosis; Rectum: no mucosal lesion appreciated  Grade 1 internal hemorrhoid(s); Interventions:  1 complete polypectomy were performed using cold snare  and the polyps were  retrieved    Specimen Removed:  specimen #1, 3 mm in size, located in the ascending colon removed by cold snare and retrieved for pathology    Complications: None. EBL:  None.     Impression:  One diminutive polyp removed and sent to pathology, otherwise mucosa within normal.                         Mild sigmoid diverticulosis and small internal hemorrhoids    Recommendations:  -If adenoma is present, repeat colonoscopy in 7 years.   -High fiber diet.    -Resume normal medication(s). Discharge Disposition:  Home in the company of a  when able to ambulate.     Sarahy Beck MD  7/31/2020  10:40 AM

## 2020-07-31 NOTE — ANESTHESIA PREPROCEDURE EVALUATION
Relevant Problems   No relevant active problems       Anesthetic History   No history of anesthetic complications            Review of Systems / Medical History  Patient summary reviewed and pertinent labs reviewed    Pulmonary  Within defined limits                 Neuro/Psych         Psychiatric history    Comments: \"memory problems\" Cardiovascular    Hypertension: well controlled          Hyperlipidemia    Exercise tolerance: >4 METS     GI/Hepatic/Renal     GERD           Endo/Other        Morbid obesity     Other Findings              Physical Exam    Airway  Mallampati: II  TM Distance: 4 - 6 cm  Neck ROM: normal range of motion   Mouth opening: Normal     Cardiovascular    Rhythm: regular  Rate: normal         Dental    Dentition: Lower dentition intact and Upper dentition intact     Pulmonary                 Abdominal         Other Findings            Anesthetic Plan    ASA: 2  Anesthesia type: MAC          Induction: Intravenous  Anesthetic plan and risks discussed with: Patient

## 2020-09-08 RX ORDER — DONEPEZIL HYDROCHLORIDE 10 MG/1
TABLET, FILM COATED ORAL
Qty: 90 TAB | Refills: 1 | Status: SHIPPED | OUTPATIENT
Start: 2020-09-08 | End: 2021-08-26 | Stop reason: SDUPTHER

## 2020-09-24 ENCOUNTER — EMPLOYEE WELLNESS (OUTPATIENT)
Dept: FAMILY MEDICINE CLINIC | Age: 60
End: 2020-09-24

## 2020-09-24 LAB
CHOLEST SERPL-MCNC: 197 MG/DL
GLUCOSE SERPL-MCNC: 102 MG/DL (ref 65–100)
HDLC SERPL-MCNC: 49 MG/DL
LDLC SERPL CALC-MCNC: 125 MG/DL (ref 0–100)
TRIGL SERPL-MCNC: 115 MG/DL (ref ?–150)

## 2020-10-15 ENCOUNTER — TELEPHONE (OUTPATIENT)
Dept: INTERNAL MEDICINE CLINIC | Age: 60
End: 2020-10-15

## 2020-10-15 NOTE — TELEPHONE ENCOUNTER
Spoke with patient and she states that she does not need any refills after all. Pt was thankful for the call.

## 2020-10-15 NOTE — TELEPHONE ENCOUNTER
----- Message from Camilla Slaughterjoanna sent at 10/15/2020  8:24 AM EDT -----  Regarding: Dr. Juana Lucero for call: Requested a call back   Callback required yes/no and why: Yes   Best contact number(s): [phone]  Details to clarify the request: Pt would like a refill on her medication and does not  recall the name of the medication she needs the refill for. Pt would like to speak to Dr. Lindsay Montague or her nurse regarding this matter.

## 2020-11-06 ENCOUNTER — OFFICE VISIT (OUTPATIENT)
Dept: INTERNAL MEDICINE CLINIC | Age: 60
End: 2020-11-06
Payer: COMMERCIAL

## 2020-11-06 VITALS
SYSTOLIC BLOOD PRESSURE: 145 MMHG | HEART RATE: 82 BPM | WEIGHT: 201.4 LBS | DIASTOLIC BLOOD PRESSURE: 90 MMHG | OXYGEN SATURATION: 96 % | TEMPERATURE: 97.7 F | RESPIRATION RATE: 20 BRPM | BODY MASS INDEX: 39.54 KG/M2 | HEIGHT: 60 IN

## 2020-11-06 DIAGNOSIS — I10 ESSENTIAL HYPERTENSION: Primary | ICD-10-CM

## 2020-11-06 DIAGNOSIS — E78.2 MIXED HYPERLIPIDEMIA: ICD-10-CM

## 2020-11-06 DIAGNOSIS — Z48.02 VISIT FOR SUTURE REMOVAL: ICD-10-CM

## 2020-11-06 DIAGNOSIS — G43.919 INTRACTABLE MIGRAINE WITHOUT STATUS MIGRAINOSUS, UNSPECIFIED MIGRAINE TYPE: ICD-10-CM

## 2020-11-06 PROCEDURE — 99214 OFFICE O/P EST MOD 30 MIN: CPT | Performed by: INTERNAL MEDICINE

## 2020-11-06 RX ORDER — LOSARTAN POTASSIUM 100 MG/1
100 TABLET ORAL DAILY
Qty: 90 TAB | Refills: 1 | Status: SHIPPED | OUTPATIENT
Start: 2020-11-06 | End: 2021-01-20 | Stop reason: SDUPTHER

## 2020-11-06 NOTE — PROGRESS NOTES
HISTORY OF PRESENT ILLNESS  Yaz Samayoa is a 61 y.o. female. HPI  Hypertension ROS: taking medications as instructed, no medication side effects noted, no TIA's, no chest pain on exertion, no dyspnea on exertion, no swelling of ankles. New concerns: BP in office today is 145/90 and same upon recheck. Continues on losartan. Hyperlipidemia:  Cardiovascular risk analysis - 61 y.o. female LDL goal is under 130. ROS: taking medications as instructed, no medication side effects noted, no TIA's, no chest pain on exertion, no dyspnea on exertion, no swelling of ankles. Tolerating meds, no myalgias or other side effects noted. New concerns: Pt's last LDL was 109.2 on 5/15/20. Continues on Crestor. HA: Stable, pt continues to comply with Elavil. Pt reports that she went to [place] and started experiencing skin lesions on both arms. Pt followed with dermatologist and had a biopsy done and needs the stitches removed today. Pt notes that her father had prostate cancer and passed recently in [place] at age and 80. Review of Systems   All other systems reviewed and are negative. Physical Exam  Constitutional:       Appearance: Normal appearance. HENT:      Right Ear: Hearing, tympanic membrane and external ear normal.      Left Ear: Hearing, tympanic membrane and external ear normal.      Mouth/Throat:      Mouth: Mucous membranes are moist.      Pharynx: Oropharynx is clear. Cardiovascular:      Rate and Rhythm: Normal rate and regular rhythm. Pulses: Normal pulses. Heart sounds: Normal heart sounds. Pulmonary:      Effort: Pulmonary effort is normal.      Breath sounds: Normal breath sounds and air entry. Musculoskeletal: Normal range of motion. Skin:     General: Skin is warm and dry. Neurological:      General: No focal deficit present. Mental Status: She is alert and oriented to person, place, and time.    Psychiatric:         Mood and Affect: Mood normal. Behavior: Behavior normal.         ASSESSMENT and PLAN  Diagnoses and all orders for this visit:    1. Essential hypertension  Pt's BP is elevated in office today. Increased pt's losartan. Asked pt to f/u in 1 month for BP check. Will continue to monitor for improvements or changes. -     losartan (COZAAR) 100 mg tablet; Take 1 Tab by mouth daily. 2. Mixed hyperlipidemia  Stable, patient is tolerating medications, no myalgias. I do not recommend any change in medications. 3. Intractable migraine without status migrainosus, unspecified migraine type  Stable and well-managed. No change in medications. 4. Visit for suture removal  Pt's suture was removed in office today. Lab results and schedule of future lab studies reviewed with patient. Reviewed diet, exercise and weight control. Written by Deann Ta, as dictated by Margaret Crowell MD.     Current diagnosis and concerns discussed with pt at length. Understands risks and benefits or current treatment plan and medications and accepts the treatment and medication with any possible risks. Pt asks appropriate questions which were answered. Pt instructed to call with any concerns or problems.

## 2020-12-02 ENCOUNTER — HOSPITAL ENCOUNTER (OUTPATIENT)
Dept: ULTRASOUND IMAGING | Age: 60
Discharge: HOME | End: 2020-12-02
Attending: INTERNAL MEDICINE
Payer: COMMERCIAL

## 2020-12-02 ENCOUNTER — TELEPHONE (OUTPATIENT)
Dept: INTERNAL MEDICINE CLINIC | Age: 60
End: 2020-12-02

## 2020-12-02 ENCOUNTER — OFFICE VISIT (OUTPATIENT)
Dept: INTERNAL MEDICINE CLINIC | Age: 60
End: 2020-12-02
Attending: INTERNAL MEDICINE
Payer: COMMERCIAL

## 2020-12-02 ENCOUNTER — APPOINTMENT (OUTPATIENT)
Dept: ULTRASOUND IMAGING | Age: 60
End: 2020-12-02
Attending: INTERNAL MEDICINE

## 2020-12-02 VITALS
OXYGEN SATURATION: 97 % | SYSTOLIC BLOOD PRESSURE: 148 MMHG | HEART RATE: 93 BPM | HEIGHT: 61 IN | TEMPERATURE: 98.2 F | BODY MASS INDEX: 37.99 KG/M2 | RESPIRATION RATE: 16 BRPM | WEIGHT: 201.2 LBS | DIASTOLIC BLOOD PRESSURE: 94 MMHG

## 2020-12-02 DIAGNOSIS — M25.561 ACUTE PAIN OF RIGHT KNEE: Primary | ICD-10-CM

## 2020-12-02 DIAGNOSIS — M25.561 ACUTE PAIN OF RIGHT KNEE: ICD-10-CM

## 2020-12-02 PROCEDURE — 76882 US LMTD JT/FCL EVL NVASC XTR: CPT

## 2020-12-02 PROCEDURE — 99214 OFFICE O/P EST MOD 30 MIN: CPT | Performed by: INTERNAL MEDICINE

## 2020-12-02 RX ORDER — CLOBETASOL PROPIONATE 0.5 MG/G
OINTMENT TOPICAL
COMMUNITY
Start: 2020-11-20 | End: 2021-03-26 | Stop reason: CLARIF

## 2020-12-02 RX ORDER — MOMETASONE FUROATE 1 MG/G
OINTMENT TOPICAL
COMMUNITY
Start: 2020-11-20 | End: 2021-03-26

## 2020-12-02 NOTE — TELEPHONE ENCOUNTER
Called patient to advise of providers message below. Patient verbalized understanding and was thankful for the f/u call.

## 2020-12-02 NOTE — TELEPHONE ENCOUNTER
Per PCP request, nurse called [facility] Central Scheduling to schedule patient for non-vascular ultrasound of her right posterior knee where there is some localized swelling & pain x 3 days making it difficult for the patient to walk. Patient works at [employer] & accepted the ultrasound appointment this afternoon (12/2/2020) at 4pm. Patient notified to report to Outpatient Registration at 3:30pm this afternoon with orders. Nurse left patient a voicemail message with details (patient also informed before leaving PCP's office). Per request of central scheduler, nurse faxed a copy of the orders to [facility] [facility] [employer] Outpatient Registration (fax# [phone]).

## 2020-12-02 NOTE — TELEPHONE ENCOUNTER
----- Message from Ronit Herbert MD sent at 78/8/6108  4:09 PM EST -----  Please call the pt and let her know that the ultrasound did not show any abscess or cyst in the area. Symptoms possibly due to muscle strain or an issue with the tendons/ligaments in that area. Recommend using naproxen or ibuprofen or pain. Symptoms should improve over time. If no improvement after a week or if symptoms worsen, let us know.

## 2020-12-02 NOTE — PROGRESS NOTES
Assessment and Plan   Diagnoses and all orders for this visit:    1. Acute pain of right knee  -     US EXT NONVAS RT LTD; Future  Presents to clinic for 3-day history of right posterior knee pain associated with localized swelling that is making it difficult to walk. Difficulty bending knee because of it. Denies any fever, chills. She is concerned about a clot. Denies any underlying arthritis or history of a clot. Unclear etiology. Recommend getting an ultrasound to evaluate further. Differential includes a cyst or abscess. Unlikely to be a clot. Benefits, risks, possible drug interactions, and side effects of all new medications were reviewed with the patient. Pt verbalized understanding. Return to clinic: Pending ultrasound    Ronit Herbert MD  [facility]  12/2/2020    Future Appointments   Date Time Provider Dana Arminda   12/2/2020  4:30 PM [facility] US [address]   Chief Complaint   Right knee pain    Frantz Lorenzo is a 61 y.o. female         Review of Systems   Constitutional: Negative for chills and fever. Respiratory: Negative for shortness of breath. Cardiovascular: Negative for chest pain. Objective   Vitals:       Visit Vitals  BP (!) 148/94 (BP 1 Location: Left arm, BP Patient Position: Sitting)   Pulse 93   Temp 98.2 °F (36.8 °C) (Oral)   Resp 16   Ht 5' 1\" (1.549 m)   Wt 201 lb 3.2 oz (91.3 kg)   LMP 11/07/2010   SpO2 97%   BMI 38.02 kg/m²        Physical Exam  Constitutional:       Appearance: Normal appearance. She is not ill-appearing. Cardiovascular:      Rate and Rhythm: Normal rate. Pulmonary:      Effort: No respiratory distress. Musculoskeletal:      Comments: Localized swelling noted posterior right knee laterally with tenderness to palpation. Limited knee flexion. No lower extremity edema otherwise   Skin:         Neurological:      Mental Status: She is alert.           Current Outpatient Medications   Medication Sig    clobetasoL (TEMOVATE) 0.05 % ointment 1 (ONE) APPLICATION TO ARMS AND HANDS TWICE A DAY FOR 2 WEEKS, THEN 3 TIMES FOR 2 WEEKS, THEN REPEAT    mometasone (ELOCON) 0.1 % ointment 1 (ONE) APPLICATION TWICE A DAY FOR 2 WEEKS, THEN 3 TIMES FOR 2 WEEKS, THEN REPEAT    losartan (COZAAR) 100 mg tablet Take 1 Tab by mouth daily.  donepeziL (ARICEPT) 10 mg tablet TAKE 1 TABLET BY MOUTH EVERY DAY AT NIGHT    rosuvastatin (CRESTOR) 10 mg tablet Take 1 Tab by mouth nightly.  fluticasone propionate (FLONASE) 50 mcg/actuation nasal spray 2 Sprays by Both Nostrils route daily.  multivitamin (ONE A DAY) tablet Take 1 Tab by mouth daily.  vit a,c & e-lutein-minerals (OCUVITE) tablet daily.  omeprazole (PRILOSEC OTC) 20 mg tablet Take 20 mg by mouth daily. (Patient taking differently: Take 20 mg by mouth as needed.)    amitriptyline (ELAVIL) 75 mg tablet Take  by mouth.  Hydrochlorothiazide 100 % powd Take  by mouth. No current facility-administered medications for this visit.

## 2020-12-02 NOTE — PROGRESS NOTES
Please call the pt and let her know that the ultrasound did not show any abscess or cyst in the area. Symptoms possibly due to muscle strain or an issue with the tendons/ligaments in that area. Recommend using naproxen or ibuprofen or pain. Symptoms should improve over time. If no improvement after a week or if symptoms worsen, let us know.

## 2020-12-14 ENCOUNTER — APPOINTMENT (OUTPATIENT)
Dept: VASCULAR SURGERY | Age: 60
End: 2020-12-14
Attending: EMERGENCY MEDICINE
Payer: COMMERCIAL

## 2020-12-14 ENCOUNTER — HOSPITAL ENCOUNTER (EMERGENCY)
Age: 60
Discharge: HOME | End: 2020-12-14
Attending: EMERGENCY MEDICINE | Admitting: EMERGENCY MEDICINE
Payer: COMMERCIAL

## 2020-12-14 ENCOUNTER — APPOINTMENT (OUTPATIENT)
Dept: GENERAL RADIOLOGY | Age: 60
End: 2020-12-14
Attending: EMERGENCY MEDICINE
Payer: COMMERCIAL

## 2020-12-14 VITALS
HEIGHT: 62 IN | RESPIRATION RATE: 17 BRPM | DIASTOLIC BLOOD PRESSURE: 85 MMHG | TEMPERATURE: 97.5 F | HEART RATE: 98 BPM | OXYGEN SATURATION: 96 % | BODY MASS INDEX: 36.8 KG/M2 | WEIGHT: 200 LBS | SYSTOLIC BLOOD PRESSURE: 143 MMHG

## 2020-12-14 DIAGNOSIS — M25.561 ACUTE PAIN OF RIGHT KNEE: Primary | ICD-10-CM

## 2020-12-14 PROCEDURE — 96372 THER/PROPH/DIAG INJ SC/IM: CPT

## 2020-12-14 PROCEDURE — 74011250637 HC RX REV CODE- 250/637: Performed by: EMERGENCY MEDICINE

## 2020-12-14 PROCEDURE — 74011250636 HC RX REV CODE- 250/636: Performed by: EMERGENCY MEDICINE

## 2020-12-14 PROCEDURE — 99283 EMERGENCY DEPT VISIT LOW MDM: CPT

## 2020-12-14 PROCEDURE — 93971 EXTREMITY STUDY: CPT

## 2020-12-14 PROCEDURE — 73562 X-RAY EXAM OF KNEE 3: CPT

## 2020-12-14 RX ORDER — KETOROLAC TROMETHAMINE 30 MG/ML
60 INJECTION, SOLUTION INTRAMUSCULAR; INTRAVENOUS
Status: COMPLETED | OUTPATIENT
Start: 2020-12-14 | End: 2020-12-14

## 2020-12-14 RX ORDER — GABAPENTIN 100 MG/1
300 CAPSULE ORAL
Status: COMPLETED | OUTPATIENT
Start: 2020-12-14 | End: 2020-12-14

## 2020-12-14 RX ORDER — GABAPENTIN 300 MG/1
300 CAPSULE ORAL 3 TIMES DAILY
Qty: 18 CAP | Refills: 0 | Status: SHIPPED | OUTPATIENT
Start: 2020-12-14 | End: 2021-03-22

## 2020-12-14 RX ORDER — KETOROLAC TROMETHAMINE 10 MG/1
10 TABLET, FILM COATED ORAL
Qty: 18 TAB | Refills: 0 | Status: SHIPPED | OUTPATIENT
Start: 2020-12-14 | End: 2021-03-22

## 2020-12-14 RX ADMIN — KETOROLAC TROMETHAMINE 60 MG: 30 INJECTION, SOLUTION INTRAMUSCULAR at 08:29

## 2020-12-14 RX ADMIN — GABAPENTIN 300 MG: 100 CAPSULE ORAL at 08:29

## 2020-12-14 NOTE — LETTER
NOTIFICATION RETURN TO WORK / SCHOOL 
 
12/14/2020 10:17 AM 
 
Ms. Rei Jang [phone] [phone] To Whom It May Concern: 
 
Rei Jang is currently under the care of [address] She will return to work/school on: 12/20/2020 If there are questions or concerns please have the patient contact our office. Sincerely, Artie Bragg NP

## 2020-12-14 NOTE — ED PROVIDER NOTES
HPI patient is a 70-year-old female with past medical history significant for anxiety, desmoid fibromatosis, dysfunctional uterine bleeding, GERD, hyperlipidemia, hypertension, insomnia and migraines who presents to the ED with severe right knee pain for several weeks. Today while walking in from the parking lot she felt like something snapped and the pain has intensified. She was evaluated by her PCP on December 2 for the same complaint without reason found. She has been taking Naprosyn with minimal relief. She has not had any medications today prior to arrival.  She denies any falls, direct injury or blunt trauma. Denies any fever, rash or skin changes. Denies any shortness of breath, difficulty breathing or chest pain.     Past Medical History:   Diagnosis Date    Anxiety     Desmoid fibromatosis     chest    Dysfunctional uterine bleeding 8/17/2011    GERD (gastroesophageal reflux disease)     Hyperlipemia     Hypertension     Insomnia     severe, sleep study normal 2011    Migraine 2/24/2012       Past Surgical History:   Procedure Laterality Date    CHEST SURGERY PROCEDURE UNLISTED      desmoid tumor    COLONOSCOPY N/A 7/31/2020    COLONOSCOPY performed by Margery Skiff, MD at [facility] ENDOSCOPY    ENDOSCOPY, COLON, DIAGNOSTIC  5/2010    HX BREAST BIOPSY Right     benign    HX GYN      d and c    HX OTHER SURGICAL      tumor removal in chest removed         Family History:   Problem Relation Age of Onset    Thyroid Disease Mother         goiter    Hypertension Father     Stroke Father     Thyroid Disease Sister         nodules    Hypertension Sister     Cancer Maternal Grandmother         polycythemia    Cancer Paternal Grandmother         breast    Breast Cancer Paternal Grandmother     Cancer Paternal Grandfather         lung cancer    Other Sister         lyme disease    No Known Problems Sister        Social History     Socioeconomic History    Marital status:      Spouse name: Not on file    Number of children: Not on file    Years of education: Not on file    Highest education level: Not on file   Occupational History    Not on file   Social Needs    Financial resource strain: Not on file    Food insecurity     Worry: Not on file     Inability: Not on file    Transportation needs     Medical: Not on file     Non-medical: Not on file   Tobacco Use    Smoking status: Never Smoker    Smokeless tobacco: Never Used   Substance and Sexual Activity    Alcohol use: Yes     Comment: rarely    Drug use: No    Sexual activity: Yes     Partners: Male   Lifestyle    Physical activity     Days per week: Not on file     Minutes per session: Not on file    Stress: Not on file   Relationships    Social connections     Talks on phone: Not on file     Gets together: Not on file     Attends Hindu service: Not on file     Active member of club or organization: Not on file     Attends meetings of clubs or organizations: Not on file     Relationship status: Not on file    Intimate partner violence     Fear of current or ex partner: Not on file     Emotionally abused: Not on file     Physically abused: Not on file     Forced sexual activity: Not on file   Other Topics Concern    Not on file   Social History Narrative    Not on file         ALLERGIES: Erythromycin and Lisinopril    Review of Systems   Constitutional: Positive for activity change. Negative for appetite change, fever and unexpected weight change. HENT: Negative for congestion, rhinorrhea and trouble swallowing. Respiratory: Negative for cough and shortness of breath. Cardiovascular: Negative for chest pain, palpitations and leg swelling. Gastrointestinal: Negative for abdominal pain, diarrhea, nausea and vomiting. Genitourinary: Negative for dysuria and flank pain. Musculoskeletal: Positive for gait problem. Skin: Negative for rash. Neurological: Negative for headaches.    All other systems reviewed and are negative. Vitals:    12/14/20 0754   BP: (!) 143/85   Pulse: 98   Resp: 17   Temp: 97.5 °F (36.4 °C)   SpO2: 96%   Weight: 90.7 kg (200 lb)   Height: 5' 2\" (1.575 m)            Physical Exam  Vitals signs and nursing note reviewed. Constitutional:       General: She is not in acute distress. Appearance: She is obese. She is not ill-appearing, toxic-appearing or diaphoretic. Comments: Female; ; non smoker; works in the [employer] [address]:      Head: Normocephalic. Nose: Nose normal.   Neck:      Musculoskeletal: Normal range of motion and neck supple. Cardiovascular:      Rate and Rhythm: Normal rate and regular rhythm. Pulmonary:      Effort: Pulmonary effort is normal.      Breath sounds: Normal breath sounds. Musculoskeletal:         General: Tenderness present. No swelling, deformity or signs of injury. Right lower leg: No edema. Left lower leg: No edema. Comments: Reports extreme posterior lateral right knee pain with any active or passive range of motion of the right leg; Skin integrity is intact. There is no obvious bony deformity, bruising, rash or erythema. Good neurovascular sensation. No obvious joint effusion or joint instability. Pain increases with weight bearing; flexion and extension. Skin:     General: Skin is warm and dry. Findings: No rash. Neurological:      General: No focal deficit present. Mental Status: She is alert and oriented to person, place, and time. MDM       Procedures    Xr Knee Rt 3 V    Result Date: 12/14/2020  IMPRESSION: No acute abnormality. Chondromalacia patella    Duplex left lower extremity venous study is negative for DVT. Patient has been reexamined reports slight relief from medications given. Reviewed RICE instructions with the patient. She was instructed in use of crutches to avoid full weightbearing by the RN; good neurovascular sensation before and after the crutch walking.     10:30 AM  Patient's results and  plan of care have been reviewed with the pt and her . Patient and/or family have verbally conveyed their understanding and agreement of the patient's signs, symptoms, diagnosis, treatment and prognosis and additionally agree to follow up as recommended or return to the Emergency Room should her condition change prior to follow-up. Discharge instructions have also been provided to the patient with some educational information regarding her diagnosis as well a list of reasons why she would want to return to the ER prior to her follow-up appointment should her condition change. Abby Beavers, NP

## 2020-12-14 NOTE — DISCHARGE INSTRUCTIONS
Patient Education        Knee Pain or Injury: Care Instructions  Your Care Instructions     Injuries are a common cause of knee problems. Sudden (acute) injuries may be caused by a direct blow to the knee. They can also be caused by abnormal twisting, bending, or falling on the knee. Pain, bruising, or swelling may be severe, and may start within minutes of the injury. Overuse is another cause of knee pain. Other causes are climbing stairs, kneeling, and other activities that use the knee. Everyday wear and tear, especially as you get older, also can cause knee pain. Rest, along with home treatment, often relieves pain and allows your knee to heal. If you have a serious knee injury, you may need tests and treatment. Follow-up care is a key part of your treatment and safety. Be sure to make and go to all appointments, and call your doctor if you are having problems. It's also a good idea to know your test results and keep a list of the medicines you take. How can you care for yourself at home? · Be safe with medicines. Read and follow all instructions on the label. ? If the doctor gave you a prescription medicine for pain, take it as prescribed. ? If you are not taking a prescription pain medicine, ask your doctor if you can take an over-the-counter medicine. · Rest and protect your knee. Take a break from any activity that may cause pain. · Put ice or a cold pack on your knee for 10 to 20 minutes at a time. Put a thin cloth between the ice and your skin. · Prop up a sore knee on a pillow when you ice it or anytime you sit or lie down for the next 3 days. Try to keep it above the level of your heart. This will help reduce swelling. · If your knee is not swollen, you can put moist heat, a heating pad, or a warm cloth on your knee. · If your doctor recommends an elastic bandage, sleeve, or other type of support for your knee, wear it as directed.   · Follow your doctor's instructions about how much weight you can put on your leg. Use a cane, crutches, or a walker as instructed. · Follow your doctor's instructions about activity during your healing process. If you can do mild exercise, slowly increase your activity. · Reach and stay at a healthy weight. Extra weight can strain the joints, especially the knees and hips, and make the pain worse. Losing even a few pounds may help. When should you call for help? Call 911 anytime you think you may need emergency care. For example, call if:    · You have symptoms of a blood clot in your lung (called a pulmonary embolism). These may include:  ? Sudden chest pain. ? Trouble breathing. ? Coughing up blood. Call your doctor now or seek immediate medical care if:    · You have severe or increasing pain.     · Your leg or foot turns cold or changes color.     · You cannot stand or put weight on your knee.     · Your knee looks twisted or bent out of shape.     · You cannot move your knee.     · You have signs of infection, such as:  ? Increased pain, swelling, warmth, or redness. ? Red streaks leading from the knee. ? Pus draining from a place on your knee. ? A fever.     · You have signs of a blood clot in your leg (called a deep vein thrombosis), such as:  ? Pain in your calf, back of the knee, thigh, or groin. ? Redness and swelling in your leg or groin. Watch closely for changes in your health, and be sure to contact your doctor if:    · You have tingling, weakness, or numbness in your knee.     · You have any new symptoms, such as swelling.     · You have bruises from a knee injury that last longer than 2 weeks.     · You do not get better as expected. Where can you learn more? Go to http://www.gray.com/  Enter K195 in the search box to learn more about \"Knee Pain or Injury: Care Instructions. \"  Current as of: June 26, 2019               Content Version: 12.6  © 9480-2863 [employer].    Care instructions adapted under license by [address] (which disclaims liability or warranty for this information). If you have questions about a medical condition or this instruction, always ask your healthcare professional. [address] any warranty or liability for your use of this information. Patient Education        Patellofemoral Pain Syndrome: Care Instructions  Your Care Instructions     Patellofemoral pain syndrome is pain in the front of the knee. It is caused by overuse, weak thigh muscles (quadriceps), or a problem with the way the kneecap moves. Extra weight may also cause this syndrome. The patella is the kneecap, and the femur is the thighbone. Some people may have pain in the front of the knee from a condition called chondromalacia. In this problem, the underside of the knee cartilage wears down and frays. Cartilage is a rubbery tissue that cushions joints. In some cases, the kneecap does not move, or track, in a normal way. You may have knee pain when you run, walk down hills or steps, or do another activity. Sitting for a long time also can cause knee pain. Your knee pain may get better with medicines for pain and swelling and exercises to make your quadriceps stronger. Losing weight, if you need to, may also help with pain. Follow-up care is a key part of your treatment and safety. Be sure to make and go to all appointments, and call your doctor if you are having problems. It's also a good idea to know your test results and keep a list of the medicines you take. How can you care for yourself at home? · Ask your doctor if you can take an over-the-counter pain medicine, such as acetaminophen (Tylenol), ibuprofen (Advil, Motrin), or naproxen (Aleve). Be safe with medicines. Read and follow all instructions on the label. · Rest and protect your knee. Take a break from activities that cause pain, such as long periods of sitting or kneeling.   · Put ice or a cold pack on your knee for 10 to 20 minutes after activity. Put a thin cloth between the ice and your skin. · If your doctor recommends an elastic bandage, sleeve, or other type of support for your knee, wear it as directed. · If your knee is not swollen, you can put moist heat, a heating pad, or a warm cloth on your knee. After several days of rest, you can begin gentle exercise of your knee. · Reach and stay at a healthy weight. Being overweight puts stress on your knees. · Wear athletic shoes that offer good support, especially if you run. · Use shoe inserts, or orthotics, if they help reduce your knee pain. Many drugstores and shoe stores sell them. · See a physical therapist to learn more exercises and stretches to make your legs stronger. When should you call for help? Watch closely for changes in your health, and be sure to contact your doctor if:    · Your knee pain does not get better or gets worse. Where can you learn more? Go to http://www.gray.com/  Enter Q542 in the search box to learn more about \"Patellofemoral Pain Syndrome: Care Instructions. \"  Current as of: March 2, 2020               Content Version: 12.6  © 4512-7035 [employer]. Care instructions adapted under license by [employer] (which disclaims liability or warranty for this information). If you have questions about a medical condition or this instruction, always ask your healthcare professional. [address] any warranty or liability for your use of this information.

## 2020-12-14 NOTE — ED TRIAGE NOTES
Pt states that she has had pain behind her right knee for a couple of weeks. Pt states that she has had an ultrasound and saw her PCP and states that they didn't find anything.

## 2020-12-15 ENCOUNTER — TELEPHONE (OUTPATIENT)
Dept: INTERNAL MEDICINE CLINIC | Age: 60
End: 2020-12-15

## 2020-12-15 RX ORDER — CYCLOBENZAPRINE HCL 10 MG
10 TABLET ORAL
Qty: 10 TAB | Refills: 0 | Status: SHIPPED | OUTPATIENT
Start: 2020-12-15 | End: 2021-03-22

## 2020-12-15 NOTE — TELEPHONE ENCOUNTER
Spoke with patient and advised her that Dr. Jyotsna Staley sent in a rx for some muscle relaxers. Pt states that she has an appt [place] with ortho on Thursday. Pt understood and was thankful for the call.

## 2020-12-15 NOTE — TELEPHONE ENCOUNTER
----- Message from Yang Collado sent at 12/15/2020  9:24 AM EST -----  Regarding: Dr. Addis Vazquez telephone  General Message/Vendor Calls    Caller's first and last name: pt       Reason for call: pt was seen at [facility] yesterday due to leg pain. Pt is requesting to speak with provider or nurse about getting started on a muscle relaxer to help with her sx. Pt states the ER doctor started her on nerve pain medication but she does not think that will help. Pt declined scheduling appt.        Callback required yes/no and why: yes       Best contact number(s): [phone]  or [phone]      Details to clarify the request: jalen Collado

## 2020-12-18 ENCOUNTER — TRANSCRIBE ORDER (OUTPATIENT)
Dept: SCHEDULING | Age: 60
End: 2020-12-18

## 2020-12-18 DIAGNOSIS — M23.300 DEGENERATIVE TEAR OF LATERAL MENISCUS OF RIGHT KNEE: Primary | ICD-10-CM

## 2020-12-18 DIAGNOSIS — M94.261 CHONDROMALACIA OF RIGHT KNEE: ICD-10-CM

## 2020-12-23 ENCOUNTER — HOSPITAL ENCOUNTER (OUTPATIENT)
Dept: MRI IMAGING | Age: 60
Discharge: HOME | End: 2020-12-23
Attending: ORTHOPAEDIC SURGERY
Payer: COMMERCIAL

## 2020-12-23 DIAGNOSIS — M23.300 DEGENERATIVE TEAR OF LATERAL MENISCUS OF RIGHT KNEE: ICD-10-CM

## 2020-12-23 DIAGNOSIS — M94.261 CHONDROMALACIA OF RIGHT KNEE: ICD-10-CM

## 2020-12-23 PROCEDURE — 73721 MRI JNT OF LWR EXTRE W/O DYE: CPT

## 2021-01-20 DIAGNOSIS — I10 ESSENTIAL HYPERTENSION: ICD-10-CM

## 2021-01-20 RX ORDER — LOSARTAN POTASSIUM 100 MG/1
100 TABLET ORAL DAILY
Qty: 90 TAB | Refills: 1 | Status: SHIPPED | OUTPATIENT
Start: 2021-01-20 | End: 2021-04-11 | Stop reason: SDUPTHER

## 2021-01-20 NOTE — TELEPHONE ENCOUNTER
----- Message from Angel Cortes sent at 1/20/2021  9:19 AM EST -----  Regarding: Refill  Medication Refill    Caller (if not patient): Self       Relationship of caller (if not patient): self       Best contact number(s):  [phone]      Name of medication and dosage if known: losartan (COZAAR) 100 mg tablet       Is patient out of this medication (yes/no): yes       Pharmacy name: [facility] at [address] listed in chart? (yes/no):  No   Pharmacy phone [phone]      Details to clarify the request: n/a       Angel Cortes

## 2021-02-19 ENCOUNTER — TRANSCRIBE ORDER (OUTPATIENT)
Dept: SCHEDULING | Age: 61
End: 2021-02-19

## 2021-02-19 DIAGNOSIS — Z12.31 SCREENING MAMMOGRAM FOR HIGH-RISK PATIENT: Primary | ICD-10-CM

## 2021-03-22 ENCOUNTER — OFFICE VISIT (OUTPATIENT)
Dept: NEUROLOGY | Age: 61
End: 2021-03-22
Payer: COMMERCIAL

## 2021-03-22 ENCOUNTER — OFFICE VISIT (OUTPATIENT)
Dept: INTERNAL MEDICINE CLINIC | Age: 61
End: 2021-03-22

## 2021-03-22 VITALS
DIASTOLIC BLOOD PRESSURE: 93 MMHG | HEIGHT: 61 IN | BODY MASS INDEX: 36.59 KG/M2 | OXYGEN SATURATION: 96 % | RESPIRATION RATE: 20 BRPM | SYSTOLIC BLOOD PRESSURE: 146 MMHG | TEMPERATURE: 97.6 F | HEART RATE: 98 BPM | WEIGHT: 193.8 LBS

## 2021-03-22 VITALS
BODY MASS INDEX: 36.66 KG/M2 | OXYGEN SATURATION: 98 % | DIASTOLIC BLOOD PRESSURE: 88 MMHG | SYSTOLIC BLOOD PRESSURE: 118 MMHG | WEIGHT: 194 LBS | HEART RATE: 95 BPM

## 2021-03-22 DIAGNOSIS — G31.84 MCI (MILD COGNITIVE IMPAIRMENT): Primary | ICD-10-CM

## 2021-03-22 DIAGNOSIS — E78.2 MIXED HYPERLIPIDEMIA: ICD-10-CM

## 2021-03-22 DIAGNOSIS — F90.0 ATTENTION DEFICIT HYPERACTIVITY DISORDER (ADHD), PREDOMINANTLY INATTENTIVE TYPE: ICD-10-CM

## 2021-03-22 DIAGNOSIS — I10 ESSENTIAL HYPERTENSION: ICD-10-CM

## 2021-03-22 DIAGNOSIS — G47.00 INSOMNIA, UNSPECIFIED TYPE: ICD-10-CM

## 2021-03-22 DIAGNOSIS — R41.3 MEMORY LOSS: Primary | ICD-10-CM

## 2021-03-22 DIAGNOSIS — J45.20 MILD INTERMITTENT REACTIVE AIRWAY DISEASE WITHOUT COMPLICATION: ICD-10-CM

## 2021-03-22 PROCEDURE — 99214 OFFICE O/P EST MOD 30 MIN: CPT | Performed by: INTERNAL MEDICINE

## 2021-03-22 PROCEDURE — 99215 OFFICE O/P EST HI 40 MIN: CPT | Performed by: NURSE PRACTITIONER

## 2021-03-22 RX ORDER — DEXTROAMPHETAMINE SACCHARATE, AMPHETAMINE ASPARTATE, DEXTROAMPHETAMINE SULFATE AND AMPHETAMINE SULFATE 5; 5; 5; 5 MG/1; MG/1; MG/1; MG/1
20 TABLET ORAL DAILY
Qty: 30 TAB | Refills: 0 | Status: SHIPPED | OUTPATIENT
Start: 2021-03-22 | End: 2021-03-26

## 2021-03-22 RX ORDER — MONTELUKAST SODIUM 10 MG/1
10 TABLET ORAL
COMMUNITY
End: 2021-04-16 | Stop reason: SDUPTHER

## 2021-03-22 RX ORDER — FLUTICASONE PROPIONATE AND SALMETEROL 250; 50 UG/1; UG/1
POWDER RESPIRATORY (INHALATION)
COMMUNITY
Start: 2021-03-12 | End: 2021-03-26

## 2021-03-22 RX ORDER — HYDROCHLOROTHIAZIDE 25 MG/1
25 TABLET ORAL DAILY
Qty: 90 TAB | Refills: 1 | Status: SHIPPED | OUTPATIENT
Start: 2021-03-22

## 2021-03-22 RX ORDER — ALBUTEROL SULFATE 90 UG/1
2 AEROSOL, METERED RESPIRATORY (INHALATION)
COMMUNITY
Start: 2021-03-19

## 2021-03-22 RX ORDER — TRAZODONE HYDROCHLORIDE 50 MG/1
100 TABLET ORAL
Qty: 60 TAB | Refills: 5 | Status: SHIPPED | OUTPATIENT
Start: 2021-03-22 | End: 2021-04-16

## 2021-03-22 NOTE — PATIENT INSTRUCTIONS
Try  Adderall 1/2 to 1 tablet daily in the morning to help with attention, focusing, concentration. Try Trazodone 1-2 tablets before bed for sleep, anxiety.

## 2021-03-22 NOTE — PROGRESS NOTES
Ebony Barriga is a 61 y.o. female who presents with the following  Chief Complaint   Patient presents with    Follow-up    Migraine     better    Memory Loss       HPI    FU for migraines, memory loss. Migraines are better. Not bothersome right now. Her bigger concern is her memory. She has noticed decline and so has her . Work has given her a fit to work evaluation that needs to be filled out. 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 She can remember how to do her job without any issues. Anything new is tough to grasp. Also has trouble with multi-tasking, remembering certain questions that were asked of her. Seems her boss has noticed and sparked the memory concerns. She is currently not working. She noticed cognitive issues with day to day remembering, focusing. Asking same questions a lot. Telling same stories, etc. Per herself and her . She is not sleeping well   She is anxious during the day also. Sleep makes this worse  Her brain runs at night. She is driving without any issues. No problems functioning at home either. Just repeating herself a lot. No depression. Allergies   Allergen Reactions    Erythromycin Nausea and Vomiting    Lisinopril Other (comments)     \"margoth achy\"       Current Outpatient Medications   Medication Sig    hydroCHLOROthiazide (HYDRODIURIL) 25 mg tablet Take 1 Tab by mouth daily.  albuterol (PROVENTIL HFA, VENTOLIN HFA, PROAIR HFA) 90 mcg/actuation inhaler     Wixela Inhub 250-50 mcg/dose diskus inhaler     montelukast (SINGULAIR) 10 mg tablet     traZODone (DESYREL) 50 mg tablet Take 2 Tabs by mouth nightly.  dextroamphetamine-amphetamine (ADDERALL) 20 mg tablet Take 1 Tab by mouth daily. Max Daily Amount: 20 mg.    losartan (COZAAR) 100 mg tablet Take 1 Tab by mouth daily.     clobetasoL (TEMOVATE) 0.05 % ointment 1 (ONE) APPLICATION TO ARMS AND HANDS TWICE A DAY FOR 2 WEEKS, THEN 3 TIMES FOR 2 WEEKS, THEN REPEAT  mometasone (ELOCON) 0.1 % ointment 1 (ONE) APPLICATION TWICE A DAY FOR 2 WEEKS, THEN 3 TIMES FOR 2 WEEKS, THEN REPEAT    donepeziL (ARICEPT) 10 mg tablet TAKE 1 TABLET BY MOUTH EVERY DAY AT NIGHT    rosuvastatin (CRESTOR) 10 mg tablet Take 1 Tab by mouth nightly.  multivitamin (ONE A DAY) tablet Take 1 Tab by mouth daily.  omeprazole (PRILOSEC OTC) 20 mg tablet Take 20 mg by mouth daily. (Patient taking differently: Take 20 mg by mouth as needed.)    amitriptyline (ELAVIL) 75 mg tablet Take  by mouth.  Hydrochlorothiazide 100 % powd Take  by mouth. No current facility-administered medications for this visit.         Social History     Tobacco Use   Smoking Status Never Smoker   Smokeless Tobacco Never Used       Past Medical History:   Diagnosis Date    Anxiety     Desmoid fibromatosis     chest    Dysfunctional uterine bleeding 8/17/2011    GERD (gastroesophageal reflux disease)     Hyperlipemia     Hypertension     Insomnia     severe, sleep study normal 2011    Migraine 2/24/2012       Past Surgical History:   Procedure Laterality Date    COLONOSCOPY N/A 7/31/2020    COLONOSCOPY performed by [facility]th [facility]hea [facility] at [facility] ENDOSCOPY    ENDOSCOPY, COLON, DIAGNOSTIC  5/2010    HX BREAST BIOPSY Right     benign    HX GYN      d and c    HX OTHER SURGICAL      tumor removal in chest removed    TX CHEST SURGERY PROCEDURE UNLISTED      desmoid tumor       Family History   Problem Relation Age of Onset    Thyroid Disease Mother         goiter    Hypertension Father     Stroke Father     Thyroid Disease Sister         nodules    Hypertension Sister     Cancer Maternal Grandmother         polycythemia    Cancer Paternal Grandmother         breast    Breast Cancer Paternal Grandmother     Cancer Paternal Grandfather         lung cancer    Other Sister         lyme disease    No Known Problems Sister        Social History     Socioeconomic History    Marital status:      Spouse name: Not on file    Number of children: Not on file    Years of education: Not on file    Highest education level: Not on file   Tobacco Use    Smoking status: Never Smoker    Smokeless tobacco: Never Used   Substance and Sexual Activity    Alcohol use: Yes     Comment: rarely    Drug use: No    Sexual activity: Yes     Partners: Male       Review of Systems   Eyes: Positive for blurred vision and photophobia. Negative for double vision. Respiratory: Negative for shortness of breath and wheezing. Cardiovascular: Negative for chest pain and palpitations. Gastrointestinal: Negative for nausea and vomiting. Neurological: Positive for headaches. Negative for dizziness, tingling, tremors, sensory change and speech change. Psychiatric/Behavioral: Positive for memory loss. Negative for hallucinations, substance abuse and suicidal ideas. The patient is nervous/anxious and has insomnia. Remainder of comprehensive review is negative. Physical Exam :    Visit Vitals  /88   Pulse 95   Wt 88 kg (194 lb)   LMP 11/07/2010   SpO2 98%   BMI 36.66 kg/m²       General: Well defined, nourished, and groomed individual in no acute distress.    Neck: Supple, nontender, no bruits, no pain with resistance to active range of motion.    Heart: Regular rate and rhythm, no murmurs, rub, or gallop. Normal S1S2. Lungs: Clear to auscultation bilaterally with equal chest expansion, no cough, no wheeze  Musculoskeletal: Extremities revealed no edema and had full range of motion of joints.    Psych: Good mood and bright affect    NEUROLOGICAL EXAMINATION:    Mental Status: Alert and oriented to person, place, and time    Cranial Nerves:    II, III, IV, VI: Visual acuity grossly intact. Visual fields are normal.    Pupils are equal, round, and reactive to light and accommodation.    Extra-ocular movements are full and fluid. Fundoscopic exam was benign, no ptosis or nystagmus.    V-XII: Hearing is grossly intact. Facial features are symmetric, with normal sensation and strength. The palate rises symmetrically and the tongue protrudes midline. Sternocleidomastoids 5/5. Motor Examination: Normal tone, bulk, and strength, 5/5 muscle strength throughout. Coordination: Finger to nose was normal. No resting or intention tremor    Gait and Station: Steady while walking. Normal arm swing. No pronator drift. No muscle wasting or fasiculations noted. Reflexes: DTRs 2+ throughout. Results for orders placed or performed in visit on 09/24/20   [facility] SCREEN   Result Value Ref Range    Glucose 102 (H) 65 - 100 mg/dL    Cholesterol, total 197 <200 MG/DL    HDL Cholesterol 49 MG/DL    LDL, calculated 125 (H) 0 - 100 MG/DL    Triglyceride 115 <150 MG/DL       Orders Placed This Encounter    MRI BRAIN WO CONT     Standing Status:   Future     Standing Expiration Date:   4/22/2022    REFERRAL TO NEUROPSYCHOLOGY     Referral Priority:   Routine     Referral Type:   Consultation     Referral Reason:   Specialty Services Required     Referred to Provider:   Walt Kidd PsyD     Number of Visits Requested:   1    EEG AMB NEURO     Order Specific Question:   Reason for Exam:     Answer:   memory loss    DUPLEX CAROTID BILATERAL     Standing Status:   Future     Standing Expiration Date:   9/22/2021    albuterol (PROVENTIL HFA, VENTOLIN HFA, PROAIR HFA) 90 mcg/actuation inhaler    Wixela Inhub 250-50 mcg/dose diskus inhaler    montelukast (SINGULAIR) 10 mg tablet    traZODone (DESYREL) 50 mg tablet     Sig: Take 2 Tabs by mouth nightly. Dispense:  60 Tab     Refill:  5    dextroamphetamine-amphetamine (ADDERALL) 20 mg tablet     Sig: Take 1 Tab by mouth daily. Max Daily Amount: 20 mg. Dispense:  30 Tab     Refill:  0       1. Memory loss    2. Attention deficit hyperactivity disorder (ADHD), predominantly inattentive type    3.  Insomnia, unspecified type      MRI brain to rule out stroke, lesion, atrophy. EEG To look at epilepsy, slowing. Doppler to look at stenosis. Adderall to help with day to day concentration, focusing, on previous neuropsych testing. Vyvanse was denied. Try Trazodone for sleep at night also. Discussed side effects of all. Refer to neuropsych to evaluate memory further.                This note will not be viewable in [facility]

## 2021-03-22 NOTE — PROGRESS NOTES
Rossi Shen (: 1960) is a 61 y.o. female, established patient, here for evaluation of the following chief complaint(s):  Follow-up (Respiratory illness)       ASSESSMENT/PLAN:  1. MCI (mild cognitive impairment)  Not at goal. Discussed with pt that she may need to be reevaluated by Dr. Ya Perez if she has noticed significant change in memory. Reminded pt that her last memory testing showed attention deficit so taking appropriate meds could help with some of the difficulty with focusing and concentration she has been facing. Informed pt that she will need to first follow with neurologist who has been chronically following her for med management and evaluation. Will continue to monitor for improvements or changes. 2. Essential hypertension  BP is not at goal. Put pt back on HCTZ since she tolerated it well in the past. Will continue to monitor for improvements or changes. -     hydroCHLOROthiazide (HYDRODIURIL) 25 mg tablet; Take 1 Tab by mouth daily. , Normal, Disp-90 Tab, R-1    3. Mixed hyperlipidemia  Stable, patient is tolerating medications, no myalgias. I do not recommend any change in Crestor. Followed by Dr. Zulma Lehman. Not at goal with chronic drainage and recurring respiratory issues reported. Discussed with pt that Singulair helps address allergy component of asthma since she has chronic drainage which is triggering respiratory issues. Had conversation with pt that further allergy testing may qualify her for injections. Informed pt that injections, like Fasenra, decreases eosinophil count to address allergic component of asthma if inhalers do not provide relief. Will continue to monitor for improvements or changes. SUBJECTIVE/OBJECTIVE:  HPI  Pt reports that her typical chronic drainage in the winter worsened and she then followed at [facility] and was dx with pneumonia and bronchitis. She notes that she then followed with Dr. Sophia Brooks and was Rx inhalers and Singulair.      MCI: Pt reports that her memory has declined. She notes that has been evaluated by neurologist (Dr. Rody Camp) and Rx Aricept. She states that she has not been able to work due to change in memory. Continues on Aricept. She notes that she is not taking ADD meds. Pt reports that she has ongoing insomnia. HA: Pt reports that she is not taking amitriptyline since her HA have decreased in frequency. Her  notes that her HA have been increasing in frequency this last month. She notes that her HA now are less intense but accompanied by nausea. Hypertension ROS: no TIA's, no chest pain on exertion, no dyspnea on exertion, no swelling of ankles. New concerns: BP in office today is 146/93. Hyperlipidemia:  Cardiovascular risk analysis - 61 y.o. female LDL goal is under 130. ROS: taking medications as instructed, no medication side effects noted, no TIA's, no chest pain on exertion, no dyspnea on exertion, no swelling of ankles. Tolerating meds, no myalgias or other side effects noted. New concerns: Pt's last LDL was 109.2 on 5/15/20. Review of Systems   All other systems reviewed and are negative. Physical Exam  Constitutional:       Appearance: Normal appearance. HENT:      Right Ear: Tympanic membrane and external ear normal.      Left Ear: Tympanic membrane and external ear normal.      Mouth/Throat:      Mouth: Mucous membranes are moist.      Pharynx: Oropharynx is clear. Cardiovascular:      Rate and Rhythm: Normal rate and regular rhythm. Pulses: Normal pulses. Heart sounds: Normal heart sounds. Pulmonary:      Effort: Pulmonary effort is normal.      Breath sounds: Normal breath sounds. Musculoskeletal: Normal range of motion. Skin:     General: Skin is warm and dry. Neurological:      General: No focal deficit present. Mental Status: She is alert and oriented to person, place, and time.    Psychiatric:         Mood and Affect: Mood normal.         Behavior: Behavior normal.           On this date 03/22/2021 I have spent 30 minutes reviewing previous notes, test results and face to face with the patient discussing the diagnosis and importance of compliance with the treatment plan as well as documenting on the day of the visit. Lab results and schedule of future lab studies reviewed with patient. Reviewed diet, exercise and weight control. Written by Jagdeep Daniels, as dictated by Leon Ellison MD.     Current diagnosis and concerns discussed with pt at length. Understands risks and benefits or current treatment plan and medications and accepts the treatment and medication with any possible risks. Pt asks appropriate questions which were answered. Pt instructed to call with any concerns or problems.

## 2021-03-26 ENCOUNTER — APPOINTMENT (OUTPATIENT)
Dept: CT IMAGING | Age: 61
End: 2021-03-26
Attending: EMERGENCY MEDICINE
Payer: COMMERCIAL

## 2021-03-26 ENCOUNTER — HOSPITAL ENCOUNTER (EMERGENCY)
Age: 61
Discharge: HOME | End: 2021-03-26
Attending: EMERGENCY MEDICINE
Payer: COMMERCIAL

## 2021-03-26 VITALS
HEART RATE: 88 BPM | HEIGHT: 62 IN | DIASTOLIC BLOOD PRESSURE: 79 MMHG | BODY MASS INDEX: 33.13 KG/M2 | TEMPERATURE: 97.1 F | WEIGHT: 180 LBS | SYSTOLIC BLOOD PRESSURE: 115 MMHG | OXYGEN SATURATION: 95 % | RESPIRATION RATE: 15 BRPM

## 2021-03-26 DIAGNOSIS — R07.9 CHEST PAIN, UNSPECIFIED TYPE: ICD-10-CM

## 2021-03-26 DIAGNOSIS — R06.02 SOB (SHORTNESS OF BREATH): Primary | ICD-10-CM

## 2021-03-26 LAB
ALBUMIN SERPL-MCNC: 4.1 G/DL (ref 3.5–5)
ALBUMIN/GLOB SERPL: 1.1 {RATIO} (ref 1.1–2.2)
ALP SERPL-CCNC: 83 U/L (ref 45–117)
ALT SERPL-CCNC: 50 U/L (ref 12–78)
ANION GAP SERPL CALC-SCNC: 6 MMOL/L (ref 5–15)
ARTERIAL PATENCY WRIST A: YES
AST SERPL-CCNC: 22 U/L (ref 15–37)
BASE EXCESS BLDA CALC-SCNC: 0.6 MMOL/L
BASOPHILS # BLD: 0 K/UL (ref 0–0.1)
BASOPHILS NFR BLD: 0 % (ref 0–1)
BDY SITE: ABNORMAL
BILIRUB SERPL-MCNC: 0.9 MG/DL (ref 0.2–1)
BNP SERPL-MCNC: 14 PG/ML
BUN SERPL-MCNC: 16 MG/DL (ref 6–20)
BUN/CREAT SERPL: 16 (ref 12–20)
CALCIUM SERPL-MCNC: 9.7 MG/DL (ref 8.5–10.1)
CHLORIDE SERPL-SCNC: 103 MMOL/L (ref 97–108)
CO2 SERPL-SCNC: 27 MMOL/L (ref 21–32)
COMMENT, HOLDF: NORMAL
CREAT SERPL-MCNC: 1.03 MG/DL (ref 0.55–1.02)
DIFFERENTIAL METHOD BLD: ABNORMAL
EOSINOPHIL # BLD: 0.1 K/UL (ref 0–0.4)
EOSINOPHIL NFR BLD: 1 % (ref 0–7)
ERYTHROCYTE [DISTWIDTH] IN BLOOD BY AUTOMATED COUNT: 12.7 % (ref 11.5–14.5)
GLOBULIN SER CALC-MCNC: 3.8 G/DL (ref 2–4)
GLUCOSE SERPL-MCNC: 93 MG/DL (ref 65–100)
HCO3 BLDA-SCNC: 23 MMOL/L (ref 22–26)
HCT VFR BLD AUTO: 47.9 % (ref 35–47)
HGB BLD-MCNC: 15.9 G/DL (ref 11.5–16)
IMM GRANULOCYTES # BLD AUTO: 0 K/UL (ref 0–0.04)
IMM GRANULOCYTES NFR BLD AUTO: 1 % (ref 0–0.5)
LYMPHOCYTES # BLD: 1.3 K/UL (ref 0.8–3.5)
LYMPHOCYTES NFR BLD: 18 % (ref 12–49)
MCH RBC QN AUTO: 28.7 PG (ref 26–34)
MCHC RBC AUTO-ENTMCNC: 33.2 G/DL (ref 30–36.5)
MCV RBC AUTO: 86.5 FL (ref 80–99)
MONOCYTES # BLD: 0.7 K/UL (ref 0–1)
MONOCYTES NFR BLD: 9 % (ref 5–13)
NEUTS SEG # BLD: 5.1 K/UL (ref 1.8–8)
NEUTS SEG NFR BLD: 71 % (ref 32–75)
NRBC # BLD: 0 K/UL (ref 0–0.01)
NRBC BLD-RTO: 0 PER 100 WBC
PCO2 BLDA: 32 MMHG (ref 35–45)
PH BLDA: 7.48 [PH] (ref 7.35–7.45)
PLATELET # BLD AUTO: 222 K/UL (ref 150–400)
PMV BLD AUTO: 10.5 FL (ref 8.9–12.9)
PO2 BLDA: 77 MMHG (ref 80–100)
POTASSIUM SERPL-SCNC: 3.4 MMOL/L (ref 3.5–5.1)
PROT SERPL-MCNC: 7.9 G/DL (ref 6.4–8.2)
RBC # BLD AUTO: 5.54 M/UL (ref 3.8–5.2)
SAMPLES BEING HELD,HOLD: NORMAL
SAO2 % BLD: 96 % (ref 92–97)
SAO2% DEVICE SAO2% SENSOR NAME: ABNORMAL
SODIUM SERPL-SCNC: 136 MMOL/L (ref 136–145)
SPECIMEN SITE: ABNORMAL
TROPONIN I SERPL-MCNC: <0.05 NG/ML
WBC # BLD AUTO: 7.2 K/UL (ref 3.6–11)

## 2021-03-26 PROCEDURE — 83880 ASSAY OF NATRIURETIC PEPTIDE: CPT

## 2021-03-26 PROCEDURE — 99284 EMERGENCY DEPT VISIT MOD MDM: CPT

## 2021-03-26 PROCEDURE — 36415 COLL VENOUS BLD VENIPUNCTURE: CPT

## 2021-03-26 PROCEDURE — 36600 WITHDRAWAL OF ARTERIAL BLOOD: CPT

## 2021-03-26 PROCEDURE — 74011000636 HC RX REV CODE- 636: Performed by: EMERGENCY MEDICINE

## 2021-03-26 PROCEDURE — 85025 COMPLETE CBC W/AUTO DIFF WBC: CPT

## 2021-03-26 PROCEDURE — 71275 CT ANGIOGRAPHY CHEST: CPT

## 2021-03-26 PROCEDURE — 82803 BLOOD GASES ANY COMBINATION: CPT

## 2021-03-26 PROCEDURE — 80053 COMPREHEN METABOLIC PANEL: CPT

## 2021-03-26 PROCEDURE — 84484 ASSAY OF TROPONIN QUANT: CPT

## 2021-03-26 RX ORDER — OMEPRAZOLE 20 MG/1
20 CAPSULE, DELAYED RELEASE ORAL
COMMUNITY

## 2021-03-26 RX ORDER — CLOBETASOL PROPIONATE 0.5 MG/G
OINTMENT TOPICAL
COMMUNITY

## 2021-03-26 RX ORDER — FLUTICASONE PROPIONATE 50 MCG
2 SPRAY, SUSPENSION (ML) NASAL
COMMUNITY

## 2021-03-26 RX ORDER — FLUTICASONE PROPIONATE AND SALMETEROL 250; 50 UG/1; UG/1
1 POWDER RESPIRATORY (INHALATION) EVERY 12 HOURS
COMMUNITY

## 2021-03-26 RX ORDER — ASCORBIC ACID 500 MG
500 TABLET ORAL DAILY
COMMUNITY

## 2021-03-26 RX ORDER — MOMETASONE FUROATE 1 MG/G
OINTMENT TOPICAL
COMMUNITY

## 2021-03-26 RX ADMIN — IOPAMIDOL 100 ML: 755 INJECTION, SOLUTION INTRAVENOUS at 17:16

## 2021-03-26 NOTE — ED TRIAGE NOTES
Pt sent by [facility] for SOB. Pt reports SOB for months and has been using inhaler with relief. States last week SOB started to get worse and is now having chest pain. Pt  post ambulation with O2 sats 97%.

## 2021-03-26 NOTE — PROGRESS NOTES
[facility]: MED RECONCILIATION    Comments/Recommendations:   Prior to admission medication list updated per telephone conversation with patient in ER room 13. Patient confirmed name and date of birth. Preferred pharmacy is [address] [place] [address] on [employer]. Allergies: Erythromycin; Lisinopril; and Other plant, animal, environmental    Prior to Admission Medications   Prescriptions Last Dose Informant Patient Reported? Taking? albuterol (PROVENTIL HFA, VENTOLIN HFA, PROAIR HFA) 90 mcg/actuation inhaler 3/26/2021 at Unknown time Self Yes Yes   Sig: Take 2 Puffs by inhalation every four (4) hours as needed for Shortness of Breath. ascorbic acid, vitamin C, (VITAMIN C) 500 mg tablet 3/26/2021 at Unknown time Self Yes Yes   Sig: Take 500 mg by mouth daily. clobetasoL (Temovate) 0.05 % ointment 3/25/2021 at Unknown time Self Yes Yes   Sig: Apply  to affected area nightly. To arms and hands   donepeziL (ARICEPT) 10 mg tablet 3/25/2021 at Unknown time Self No Yes   Sig: TAKE 1 TABLET BY MOUTH EVERY DAY AT NIGHT   fluticasone propion-salmeteroL (Wixela Inhub) 250-50 mcg/dose diskus inhaler 3/24/2021 at Unknown time Self Yes Yes   Sig: Take 1 Puff by inhalation every twelve (12) hours. fluticasone propionate (Flonase Allergy Relief) 50 mcg/actuation nasal spray  Self Yes Yes   Si Sprays by Both Nostrils route daily as needed for Allergies. hydroCHLOROthiazide (HYDRODIURIL) 25 mg tablet 3/26/2021 at Unknown time Self No Yes   Sig: Take 1 Tab by mouth daily. losartan (COZAAR) 100 mg tablet 3/26/2021 at Unknown time Self No Yes   Sig: Take 1 Tab by mouth daily. mometasone (Elocon) 0.1 % ointment  Self Yes Yes   Sig: Apply  to affected area nightly. montelukast (SINGULAIR) 10 mg tablet 3/25/2021 at Unknown time Self Yes Yes   Sig: Take 10 mg by mouth nightly. omeprazole (PRILOSEC) 20 mg capsule 3/25/2021 at Unknown time Self Yes Yes   Sig: Take 20 mg by mouth nightly.    rosuvastatin (CRESTOR) 10 mg tablet 3/25/2021 at Unknown time Self No Yes   Sig: Take 1 Tab by mouth nightly. traZODone (DESYREL) 50 mg tablet 3/25/2021 at Unknown time Self No Yes   Sig: Take 2 Tabs by mouth nightly. vitamin K-G-Y-lutein-minerals (OCUVITE) tablet 3/25/2021 at Unknown time Self Yes Yes   Sig: Take 1 Tab by mouth nightly.         Deborah Wise, Pharmacist

## 2021-03-26 NOTE — ED NOTES
MD Sheldon Garcia reviewed discharge instructions with the patient and spouse. The patient and spouse verbalized understanding. Pt confirmed understanding of need for follow up with primary care provider. Important sections of discharge instructions highlighted for patient. Pt is not in any current distress and shows no evidence of clinical instability. Pt is hemodynamically/respiratorily stable. Paperwork given by provider and reviewed with patient and their spouse, opportunity for questions/clarification given. Pt was given work note if applicable/requested. Pt denies any additional complaints. Pt walked out of ED. Patient Vitals for the past 4 hrs:   Pulse Resp BP SpO2   03/26/21 1600 88 15 115/79 95 %       Pt took off all cardiac monitoring/respiratory monitoring d/t BP cuff hurting arm & pt demanding to get dressed.     Ahmet Deng RN

## 2021-03-26 NOTE — DISCHARGE INSTRUCTIONS
Your CT scan did not show a blood clot in your lungs. Your cardiac enzyme was negative today. We discussed the work-up with, neurology, who recommended you follow-up with them in clinic next week. Also follow-up with cardiology for further work-up of the chest pain. Thank you.

## 2021-03-27 ENCOUNTER — PATIENT OUTREACH (OUTPATIENT)
Dept: OTHER | Age: 61
End: 2021-03-27

## 2021-03-27 NOTE — PROGRESS NOTES
[facility] progress note    Patient eligible for Anat Llamas 994 care management    Two patient identifiers verified. Verified  and address for HIPAA security. [id number] [employer]    Pt was seen at [facility] ER 3/26/21    Diagnosis Comment   SOB (shortness of breath)    Chest pain, unspecified type      ED Triage Notes by Darnell Case RN at 21 1440     [facility] sent by [facility] for [facility]B. Pt reports SOB for months and has been using inhaler with relief. States last week SOB started to get worse and is now having chest pain. Pt  post ambulation with O2 sats 97%. Pt denies CP or wheezing today, but does report weakness, cough and SOB with and without exertion. Denies temp. Pt does not have a pulse ox. Pt reported she has 2 different inhalers that she uses daily and PRN, but reported minimal improvement over the past week. Pt saw pulmonary/allergist prior to being sent to the ER. Reported s/s has gotten worse over the past week. Pt was not tested for COVID in the ER to r/o possible cause of changes in s/s. Encouraged to schedule an appt with PCP and pulmonary for next week and to use [employer] or [employer] if needed. Pt may also want to consider testing for COVID. Discussed the care management program.  Patient agrees to care management services at this time. Patient's primary care provider relationship reviewed with patient and modified, as applicable. Patient has significant diagnosis of HTN and SOB . Care management assessment completed:    Patient stated problem: \"My SOB has gotten worse over the past week and my inhalers doesn't seem to helping. \"    Care manager identified primary concern - risk for respiratory distress      Emotional Status/Adjustment to Health State: anxious    Readiness to Change: []  Pre-contemplative    []  Contemplative  [x]  Preparation               []  Action                  []  Maintenance    Barriers/Challenges to Care: [] Decline in memory    []  Language barrier     []  Emotional                  []  Limited mobility  [x]  Lack of motivation     [] Vision, hearing or cognitive impairment [x]  Knowledge [] Financial Barriers  []  Other     Patient stated goal - schedule an earlier appt with PCP    Care Manager/Provider identified medical goal    Goals      Attends follow-up appointments as directed. Future Appointments   Date Time Provider Dana Hilliard   3/29/2021 12:30 PM [facility] MRI 1 [facility] [place]   3/30/2021 10:00 AM DOPPLER NEUR[facility] BS AMB   3/30/2021 10:15 AM EEG SCHEDULE NEUR[facility]TC BS AMB   4/13/2021 12:30 PM Martin Nelson PsyD NEUROWTC BS AMB   4/16/2021  8:00 AM Nerissa Cannon MD [facility] BS AMB   5/14/2021 10:45 AM WTC YESY 1 [id number] Coordination related to Memory Loss (pt-stated)      Knowledge and adherence of prescribed medication (ie. action, side effects, missed dose, etc.). Taking prescribed meds       Supportive resources in place to maintain patient in the community (ie. Home Health, DME equipment, refer to, medication assistant plan, etc.)      [facility] - # [phone] 24/7  Nurse Access line 24/7  Be Well  [address] [facility] 97 Urgent [facility] [phone]  [employer] Now - Tavia  [facility] Workday  IT - [phone]  [facility] Help - 1- [phone]  Associate Services for advice and direction, by calling [phone] and pressing 1         Understands red flags post discharge.       CP, SOB, cough, wheeze, temp >100    3/26/2021  6:08 PM - Huang, Lab In [facility]    Component Value Flag Ref Range Units Status   pH 7.48  High   7.35 - 7.45   Final   PCO2 32  Low   35 - 45 mmHg Final   PO2 77  Low   80 - 100 mmHg Final   O2 SAT 96   92 - 97 % Final   BICARBONATE 23   22 - 26 mmol/L Final   BASE EXCESS 0.6    mmol/L Final   O2 METHOD ROOM AIR      Final   Sample source ARTERIAL      Final   SITE RIGHT RADIAL      Final   NYDIA'S TEST YES      Final                 Support System/Resources:     Advance Care Planning: not on file     ADLS/DME: n/a    Referrals: n/a      Upcoming appointments:   Future Appointments   Date Time Provider Dana Hilliard   3/30/2021 10:00 AM [facility]ER [facility]   3/30/2021 10:15 AM EEG SCHEDULE [facility]UR[facility]TC [facility]   3/30/2021  1:15 PM Valeria Cannon MD [facility]   4/13/2021 12:30 PM Ritika BRUNNER PsyD NEUROWTC [facility]   4/16/2021  8:00 AM Valeria Cannon MD IMACWTC [facility]   5/14/2021 10:45 AM [address] for Chronic Disease:    1. Diagnosis 1- SOB/CP    2. Diagnosis 2- HTN    3. Memory loss    3. Focused Assessment- Respiratory Condition Focused Assessment  Supplemental oxygen use? no   Clean and working equipment? n/a   Oxygen settings- n/a  Cough yes    Patient reported important numbers to know:  Oxygen level n/a   Temperature afebrile   Description of any sputum clear   Pain 0   Weight 180     Any change in activity level?  no  Any of the following? Shortness of breath or difficulty breathing yes   Dizziness/lightheadedness no   Fever no   Low body temperature no   Chills or shaking no   Sweating no    Dyspnea on exertion yes     Fatigue yes     Anxiety yes    Confusionno   Unexplained change in weight loss no   Sleep disturbance n/a     Incentive Spirometer? no   Does patient have action plan? yes       4. Key pt activities to achieve better health:   [x]  Weight loss  []  Improved diabetic control  [x]  Decreased cholesterol levels  []  Decreased blood pressure  []    []    Patient verbalized understanding of all information discussed. Pt has my contact information for any further questions, concerns, or needs.     Plan for next call:  2 days

## 2021-03-29 ENCOUNTER — PATIENT OUTREACH (OUTPATIENT)
Dept: OTHER | Age: 61
End: 2021-03-29

## 2021-03-29 ENCOUNTER — HOSPITAL ENCOUNTER (OUTPATIENT)
Dept: MRI IMAGING | Age: 61
Discharge: HOME | End: 2021-03-29
Attending: NURSE PRACTITIONER
Payer: COMMERCIAL

## 2021-03-29 DIAGNOSIS — R90.89 ABNORMAL BRAIN MRI: Primary | ICD-10-CM

## 2021-03-29 DIAGNOSIS — R41.3 MEMORY LOSS: ICD-10-CM

## 2021-03-29 DIAGNOSIS — G93.9 LESION OF BRAIN: ICD-10-CM

## 2021-03-29 PROCEDURE — 70551 MRI BRAIN STEM W/O DYE: CPT

## 2021-03-29 NOTE — PROGRESS NOTES
CCM Outreach     Call placed to pt, Verified  and address for HIPAA security. Goals      Attends follow-up appointments as directed. Future Appointments   Date Time Provider Dana Hilliard   3/29/2021 12:30 PM [facility] 1 [facility] [place]   3/30/2021 10:00 AM [facility] BS AMB   3/30/2021 10:15 AM EEG SCHEDULE [facility]TC BS AMB   2021 12:30 PM Martin Nelson PsyD [facility]UR[facility] BS AMB   2021  8:00 AM Alicia Cannon MD [facility] BS AMB   2021 10:45 AM WTC YESY 1 [facility]     3/29/21   PCP - has not scheduled an earlier appt, but will call today  Pulmonary/allergist - need to call and schedule F/U appt TBD  MRI head - completed today       Care Coordination related to Memory Loss (pt-stated)      Knowledge and adherence of prescribed medication (ie. action, side effects, missed dose, etc.). Taking prescribed meds       Supportive resources in place to maintain patient in the community (ie. Home Health, DME equipment, refer to, medication assistant plan, etc.)      [facility] - # [phone]   Nurse Access line   [facility]  [address] [employer] [address] [phone]  HR Service Now - [facility] Workday  IT - [phone]  [facility] Help - [phone]  Associate Services for advice and direction, by calling [phone] and pressing [phone]         Understands red flags post discharge.       CP, SOB, cough, wheeze, temp >100    3/26/2021  6:08 PM - Huang, Lab In [facility]    Component Value Flag Ref Range Units Status   pH 7.48  High   7.35 - 7.45   Final   PCO2 32  Low   35 - 45 mmHg Final   PO2 77  Low   80 - 100 mmHg Final   O2 SAT 96   92 - 97 % Final   BICARBONATE 23   22 - 26 mmol/L Final   BASE EXCESS 0.6    mmol/L Final   O2 METHOD ROOM AIR      Final   Sample source ARTERIAL      Final   SITE RIGHT RADIAL      Final   NYDIA'S TEST YES      Final     3/29/21 reported that she continues to have SOB, minimal improvment. Pt also reported having nausea, which has been going on for weeks. Pt will address with PCP.              CM will f/u in 1 week

## 2021-03-30 ENCOUNTER — OFFICE VISIT (OUTPATIENT)
Dept: NEUROLOGY | Age: 61
End: 2021-03-30

## 2021-03-30 ENCOUNTER — OFFICE VISIT (OUTPATIENT)
Dept: INTERNAL MEDICINE CLINIC | Age: 61
End: 2021-03-30

## 2021-03-30 VITALS
RESPIRATION RATE: 22 BRPM | WEIGHT: 191.4 LBS | OXYGEN SATURATION: 97 % | BODY MASS INDEX: 35.22 KG/M2 | HEIGHT: 62 IN | HEART RATE: 102 BPM | DIASTOLIC BLOOD PRESSURE: 76 MMHG | SYSTOLIC BLOOD PRESSURE: 110 MMHG | TEMPERATURE: 97.5 F

## 2021-03-30 DIAGNOSIS — R41.3 MEMORY LOSS: Primary | ICD-10-CM

## 2021-03-30 DIAGNOSIS — I10 ESSENTIAL HYPERTENSION: ICD-10-CM

## 2021-03-30 DIAGNOSIS — F98.8 ATTENTION DEFICIT DISORDER, UNSPECIFIED HYPERACTIVITY PRESENCE: Primary | ICD-10-CM

## 2021-03-30 DIAGNOSIS — J45.20 MILD INTERMITTENT REACTIVE AIRWAY DISEASE WITHOUT COMPLICATION: ICD-10-CM

## 2021-03-30 PROCEDURE — 99214 OFFICE O/P EST MOD 30 MIN: CPT | Performed by: INTERNAL MEDICINE

## 2021-03-30 NOTE — PROGRESS NOTES
Matt Huff (: 1960) is a 61 y.o. female, established patient, here for evaluation of the following chief complaint(s):  ED Follow-up       ASSESSMENT/PLAN:  1. Attention deficit disorder, unspecified hyperactivity presence  Followed by neurology, Stacey Barboza NP. Adderall seems to be working well in increasing her concentration, but wearing off around 1PM. Discussed that her dosage likely needs to be adjusted and advised patient to discuss this with neurology. She has an appointment on 21. 2. Essential hypertension  BP is at goal. I do not recommend any change in medications. Continue on current regimen of HCTZ and losartan. 3. Mild intermittent reactive airway disease without complication  Not stable, with continued nasal drainage, cough, and SOB despite being on albuterol inhaler and Singulair. Recommend patient f/u with pulmonology. No follow-ups on file. SUBJECTIVE/OBJECTIVE:  HPI  ER Summary: [facility] ER on 21 due to worsening SOB and CP. At the ER, patient's HR was 137 post ambulation with O2 sats 97%. CTA of the chest revealed no pulmonary embolism to the first subsegmental arterial level. Her cardiac enzyme was negative. Her work up was discussed with neurology who recommended she f/u with them in clinic in the next week. She was also recommended to f/u with cardiology. Patient was discharged on 21. Today, patient notes that she continues to feel fatigued. ADHD: Followed by neurology. She was started on Adderall by Stacey Barboza NP. Her  states that he has noticed an improvement in her concentration since she started on Adderall. Patient notes that by 1PM her Adderall wears off and she feels fatigued. She has an appt with neurology on 21. Hypertension ROS: taking medications as instructed, no medication side effects noted, no TIA's, no swelling of ankles.    New concerns: BP in office today is 110/76. She continues on HCTZ and losartan. Asthma/Allergies: Allergy testing positive for plant, animal, and environmental allergies. She notes a lot of nasal drainage and cough. She continues on Flonase, albuterol inhaler, and Singulair. Review of Systems   Constitutional: Positive for fatigue. Allergic/Immunologic: Positive for environmental allergies. All other systems reviewed and are negative. Physical Exam  Constitutional:       Appearance: Normal appearance. HENT:      Right Ear: Tympanic membrane and external ear normal.      Left Ear: Tympanic membrane and external ear normal.      Mouth/Throat:      Mouth: Mucous membranes are moist.      Pharynx: Oropharynx is clear. Cardiovascular:      Rate and Rhythm: Normal rate and regular rhythm. Pulses: Normal pulses. Heart sounds: Normal heart sounds. Pulmonary:      Effort: Pulmonary effort is normal.      Breath sounds: Normal breath sounds. Musculoskeletal: Normal range of motion. Skin:     General: Skin is warm and dry. Neurological:      General: No focal deficit present. Mental Status: She is oriented to person, place, and time. Psychiatric:         Mood and Affect: Mood normal.         Behavior: Behavior normal.     On this date 03/30/2021 I have spent 30 minutes reviewing previous notes, test results and face to face with the patient discussing the diagnosis and importance of compliance with the treatment plan as well as documenting on the day of the visit. Lab results and schedule of future lab studies reviewed with patient. Reviewed diet, exercise and weight control. Written by Esperanza Kingsley, as dictated by Michael Villasenor MD.     Current diagnosis and concerns discussed with pt at length. Understands risks and benefits or current treatment plan and medications and accepts the treatment and medication with any possible risks. Pt asks appropriate questions which were answered.  Pt instructed to call with any concerns or problems. An electronic signature was used to authenticate this note.   -- Val Myers

## 2021-03-31 DIAGNOSIS — G93.9 BRAIN LESION: ICD-10-CM

## 2021-03-31 DIAGNOSIS — R90.89 ABNORMAL BRAIN MRI: Primary | ICD-10-CM

## 2021-03-31 NOTE — ED PROVIDER NOTES
Patient is a 58-year-old female with history of anxiety, desmoid fibromatosis, dysfunctional uterine bleeding, GERD, hyperlipidemia, hypertension who presents with shortness of breath from pulmonologist office. Patient reports that she has ongoing symptoms for several months which has been progressively worsening. [facility] and was diagnosed with bronchitis and discharged home on antibiotics. Patient reports that she completed a course of antibiotics with no improvement in symptoms. Saw Dr. Ernesto Macedo last week in clinic and was started on 2 inhalers that she has been compliant with. Patient reports initially she felt improved but now that the medications are not working. Notes that she saw Dr. Wellington Jeans today in clinic who recommended ED visit to rule out PE. Patient denies any prior history of ACS, CAD, no previous cardiac work-up.            Past Medical History:   Diagnosis Date    Anxiety     Desmoid fibromatosis     chest    Dysfunctional uterine bleeding 8/17/2011    GERD (gastroesophageal reflux disease)     Hyperlipemia     Hypertension     Insomnia     severe, sleep study normal 2011    Migraine 2/24/2012       Past Surgical History:   Procedure Laterality Date    COLONOSCOPY N/A 7/31/2020    COLONOS[facility] performed by D[facility]ell Kahn MD at [facility] ENDOSCOPY    ENDOSCOPY, COLON, DIAGNOSTIC  5/2010    HX BREAST BIOPSY Right     benign    HX GYN      d and c    HX OTHER SURGICAL      tumor removal in chest removed    [place] CHEST SURGERY PROCEDURE UNLISTED      desmoid tumor         Family History:   Problem Relation Age of Onset    Thyroid Disease Mother         goiter    Hypertension Father     Stroke Father     Thyroid Disease Sister         nodules    Hypertension Sister     Cancer Maternal Grandmother         polycythemia    Cancer Paternal Grandmother         breast    Breast Cancer Paternal Grandmother     Cancer Paternal Grandfather         lung cancer    Other Sister lyme disease    No Known Problems Sister        Social History     Socioeconomic History    Marital status:      Spouse name: Not on file    Number of children: Not on file    Years of education: Not on file    Highest education level: Not on file   Occupational History    Not on file   Social Needs    Financial resource strain: Not on file    Food insecurity     Worry: Not on file     Inability: Not on file    Transportation needs     Medical: Not on file     Non-medical: Not on file   Tobacco Use    Smoking status: Never Smoker    Smokeless tobacco: Never Used   Substance and Sexual Activity    Alcohol use: Yes     Comment: rarely    Drug use: No    Sexual activity: Yes     Partners: Male   Lifestyle    Physical activity     Days per week: Not on file     Minutes per session: Not on file    Stress: Not on file   Relationships    Social connections     Talks on phone: Not on file     Gets together: Not on file     Attends Judaism service: Not on file     Active member of club or organization: Not on file     Attends meetings of clubs or organizations: Not on file     Relationship status: Not on file    Intimate partner violence     Fear of current or ex partner: Not on file     Emotionally abused: Not on file     Physically abused: Not on file     Forced sexual activity: Not on file   Other Topics Concern    Not on file   Social History Narrative    Not on file         ALLERGIES: Erythromycin; Lisinopril; and Other plant, animal, environmental    Review of Systems   Constitutional: Negative for chills and fever. HENT: Negative for drooling and nosebleeds. Eyes: Negative for pain and itching. Respiratory: Positive for shortness of breath. Negative for choking and stridor. Cardiovascular: Positive for chest pain. Negative for leg swelling. Gastrointestinal: Negative for abdominal distention and rectal pain. Endocrine: Negative for heat intolerance and polyphagia. Genitourinary: Negative for enuresis and genital sores. Musculoskeletal: Negative for arthralgias and joint swelling. Skin: Negative for color change. Allergic/Immunologic: Negative for immunocompromised state. Neurological: Negative for tremors and speech difficulty. Hematological: Negative for adenopathy. Psychiatric/Behavioral: Negative for dysphoric mood and sleep disturbance. Vitals:    03/26/21 1442 03/26/21 1443 03/26/21 1600   BP: 118/78  115/79   Pulse: (!) 119 (!) 137 88   Resp: 30  15   Temp: 97.1 °F (36.2 °C)     SpO2: 97%  95%   Weight: 81.6 kg (180 lb)     Height: 5' 2\" (1.575 m)              Physical Exam  Vitals signs and nursing note reviewed. Constitutional:       Appearance: She is well-developed. She is not ill-appearing, toxic-appearing or diaphoretic. HENT:      Head: Normocephalic. Nose: Nose normal.   Eyes:      Conjunctiva/sclera: Conjunctivae normal.   Neck:      Musculoskeletal: Normal range of motion and neck supple. Cardiovascular:      Rate and Rhythm: Regular rhythm. Heart sounds: Normal heart sounds. Pulmonary:      Effort: Pulmonary effort is normal. No respiratory distress. Breath sounds: Normal breath sounds. No decreased breath sounds, wheezing, rhonchi or rales. Comments: Patient speaking in full sentences. Noted to have no initial increased work of breathing. During conversation, patient's breathing appearing to be more labored. Abdominal:      General: There is no distension. Palpations: Abdomen is soft. Tenderness: There is no abdominal tenderness. Musculoskeletal: Normal range of motion. General: No deformity. Skin:     General: Skin is warm and dry. Neurological:      Mental Status: She is alert and oriented to person, place, and time.       Coordination: Coordination normal.   Psychiatric:         Behavior: Behavior normal.          MDM  Number of Diagnoses or Management Options  Chest pain, unspecified type  SOB (shortness of breath)  Diagnosis management comments: Patient noted to be ambulating in ED with steady gait, no increased work of breathing noted. No concern for unstable airway. Discussed that her CT PE was negative for PE today. Discussed with pulmonology on-call who recommended follow-up next week in clinic. Encourage patient to make follow-up appointment with cardiology to rule out CAD. Patient reports that she knows Dr. Armida Pedersen, cardiology, and will schedule follow-up visit with him. Stable for discharge at this time. ED Course as of Mar 31 1811   Fri Mar 26, 2021   2427 Dr Madalynn Boxer on call for pulmonary. Ok to discharge home.     [AL]   1904 spoke with patient and discussed findings. She is agreeable plan for discharge and follow-up in pulmonary clinic next week. We will also make appointment with cardiology for further work-up. Stable for discharge. [AL]      ED Course User Index  [AL] Parul Arechiga MD       Procedures    Patient's results have been reviewed with them. Patient and/or family have verbally conveyed their understanding and agreement of the patient's signs, symptoms, diagnosis, treatment and prognosis and additionally agree to follow up as recommended or return to the Emergency Room should their condition change prior to follow-up. Discharge instructions have also been provided to the patient with some educational information regarding their diagnosis as well a list of reasons why they would want to return to the ER prior to their follow-up appointment should their condition change.

## 2021-04-03 ENCOUNTER — HOSPITAL ENCOUNTER (OUTPATIENT)
Dept: MRI IMAGING | Age: 61
Discharge: HOME | End: 2021-04-03
Attending: NURSE PRACTITIONER
Payer: COMMERCIAL

## 2021-04-03 VITALS — BODY MASS INDEX: 32.92 KG/M2 | WEIGHT: 180 LBS

## 2021-04-03 DIAGNOSIS — G93.9 BRAIN LESION: ICD-10-CM

## 2021-04-03 DIAGNOSIS — R90.89 ABNORMAL BRAIN MRI: ICD-10-CM

## 2021-04-03 PROCEDURE — 70552 MRI BRAIN STEM W/DYE: CPT

## 2021-04-03 PROCEDURE — A9575 INJ GADOTERATE MEGLUMI 0.1ML: HCPCS | Performed by: NURSE PRACTITIONER

## 2021-04-03 PROCEDURE — 74011250636 HC RX REV CODE- 250/636: Performed by: NURSE PRACTITIONER

## 2021-04-03 RX ORDER — GADOTERATE MEGLUMINE 376.9 MG/ML
16 INJECTION INTRAVENOUS
Status: COMPLETED | OUTPATIENT
Start: 2021-04-03 | End: 2021-04-03

## 2021-04-03 RX ADMIN — GADOTERATE MEGLUMINE 16 ML: 376.9 INJECTION INTRAVENOUS at 08:45

## 2021-04-05 NOTE — PROCEDURES
EEG:      Date:  03/30/21    Requesting Physician:  Missy Salazar MD    An EEG is requested in this 61-year-old lady with confusion and memory loss to evaluate for epileptiform abnormality. Medications:  Medications are said to include Elavil, Desyrel, Adderall, Trazodone, Aricept. This tracing is obtained during the awake state. During wakefulness the background consists of diffuse low-voltage faster-frequency beta wave activity. Hyperventilation is not performed secondary to COVID-19 precautions. Intermittent photic stimulation induces symmetric posterior driving responses. Sleep is not attained. Interpretation:   This EEG recorded during the awake state is normal.

## 2021-04-07 ENCOUNTER — VIRTUAL VISIT (OUTPATIENT)
Dept: NEUROLOGY | Age: 61
End: 2021-04-07
Payer: COMMERCIAL

## 2021-04-07 DIAGNOSIS — G93.9 RIGHT FRONTAL LOBE LESION: Primary | ICD-10-CM

## 2021-04-07 DIAGNOSIS — D36.7 DERMOID CYST OF HEAD: ICD-10-CM

## 2021-04-07 PROCEDURE — 99214 OFFICE O/P EST MOD 30 MIN: CPT | Performed by: NURSE PRACTITIONER

## 2021-04-07 RX ORDER — BUSPIRONE HYDROCHLORIDE 10 MG/1
TABLET ORAL
Qty: 30 TAB | Refills: 5 | Status: SHIPPED | OUTPATIENT
Start: 2021-04-07 | End: 2021-04-16

## 2021-04-07 NOTE — PROGRESS NOTES
Norma More is a 61 y.o. female who was seen by synchronous (real-time) audio-video technology on 4/7/2021 for Follow-up and Results    FU for MRI, EEG virtually with . Still feeling like symptoms continue to persist and at times are worse. Memory continues to be her big concern. As a recap, \"Her bigger concern is her memory. She has noticed decline and so has her . Work has given her a fit to work evaluation that needs to be filled out. 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 She can remember how to do her job without any issues. Anything new is tough to grasp. Also has trouble with multi-tasking, remembering certain questions that were asked of her. Seems her boss has noticed and sparked the memory concerns. She is currently not working. She noticed cognitive issues with day to day remembering, focusing. Asking same questions a lot. Telling same stories, etc. Per herself and her . She is not sleeping well   She is anxious during the day also. Sleep makes this worse  Her brain runs at night. She is driving without any issues. No problems functioning at home either. Just repeating herself\"    She is still out of work. Does feel the Adderall has done something with concentration.  agrees with this. She is not sleeping well though   Can not shut her brain down. Thinking all night. Sleeping a few hours if that. Was able to get cognitive evaluation moved up to tomorrow. Did just discuss with Dr. Shari Valdez and will re-evaluate post testing. Will fax update today. Assessment & Plan:   Diagnoses and all orders for this visit:    1. Right frontal lobe lesion  -     REFERRAL TO NEUROSURGERY    2. Dermoid cyst of head  -     REFERRAL TO NEUROSURGERY    Other orders  -     busPIRone (BUSPAR) 10 mg tablet; Take 1 tablet by mouth nightly.         Personally reviewed and evaluated MRI brain and EEG  Discussed in full with patient and . Will refer to Neurosurgery to help evaluate lesion further. Keep Adderall for now  Add Buspar 10 mg for sleep, anxiety at night to help shut down brain. Avoid benzos   Will re-evaluate cognitively post neuropsych testing to look at job functioning. Subjective:       Prior to Admission medications    Medication Sig Start Date End Date Taking? Authorizing Provider   busPIRone (BUSPAR) 10 mg tablet Take 1 tablet by mouth nightly. 4/7/21  Yes Onesimo Mclaughlin NP   guaifenesin (MUCINEX PO) Take  by mouth as needed. Yes Provider, Historical   clobetasoL (Temovate) 0.05 % ointment Apply  to affected area nightly. To arms and hands   Yes Provider, Historical   mometasone (Elocon) 0.1 % ointment Apply  to affected area nightly. Yes Provider, Historical   ascorbic acid, vitamin C, (VITAMIN C) 500 mg tablet Take 500 mg by mouth daily. Yes Provider, Historical   vitamin A-R-G-lutein-minerals (OCUVITE) tablet Take 1 Tab by mouth nightly. Yes Provider, Historical   fluticasone propionate (Flonase Allergy Relief) 50 mcg/actuation nasal spray 2 Sprays by Both Nostrils route daily as needed for Allergies. Yes Provider, Historical   fluticasone propion-salmeteroL (Wixela Inhub) 250-50 mcg/dose diskus inhaler Take 1 Puff by inhalation every twelve (12) hours. Yes Provider, Historical   omeprazole (PRILOSEC) 20 mg capsule Take 20 mg by mouth nightly. Yes Provider, Historical   hydroCHLOROthiazide (HYDRODIURIL) 25 mg tablet Take 1 Tab by mouth daily. 3/22/21  Yes Sae Cannon MD   albuterol (PROVENTIL HFA, VENTOLIN HFA, PROAIR HFA) 90 mcg/actuation inhaler Take 2 Puffs by inhalation every four (4) hours as needed for Shortness of Breath. 3/19/21  Yes Provider, Historical   montelukast (SINGULAIR) 10 mg tablet Take 10 mg by mouth nightly. Yes Provider, Historical   traZODone (DESYREL) 50 mg tablet Take 2 Tabs by mouth nightly.  3/22/21  Yes Onesimo Mclaughlin NP   losartan (COZAAR) 100 mg tablet Take 1 Tab by mouth daily. 1/20/21  Yes Robert Cannon MD   donepeziL (ARICEPT) 10 mg tablet TAKE 1 TABLET BY MOUTH EVERY DAY AT NIGHT 9/8/20  Yes Elan Mclaughlin NP   rosuvastatin (CRESTOR) 10 mg tablet Take 1 Tab by mouth nightly. 5/15/20  Yes Enid Wagner MD     Patient Active Problem List   Diagnosis Code    Dysfunctional uterine bleeding N93.8    Migraine G43.909    Rhinitis, allergic J30.9    Insomnia G47.00    Essential hypertension I10    Severe obesity (BMI 35.0-39. 9) with comorbidity ([address] E66.01    Dyslipidemia E78.5    Nuclear cataract Chevis Boor    Pseudophakia of both eyes Z96.1    Pseudophakia of right eye Z96.1    Tear film insufficiency H04.129     Patient Active Problem List    Diagnosis Date Noted    Dyslipidemia 05/04/2018    Pseudophakia of both eyes 05/02/2018    Severe obesity (BMI 35.0-39. 9) with comorbidity ([address] 04/30/2018    Pseudophakia of right eye 08/14/2017    Nuclear cataract 04/25/2016    Tear film insufficiency 04/25/2016    Essential hypertension 02/08/2016    Insomnia     Rhinitis, allergic 02/02/2015    Migraine 02/24/2012    Dysfunctional uterine bleeding 08/17/2011     Current Outpatient Medications   Medication Sig Dispense Refill    busPIRone (BUSPAR) 10 mg tablet Take 1 tablet by mouth nightly. 30 Tab 5    guaifenesin (MUCINEX PO) Take  by mouth as needed.  clobetasoL (Temovate) 0.05 % ointment Apply  to affected area nightly. To arms and hands      mometasone (Elocon) 0.1 % ointment Apply  to affected area nightly.  ascorbic acid, vitamin C, (VITAMIN C) 500 mg tablet Take 500 mg by mouth daily.  vitamin R-S-I-lutein-minerals (OCUVITE) tablet Take 1 Tab by mouth nightly.  fluticasone propionate (Flonase Allergy Relief) 50 mcg/actuation nasal spray 2 Sprays by Both Nostrils route daily as needed for Allergies.       fluticasone propion-salmeteroL (Wixela Inhub) 250-50 mcg/dose diskus inhaler Take 1 Puff by inhalation every twelve (12) hours.  omeprazole (PRILOSEC) 20 mg capsule Take 20 mg by mouth nightly.  hydroCHLOROthiazide (HYDRODIURIL) 25 mg tablet Take 1 Tab by mouth daily. 90 Tab 1    albuterol (PROVENTIL HFA, VENTOLIN HFA, PROAIR HFA) 90 mcg/actuation inhaler Take 2 Puffs by inhalation every four (4) hours as needed for Shortness of Breath.  montelukast (SINGULAIR) 10 mg tablet Take 10 mg by mouth nightly.  traZODone (DESYREL) 50 mg tablet Take 2 Tabs by mouth nightly. 60 Tab 5    losartan (COZAAR) 100 mg tablet Take 1 Tab by mouth daily. 90 Tab 1    donepeziL (ARICEPT) 10 mg tablet TAKE 1 TABLET BY MOUTH EVERY DAY AT NIGHT 90 Tab 1    rosuvastatin (CRESTOR) 10 mg tablet Take 1 Tab by mouth nightly. 90 Tab 1     Allergies   Allergen Reactions    Erythromycin Nausea and Vomiting     Cannot keep erythromycin down.     Lisinopril Other (comments)     \"margoth achy\"    Other Plant, Animal, Environmental Other (comments)     hayfever     Past Medical History:   Diagnosis Date    Anxiety     Desmoid fibromatosis     chest    Dysfunctional uterine bleeding 8/17/2011    GERD (gastroesophageal reflux disease)     Hyperlipemia     Hypertension     Insomnia     severe, sleep study normal 2011    Migraine 2/24/2012     Past Surgical History:   Procedure Laterality Date    COLONOSCOPY N/A 7/31/2020    COLONOS[facility] performed by [facility]uri [facility]ron [facility] at [facility] ENDOSCOPY    ENDOSCOPY, COLON, DIAGNOSTIC  5/2010    HX BREAST BIOPSY Right     benign    HX GYN      d and c    HX OTHER SURGICAL      tumor removal in chest removed    HI CHEST SURGERY PROCEDURE UNLISTED      desmoid tumor     Family History   Problem Relation Age of Onset    Thyroid Disease Mother         goiter    Hypertension Father     Stroke Father     Thyroid Disease Sister         nodules    Hypertension Sister     Cancer Maternal Grandmother         polycythemia    Cancer Paternal Grandmother         breast    Breast Cancer Paternal Grandmother     Cancer Paternal Grandfather         lung cancer    Other Sister         lyme disease    No Known Problems Sister      Social History     Tobacco Use    Smoking status: Never Smoker    Smokeless tobacco: Never Used   Substance Use Topics    Alcohol use: Yes     Comment: rarely       Review of Systems   Constitutional: Positive for malaise/fatigue. Eyes: Positive for blurred vision and photophobia. Negative for double vision. Gastrointestinal: Negative for nausea and vomiting. Musculoskeletal: Negative for falls. Neurological: Positive for dizziness, tremors, sensory change, weakness and headaches. Psychiatric/Behavioral: Positive for memory loss. Negative for depression, hallucinations, substance abuse and suicidal ideas. The patient has insomnia. The patient is not nervous/anxious. Objective:   No flowsheet data found.      [INSTRUCTIONS:  \"[x]\" Indicates a positive item  \"[]\" Indicates a negative item  -- DELETE ALL ITEMS NOT EXAMINED]    Constitutional: [x] Appears well-developed and well-nourished [x] No apparent distress      [] Abnormal -     Mental status: [x] Alert and awake  [x] Oriented to person/place/time [x] Able to follow commands    [] Abnormal -     Eyes:   EOM    [x]  Normal    [] Abnormal -   Sclera  [x]  Normal    [] Abnormal -          Discharge [x]  None visible   [] Abnormal -     HENT: [x] Normocephalic, atraumatic  [] Abnormal -   [x] Mouth/Throat: Mucous membranes are moist    External Ears [x] Normal  [] Abnormal -    Neck: [x] No visualized mass [] Abnormal -     Pulmonary/Chest: [x] Respiratory effort normal   [x] No visualized signs of difficulty breathing or respiratory distress        [] Abnormal -      Musculoskeletal:   [x] Normal gait with no signs of ataxia         [x] Normal range of motion of neck        [] Abnormal -     Neurological:        [x] No Facial Asymmetry (Cranial nerve 7 motor function) (limited exam due to video visit) [x] No gaze palsy        [] Abnormal -          Skin:        [x] No significant exanthematous lesions or discoloration noted on facial skin         [] Abnormal -            Psychiatric:       [x] Normal Affect [] Abnormal -        [x] No Hallucinations    Other pertinent observable physical exam findings:-    INDICATION: Other amnesia     EXAMINATION:  MRI BRAIN WO CONTRAST     COMPARISON:  March 31, 2017     TECHNIQUE:  Multiplanar multisequence acquisition without contrast of the brain.        FINDINGS:       Ventricles:  Midline, no hydrocephalus. Brain Parenchyma/Brainstem:  Normal for age. No acute infarction. Intracranial Hemorrhage:  No acute hemorrhage. There is a round area of FLAIR/T2  hyperintense and T1 isointense signal at the right frontal convexity along the  right aspect of the falx measuring 13 x 15 mm, not evident on prior MRI. There  is increased signal on diffusion-weighted imaging though no reduction on ADC  map. No associated susceptibility artifact. Basal Cisterns:  Normal.   Flow Voids:  Normal.  Additional Comments:  N/A.     IMPRESSION     1. Round, 13 x 15 mm extra-axial parafalcine lesion along the right frontal  convexity new since prior study. Signal characteristics suggest this could  represent a dermoid/epidermoid cyst. Other masses such as meningioma or  arachnoid cyst unlikely. Unlikely to represent hematoma. Consider CT/MRI  follow-up with contrast.  2. No acute process. Comparisons made with 3/29/2021     INDICATION: Previous brain lesion     TECHNIQUE: Pre and postcontrast MRI of the brain was performed after the  administration of 16 mL of Dotarem.     FINDINGS: High right frontal lobe parafalcine lesion is again noted which  demonstrates T2 and FLAIR hyperintensity with some mild increased T1 signal in  the inferior aspect of the lesion.  High signal intensity within the lesion on  diffusion-weighted imaging is again noted although ADC value is relatively  normal. This measures 1.5 x 1.3 cm and is unchanged in size. No definite  enhancement is noted. No new lesions are noted. Remainder the brain is  unremarkable.     IMPRESSION  Parafalcine high frontal lobe lesion is unchanged. Differential  remains the same. This may represent a dermoid/epidermoid cyst given mixed  signal intensity on T1-weighted imaging. We discussed the expected course, resolution and complications of the diagnosis(es) in detail. Medication risks, benefits, costs, interactions, and alternatives were discussed as indicated. I advised her to contact the office if her condition worsens, changes or fails to improve as anticipated. She expressed understanding with the diagnosis(es) and plan. Srikanth Huerta, was evaluated through a synchronous (real-time) audio-video encounter. The patient (or guardian if applicable) is aware that this is a billable service. Verbal consent to proceed has been obtained within the past 12 months. The visit was conducted pursuant to the emergency declaration under the [address] [place] [address] authority and the [employer] and [employer] General Act. Patient identification was verified, and a caregiver was present when appropriate. The patient was located in a state where the provider was credentialed to provide care.       Ector Jean NP

## 2021-04-08 ENCOUNTER — OFFICE VISIT (OUTPATIENT)
Dept: NEUROLOGY | Age: 61
End: 2021-04-08
Payer: COMMERCIAL

## 2021-04-08 DIAGNOSIS — F41.9 ANXIETY AND DEPRESSION: ICD-10-CM

## 2021-04-08 DIAGNOSIS — R41.89 COGNITIVE DECLINE: ICD-10-CM

## 2021-04-08 DIAGNOSIS — R41.840 ATTENTION DEFICIT: ICD-10-CM

## 2021-04-08 DIAGNOSIS — F32.A ANXIETY AND DEPRESSION: ICD-10-CM

## 2021-04-08 DIAGNOSIS — G93.9 RIGHT FRONTAL LOBE LESION: ICD-10-CM

## 2021-04-08 DIAGNOSIS — G31.84 MILD COGNITIVE IMPAIRMENT: Primary | ICD-10-CM

## 2021-04-08 DIAGNOSIS — R41.3 SHORT-TERM MEMORY LOSS: ICD-10-CM

## 2021-04-08 PROCEDURE — 90791 PSYCH DIAGNOSTIC EVALUATION: CPT | Performed by: CLINICAL NEUROPSYCHOLOGIST

## 2021-04-08 NOTE — PROGRESS NOTES
[address] [facility],5[address]  [address]   [address] [place] [address] [place] [address] [address]   [phone] Office   [phone] Fax      Neuropsychology    Initial Diagnostic Interview Note      Referral:  Michele Escobar MD    Srikanth Huerta is a 61 y.o. right handed  female who was accompanied by her spouse to the initial clinical interview on 4/8/21. Please refer to her medical records for details pertaining to her history. At the start of the appointment, I reviewed the patient's [facility] Epic Chart (including Media scanned in from previous providers) for the active Problem List, all pertinent Past Medical Hx, medications, recent radiologic and laboratory findings. In addition, I reviewed pt's documented Immunization Record and Encounter History. When I saw her last in 2017:  Srikanth Huerta is a 64 y.o. right handed  and remarried  female who was unaccompanied to the initial clinical interview on 7/28/17. Please refer to her medical records for details pertaining to her history. Briefly, the patient reported that she completed college. She denied any history of previously diagnosed LD and/or receipt of special education services. She works at the [employer] for [employer], and has been here since 1987. She is . She just had her right cataract out. No history of previously diagnosed stroke, TBI, meningitis/encephalitis, LIANA Fever, Lupus, Lyme, brain tumor, toxic chemical exposure, seizures. She has a history of chronic migraines since about age 13 and is on medication for same. She is on amitryptiline by Dr. Thomas Spencer, which makes her tired and sluggish. She has not slept well since she had her first child 35 years ago. She, her spouse, her boss have all noticed that she struggles with short term memory. This has been going on for a couple of years.   She had a couple of migraines so bad she thought she had a stroke, but recent MRI was normal.  She forgets the content of conversations. Misplaces things. Starts tasks and does not complete. Losing words. Loses train of thought in conversations. She has also noticed struggles with attention and focus. These were not issues a few years ago. She has to write everything down. Rosy Hess and spouse both told her she needs to see someone. Her father has dementia, but he had a stroke. Mother has dementia as well. Mother had remarried someone and he starved her and has neurocognitive residua from that, too (doesn't sound like AD). When mother got to the hospital and staff asked spouse how he had let her get like that and he had an aneurysm and , right there. No changes in sense of taste or smell. She has a history of depression in the past, when she went on antidepressant when her first spouse . She took that medication for a year. He  at age 25 from Hodgkin's and  right after their second child was born. Twin sister's spouse . Another death in family as well. Those were bad years 20-30 years ago. She feels quite anxious about her memory loss. She is naturally a very happy person but this is bringing her down. She does smile 99% of the time, and this issue is the only thing that is really bothering her right now. No changes in sense of taste or smell. Appetite is too good. Marriage is going well. No changes in sex drive but has vaginal dryness, and I noted she should talk to Dr. Mariah Moulton about it. Astroglide not helpful. She has pain in the neck and hurt her neck snow skiing, water skiing, whiplash injury. Always asks her spouse to pull on her neck. Having some sharp pain on the right side of her head, going on about six months, which happens once a week. Has seen a chiropractor in the past but doesn't like them adjusting their neck.       My diagnostic impression:     This patient generated a mixed normal/abnormal range Neuropsychological Evaluation with respect to neurocognitive functioning. In this regard, impairments are noted for auditory learning, auditory memory, visual organization, visual memory, bilateral fine motor dexterity, and she has a very mild problem with attention. Otherwise, her mental status, verbal fluency, confrontation naming, verbal comprehension, perceptual reasoning, working memory, processing speed, executive functioning, and bilateral motor speed remain from normal.  From an emotional standpoint, she reports mild depression and anxiety. She has dealt with quite a bit of loss and trauma in her life.                   There is a definite organic quality to this test performance, and the memory problems are not sufficiently explained by attention and/or mood related concerns. She is young for dementia but has a strong family history of same. I would call this Mild Cognitive Impairment with Memory Loss and suggest consideration for medication for memory as well as psychiatric treatment and counseling for mood related concerns. The memory loss issue is mild, but it is present and concerning. I would like to re-evaluate her in six months to do a test by test comparison and track for any progression. If it worsens over time, then this is a very early dementia type process. This would be AD, not vascular, in my opinion. The profile is not consistent with pseudodementia. I see that she put forth good effort on this tests. She is competent, but needs supervision for medications and finances. This memory issue will definitely impact her ability to safely and effectively work without extra time to complete tasks, repetition, repeated instructions, written reminders, and reduced distraction. Follow up in six months for diagnostic clarification. Baseline now established. I would refer her to Neurology as well.   Clinical correlation is strongly advised.                   I will discuss these findings with the patient when she follows up with me in the near future. A follow up Neuropsychological Evaluation is indicated on a prn basis, especially if there are any cognitive and/or emotional changes.       DIAGNOSES:             Mild Cognitive Impairment w/ Memory Loss - Mild                                      Depression - Mild                                      Anxiety - Mild      She has been through allergy testing. A cyst has been found on the brain. No blood clots in long. 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 Memory is worse. She is forgetting conversations. Misplaces things. Loses words. Loses train of thought. She has worsening memory. Hard to comprehend, learn, process new information. Also has trouble with multi-tasking, remembering certain questions that were asked of her. Just filed for short term disability. Her boss noticed that there were cognitive problems and they were the impetus for this evaluation. She has a list of things she normally does. When given a new list, she can't get it done. It's like she can't focus. Confidence is low right now. Asks the same questions over there. Saying the same things over and over. Anxiety and depression worse. Brain not working, and mind racing at night. No problems with driving, functional independence. Comparisons made with 3/29/2021     INDICATION: Previous brain lesion     TECHNIQUE: Pre and postcontrast MRI of the brain was performed after the  administration of 16 mL of Dotarem.     FINDINGS: High right frontal lobe parafalcine lesion is again noted which  demonstrates T2 and FLAIR hyperintensity with some mild increased T1 signal in  the inferior aspect of the lesion. High signal intensity within the lesion on  diffusion-weighted imaging is again noted although ADC value is relatively  normal. This measures 1.5 x 1.3 cm and is unchanged in size. No definite  enhancement is noted. No new lesions are noted.  Remainder the brain is  unremarkable.     IMPRESSION  Parafalcine high frontal lobe lesion is unchanged. Differential  remains the same. This may represent a dermoid/epidermoid cyst given mixed  signal intensity on T1-weighted imaging. Neuropsychological Mental Status Exam (NMSE):      Neuropsychological Mental Status Exam (NMSE):  Historian: Good  Praxis: No UE apraxia  R/L Orientation: Intact to self and to other  Dress: within normal limits   Weight: Overweight  Appearance/Hygiene: within normal limits   Gait: within normal limits   Assistive Devices: Glasses  Mood: within normal limits   Affect: within normal limits   Comprehension: within normal limits   Thought Process: within normal limits   Expressive Language: within normal limits   Receptive Language: within normal limits   Motor:  No cognitive or motor perseveration  ETOH: One or two drinks a month  Tobacco: Never   Illicit: Marijuana in high school  SI/HI: Denied  Psychosis: Denied/    Insight: Within normal limits  Judgment: Within normal limits  Other Psych:      Past Medical History:   Diagnosis Date    Anxiety     Desmoid fibromatosis     chest    Dysfunctional uterine bleeding 8/17/2011    GERD (gastroesophageal reflux disease)     Hyperlipemia     Hypertension     Insomnia     severe, sleep study normal 2011    Migraine 2/24/2012       Past Surgical History:   Procedure Laterality Date    COLONOSCOPY N/A 7/31/2020    [facility]    ENDOSCOPY, COLON, DIAGNOSTIC  5/2010    HX BREAST BIOPSY Right     benign    HX GYN      d and c    HX OTHER SURGICAL      tumor removal in chest removed    RI CHEST SURGERY PROCEDURE UNLISTED      desmoid tumor       Allergies   Allergen Reactions    Erythromycin Nausea and Vomiting     Cannot keep erythromycin down.     Lisinopril Other (comments)     \"margoth achy\"    Other Plant, Animal, Environmental Other (comments)     hayfever       Family History   Problem Relation Age of Onset    Thyroid Disease Mother         goiter    Hypertension Father     Stroke Father     Thyroid Disease Sister         nodules    Hypertension Sister     Cancer Maternal Grandmother         polycythemia    Cancer Paternal Grandmother         breast    Breast Cancer Paternal Grandmother     Cancer Paternal Grandfather         lung cancer    Other Sister         lyme disease    No Known Problems Sister        Social History     Tobacco Use    Smoking status: Never Smoker    Smokeless tobacco: Never Used   Substance Use Topics    Alcohol use: Yes     Comment: rarely    Drug use: No       Current Outpatient Medications   Medication Sig Dispense Refill    busPIRone (BUSPAR) 10 mg tablet Take 1 tablet by mouth nightly. 30 Tab 5    guaifenesin (MUCINEX PO) Take  by mouth as needed.  clobetasoL (Temovate) 0.05 % ointment Apply  to affected area nightly. To arms and hands      mometasone (Elocon) 0.1 % ointment Apply  to affected area nightly.  ascorbic acid, vitamin C, (VITAMIN C) 500 mg tablet Take 500 mg by mouth daily.  vitamin E-F-S-lutein-minerals (OCUVITE) tablet Take 1 Tab by mouth nightly.  fluticasone propionate (Flonase Allergy Relief) 50 mcg/actuation nasal spray 2 Sprays by Both Nostrils route daily as needed for Allergies.  fluticasone propion-salmeteroL (Wixela Inhub) 250-50 mcg/dose diskus inhaler Take 1 Puff by inhalation every twelve (12) hours.  omeprazole (PRILOSEC) 20 mg capsule Take 20 mg by mouth nightly.  hydroCHLOROthiazide (HYDRODIURIL) 25 mg tablet Take 1 Tab by mouth daily. 90 Tab 1    albuterol (PROVENTIL HFA, VENTOLIN HFA, PROAIR HFA) 90 mcg/actuation inhaler Take 2 Puffs by inhalation every four (4) hours as needed for Shortness of Breath.  montelukast (SINGULAIR) 10 mg tablet Take 10 mg by mouth nightly.  traZODone (DESYREL) 50 mg tablet Take 2 Tabs by mouth nightly.  60 Tab 5    losartan (COZAAR) 100 mg tablet Take 1 Tab by mouth daily. 90 Tab 1    donepeziL (ARICEPT) 10 mg tablet TAKE 1 TABLET BY MOUTH EVERY DAY AT NIGHT 90 Tab 1    rosuvastatin (CRESTOR) 10 mg tablet Take 1 Tab by mouth nightly. 90 Tab 1         Plan:  Obtain authorization for testing from [facility]. Report to follow once testing, scoring, and interpretation completed. ? Organic based neurocognitive issues versus mood disorder or combination of same. ? Problems organic, functional, or both? This note will not be viewable in [address]

## 2021-04-10 ENCOUNTER — HOSPITAL ENCOUNTER (EMERGENCY)
Age: 61
Discharge: HOME | End: 2021-04-10
Attending: STUDENT IN AN ORGANIZED HEALTH CARE EDUCATION/TRAINING PROGRAM
Payer: COMMERCIAL

## 2021-04-10 ENCOUNTER — APPOINTMENT (OUTPATIENT)
Dept: GENERAL RADIOLOGY | Age: 61
End: 2021-04-10
Attending: STUDENT IN AN ORGANIZED HEALTH CARE EDUCATION/TRAINING PROGRAM
Payer: COMMERCIAL

## 2021-04-10 DIAGNOSIS — E87.6 HYPOKALEMIA: Primary | ICD-10-CM

## 2021-04-10 DIAGNOSIS — R11.2 NON-INTRACTABLE VOMITING WITH NAUSEA, UNSPECIFIED VOMITING TYPE: ICD-10-CM

## 2021-04-10 DIAGNOSIS — R06.00 DYSPNEA, UNSPECIFIED TYPE: ICD-10-CM

## 2021-04-10 LAB
ALBUMIN SERPL-MCNC: 3.9 G/DL (ref 3.5–5)
ALBUMIN/GLOB SERPL: 1.3 {RATIO} (ref 1.1–2.2)
ALP SERPL-CCNC: 73 U/L (ref 45–117)
ALT SERPL-CCNC: 36 U/L (ref 12–78)
ANION GAP SERPL CALC-SCNC: 12 MMOL/L (ref 5–15)
AST SERPL-CCNC: 20 U/L (ref 15–37)
BASOPHILS # BLD: 0.2 K/UL (ref 0–0.1)
BASOPHILS NFR BLD: 1 % (ref 0–1)
BILIRUB SERPL-MCNC: 0.5 MG/DL (ref 0.2–1)
BUN SERPL-MCNC: 19 MG/DL (ref 6–20)
BUN/CREAT SERPL: 18 (ref 12–20)
CALCIUM SERPL-MCNC: 9 MG/DL (ref 8.5–10.1)
CHLORIDE SERPL-SCNC: 106 MMOL/L (ref 97–108)
CO2 SERPL-SCNC: 20 MMOL/L (ref 21–32)
COMMENT, HOLDF: NORMAL
CREAT SERPL-MCNC: 1.06 MG/DL (ref 0.55–1.02)
DIFFERENTIAL METHOD BLD: ABNORMAL
EOSINOPHIL # BLD: 0 K/UL (ref 0–0.4)
EOSINOPHIL NFR BLD: 0 % (ref 0–7)
ERYTHROCYTE [DISTWIDTH] IN BLOOD BY AUTOMATED COUNT: 12.9 % (ref 11.5–14.5)
GLOBULIN SER CALC-MCNC: 3.1 G/DL (ref 2–4)
GLUCOSE SERPL-MCNC: 199 MG/DL (ref 65–100)
HCT VFR BLD AUTO: 44.7 % (ref 35–47)
HGB BLD-MCNC: 15.6 G/DL (ref 11.5–16)
IMM GRANULOCYTES # BLD AUTO: 0 K/UL
IMM GRANULOCYTES NFR BLD AUTO: 0 %
LYMPHOCYTES # BLD: 1.4 K/UL (ref 0.8–3.5)
LYMPHOCYTES NFR BLD: 9 % (ref 12–49)
MCH RBC QN AUTO: 29 PG (ref 26–34)
MCHC RBC AUTO-ENTMCNC: 34.9 G/DL (ref 30–36.5)
MCV RBC AUTO: 83.1 FL (ref 80–99)
MONOCYTES # BLD: 0.6 K/UL (ref 0–1)
MONOCYTES NFR BLD: 4 % (ref 5–13)
NEUTS BAND NFR BLD MANUAL: 4 % (ref 0–6)
NEUTS SEG # BLD: 13.1 K/UL (ref 1.8–8)
NEUTS SEG NFR BLD: 82 % (ref 32–75)
NRBC # BLD: 0 K/UL (ref 0–0.01)
NRBC BLD-RTO: 0 PER 100 WBC
PLATELET # BLD AUTO: 328 K/UL (ref 150–400)
PMV BLD AUTO: 10.3 FL (ref 8.9–12.9)
POTASSIUM SERPL-SCNC: 2.8 MMOL/L (ref 3.5–5.1)
PROT SERPL-MCNC: 7 G/DL (ref 6.4–8.2)
RBC # BLD AUTO: 5.38 M/UL (ref 3.8–5.2)
RBC MORPH BLD: ABNORMAL
SAMPLES BEING HELD,HOLD: NORMAL
SODIUM SERPL-SCNC: 138 MMOL/L (ref 136–145)
TROPONIN I SERPL-MCNC: <0.05 NG/ML
WBC # BLD AUTO: 15.3 K/UL (ref 3.6–11)

## 2021-04-10 PROCEDURE — 74011250636 HC RX REV CODE- 250/636: Performed by: STUDENT IN AN ORGANIZED HEALTH CARE EDUCATION/TRAINING PROGRAM

## 2021-04-10 PROCEDURE — 96375 TX/PRO/DX INJ NEW DRUG ADDON: CPT

## 2021-04-10 PROCEDURE — 99284 EMERGENCY DEPT VISIT MOD MDM: CPT

## 2021-04-10 PROCEDURE — 84484 ASSAY OF TROPONIN QUANT: CPT

## 2021-04-10 PROCEDURE — 74011250636 HC RX REV CODE- 250/636: Performed by: EMERGENCY MEDICINE

## 2021-04-10 PROCEDURE — 85025 COMPLETE CBC W/AUTO DIFF WBC: CPT

## 2021-04-10 PROCEDURE — 77030013140 HC MSK NEB VYRM -A

## 2021-04-10 PROCEDURE — 94640 AIRWAY INHALATION TREATMENT: CPT

## 2021-04-10 PROCEDURE — 96374 THER/PROPH/DIAG INJ IV PUSH: CPT

## 2021-04-10 PROCEDURE — 80053 COMPREHEN METABOLIC PANEL: CPT

## 2021-04-10 PROCEDURE — 74011250637 HC RX REV CODE- 250/637: Performed by: STUDENT IN AN ORGANIZED HEALTH CARE EDUCATION/TRAINING PROGRAM

## 2021-04-10 PROCEDURE — 83690 ASSAY OF LIPASE: CPT

## 2021-04-10 PROCEDURE — 93005 ELECTROCARDIOGRAM TRACING: CPT

## 2021-04-10 PROCEDURE — 74011000250 HC RX REV CODE- 250: Performed by: STUDENT IN AN ORGANIZED HEALTH CARE EDUCATION/TRAINING PROGRAM

## 2021-04-10 PROCEDURE — 71045 X-RAY EXAM CHEST 1 VIEW: CPT

## 2021-04-10 RX ORDER — POTASSIUM CHLORIDE 1500 MG/1
20 TABLET, FILM COATED, EXTENDED RELEASE ORAL 2 TIMES DAILY
Qty: 10 TAB | Refills: 0 | Status: SHIPPED | OUTPATIENT
Start: 2021-04-10 | End: 2021-04-15

## 2021-04-10 RX ORDER — PROCHLORPERAZINE EDISYLATE 5 MG/ML
10 INJECTION INTRAMUSCULAR; INTRAVENOUS
Status: COMPLETED | OUTPATIENT
Start: 2021-04-10 | End: 2021-04-10

## 2021-04-10 RX ORDER — POTASSIUM CHLORIDE 750 MG/1
40 TABLET, FILM COATED, EXTENDED RELEASE ORAL
Status: COMPLETED | OUTPATIENT
Start: 2021-04-10 | End: 2021-04-10

## 2021-04-10 RX ORDER — ONDANSETRON 2 MG/ML
4 INJECTION INTRAMUSCULAR; INTRAVENOUS
Status: COMPLETED | OUTPATIENT
Start: 2021-04-10 | End: 2021-04-10

## 2021-04-10 RX ORDER — IPRATROPIUM BROMIDE AND ALBUTEROL SULFATE 2.5; .5 MG/3ML; MG/3ML
3 SOLUTION RESPIRATORY (INHALATION)
Status: COMPLETED | OUTPATIENT
Start: 2021-04-10 | End: 2021-04-10

## 2021-04-10 RX ORDER — ONDANSETRON 4 MG/1
4 TABLET, ORALLY DISINTEGRATING ORAL
Qty: 20 TAB | Refills: 0 | Status: SHIPPED | OUTPATIENT
Start: 2021-04-10 | End: 2021-04-16

## 2021-04-10 RX ADMIN — IPRATROPIUM BROMIDE AND ALBUTEROL SULFATE 3 ML: .5; 2.5 SOLUTION RESPIRATORY (INHALATION) at 22:49

## 2021-04-10 RX ADMIN — SODIUM CHLORIDE 1000 ML: 9 INJECTION, SOLUTION INTRAVENOUS at 22:51

## 2021-04-10 RX ADMIN — PROCHLORPERAZINE EDISYLATE 10 MG: 5 INJECTION INTRAMUSCULAR; INTRAVENOUS at 21:48

## 2021-04-10 RX ADMIN — FAMOTIDINE 20 MG: 10 INJECTION, SOLUTION INTRAVENOUS at 22:51

## 2021-04-10 RX ADMIN — POTASSIUM CHLORIDE 40 MEQ: 750 TABLET, FILM COATED, EXTENDED RELEASE ORAL at 22:51

## 2021-04-10 RX ADMIN — ONDANSETRON 4 MG: 2 INJECTION INTRAMUSCULAR; INTRAVENOUS at 22:50

## 2021-04-11 DIAGNOSIS — I10 ESSENTIAL HYPERTENSION: ICD-10-CM

## 2021-04-11 LAB — LIPASE SERPL-CCNC: 99 U/L (ref 73–393)

## 2021-04-11 NOTE — ED PROVIDER NOTES
HISTORY OF PRESENT ILLNESS:  61 y.o. female with extensive past medical history, please see list, significant for Allergies, postnasal drip, chronic shortness of breath and recurrent vomiting, currently following with cardiology, pulmonology, neurology and neurosurgery. presents with chief complaint of vomiting, shortness of breath. Patient states that she was resting and suddenly began vomiting, nonbloody emesis. Denies diarrhea. Denies abdominal pain on multiple questioning. She has had chronic shortness of breath for months. She relates this to allergies, is currently being evaluated by pulmonology, states that she has multiple seasonal allergies. Location: Chest discomfort after coughing  Quality: Achy  Severity: Moderate  Duration: Recurrent, this episode 3 hours  Context: After coughing  Modifying factors: No improving or worsening factors  Associated symptoms: Vomiting, dyspnea    10:48 PM she had already received Compazine prior to my arrival, she states that her symptoms are much improved and she essentially feels back to baseline now  There are no other acute medical concerns at this time. Chart Review: mri 3/2019  15 mm extra-axial parafalcine lesion along the right frontal  convexity new since prior study. Signal characteristics suggest this could  represent a dermoid/epidermoid cyst. Other masses such as meningioma or  arachnoid cyst unli    PCP: Martha Espana MD      No current facility-administered medications on file prior to encounter. Current Outpatient Medications on File Prior to Encounter   Medication Sig Dispense Refill    busPIRone (BUSPAR) 10 mg tablet Take 1 tablet by mouth nightly. 30 Tab 5    guaifenesin (MUCINEX PO) Take  by mouth as needed.  clobetasoL (Temovate) 0.05 % ointment Apply  to affected area nightly. To arms and hands      mometasone (Elocon) 0.1 % ointment Apply  to affected area nightly.       ascorbic acid, vitamin C, (VITAMIN C) 500 mg tablet Take 500 mg by mouth daily.  vitamin Y-K-O-lutein-minerals (OCUVITE) tablet Take 1 Tab by mouth nightly.  fluticasone propionate (Flonase Allergy Relief) 50 mcg/actuation nasal spray 2 Sprays by Both Nostrils route daily as needed for Allergies.  fluticasone propion-salmeteroL (Wixela Inhub) 250-50 mcg/dose diskus inhaler Take 1 Puff by inhalation every twelve (12) hours.  omeprazole (PRILOSEC) 20 mg capsule Take 20 mg by mouth nightly.  hydroCHLOROthiazide (HYDRODIURIL) 25 mg tablet Take 1 Tab by mouth daily. 90 Tab 1    albuterol (PROVENTIL HFA, VENTOLIN HFA, PROAIR HFA) 90 mcg/actuation inhaler Take 2 Puffs by inhalation every four (4) hours as needed for Shortness of Breath.  montelukast (SINGULAIR) 10 mg tablet Take 10 mg by mouth nightly.  traZODone (DESYREL) 50 mg tablet Take 2 Tabs by mouth nightly. 60 Tab 5    donepeziL (ARICEPT) 10 mg tablet TAKE 1 TABLET BY MOUTH EVERY DAY AT NIGHT 90 Tab 1    rosuvastatin (CRESTOR) 10 mg tablet Take 1 Tab by mouth nightly.  80 Tab 1        PAST MEDICAL HISTORY:    Past Medical History:   Diagnosis Date    Anxiety     Desmoid fibromatosis     chest    Dysfunctional uterine bleeding 8/17/2011    GERD (gastroesophageal reflux disease)     Hyperlipemia     Hypertension     Insomnia     severe, sleep study normal 2011    Migraine 2/24/2012        PAST SURGICAL HISTORY:     Past Surgical History:   Procedure Laterality Date    COLONOSCOPY N/A 7/31/2020    [facility] END[facility]COPY    ENDOSCOPY, COLON, DIAGNOSTIC  5/2010    HX BREAST BIOPSY Right     benign    HX GYN      d and c    HX OTHER SURGICAL      tumor removal in chest removed    DE CHEST SURGERY PROCEDURE UNLISTED      desmoid tumor        FAMILY HISTORY:    Family History   Problem Relation Age of Onset    Thyroid Disease Mother         goiter    Hypertension Father     Stroke Father     Thyroid Disease Sister         nodules    Hypertension Sister     Cancer Maternal Grandmother         polycythemia    Cancer Paternal Grandmother         breast    Breast Cancer Paternal Grandmother     Cancer Paternal Grandfather         lung cancer    Other Sister         lyme disease    No Known Problems Sister         SOCIAL HISTORY:    Social History     Tobacco Use    Smoking status: Never Smoker    Smokeless tobacco: Never Used   Substance Use Topics    Alcohol use: Yes     Comment: rarely    Drug use: No        ALLERGIES:    Allergies   Allergen Reactions    Erythromycin Nausea and Vomiting     Cannot keep erythromycin down.  Lisinopril Other (comments)     \"margoth achy\"    Other Plant, Animal, Environmental Other (comments)     hayfever        REVIEW OF SYSTEMS:   Review of Systems   Constitutional: Negative for chills, fatigue and fever. Eyes: Negative for photophobia. Respiratory: Positive for cough and wheezing. Negative for shortness of breath. Cardiovascular: Negative for chest pain and leg swelling. Gastrointestinal: Positive for nausea and vomiting. Negative for abdominal pain and diarrhea. Genitourinary: Negative for dysuria. Musculoskeletal: Negative for back pain. Neurological: Negative for light-headedness and headaches. Psychiatric/Behavioral: Negative for confusion. All other systems reviewed and are negative. PHYSICAL EXAM:   Physical Exam  Vitals signs reviewed. Constitutional:       General: She is not in acute distress. Appearance: She is not ill-appearing or toxic-appearing. HENT:      Head: Normocephalic. Mouth/Throat:      Mouth: Mucous membranes are moist.   Cardiovascular:      Rate and Rhythm: Normal rate and regular rhythm. Pulmonary:      Effort: Pulmonary effort is normal. No respiratory distress. Breath sounds: No stridor. Wheezing (  Bronchospastic cough) present. No rhonchi. Abdominal:      General: Abdomen is flat.  Bowel sounds are normal. There is no distension or abdominal bruit. Palpations: Abdomen is soft. Tenderness: There is no abdominal tenderness. There is no right CVA tenderness, left CVA tenderness, guarding or rebound. Hernia: No hernia is present. Skin:     General: Skin is warm. Capillary Refill: Capillary refill takes less than 2 seconds. Coloration: Skin is not cyanotic, mottled or pale. Neurological:      General: No focal deficit present. Mental Status: She is alert and oriented to person, place, and time. Cranial Nerves: No cranial nerve deficit. Motor: No weakness. Psychiatric:         Mood and Affect: Mood normal.          LABORATORY TESTS:  Labs Reviewed   CBC WITH AUTOMATED DIFF - Abnormal; Notable for the following components:       Result Value    WBC 15.3 (*)     RBC 5.38 (*)     NEUTROPHILS 82 (*)     LYMPHOCYTES 9 (*)     MONOCYTES 4 (*)     ABS. NEUTROPHILS 13.1 (*)     ABS. BASOPHILS 0.2 (*)     All other components within normal limits   METABOLIC PANEL, COMPREHENSIVE - Abnormal; Notable for the following components:    Potassium 2.8 (*)     CO2 20 (*)     Glucose 199 (*)     Creatinine 1.06 (*)     GFR est non-AA 53 (*)     All other components within normal limits   SAMPLES BEING HELD   TROPONIN I   LIPASE       IMAGING RESULTS:  XR CHEST PORT   Final Result   No acute process identified.                  EK   Normal sinus, ventricular rate 98, normal axis, no ST elevation or depression    MEDICATIONS GIVEN:  Medications   prochlorperazine (COMPAZINE) injection 10 mg (10 mg IntraVENous Given 4/10/21 2148)   albuterol-ipratropium (DUO-NEB) 2.5 MG-0.5 MG/3 ML (3 mL Nebulization Given 4/10/21 2249)   potassium chloride SR (KLOR-CON 10) tablet 40 mEq (40 mEq Oral Given 4/10/21 2251)   sodium chloride 0.9 % bolus infusion 1,000 mL (0 mL IntraVENous IV Completed 4/10/21 2318)   ondansetron (ZOFRAN) injection 4 mg (4 mg IntraVENous Given 4/10/21 2250)   famotidine (PF) (PEPCID) 20 mg in 0.9% sodium chloride 10 mL injection (20 mg IntraVENous Given 4/10/21 4943)       CONSULTS:  Discussed with family at bedside    PROGRESS NOTE:   10:50 PM Patient's symptoms have improved after treatment    MEDICAL DECISION MAKIN y.o. female presents with cough, vomiting  Differential diagnosis includes but not limited to: Pneumonia, less likely Covid 19 (vaccinated), heart failure, cough related emesis. She has undergone a long work-up involving multiple services. She states that the symptoms are essentially an exacerbation of chronic symptoms related to it as yet undiagnosed illness. That said she does not have clear symptoms of an acute medical emergency. After she was treated with Compazine and fluids she states that she feels much better and she is now to pursue inpatient evaluation of the symptoms. Given her ongoing close follow-up I think this is reasonable. Encouraged her to return in case she has any recurrent or worsening symptoms    IMPRESSION:  1. Hypokalemia    2. Non-intractable vomiting with nausea, unspecified vomiting type    3. Dyspnea, unspecified type        PLAN:  -   Discharge  Discharge Medication List as of 4/10/2021 11:41 PM      START taking these medications    Details   potassium chloride SR (K-TAB) 20 mEq tablet Take 1 Tab by mouth two (2) times a day for 5 days. , Normal, Disp-10 Tab, R-0      ondansetron (Zofran ODT) 4 mg disintegrating tablet 1 Tab by SubLINGual route every eight (8) hours as needed for Nausea or Vomiting., Normal, Disp-20 Tab, R-0         CONTINUE these medications which have NOT CHANGED    Details   busPIRone (BUSPAR) 10 mg tablet Take 1 tablet by mouth nightly., Normal, Disp-30 Tab, R-5      guaifenesin (MUCINEX PO) Take  by mouth as needed., Historical Med      clobetasoL (Temovate) 0.05 % ointment Apply  to affected area nightly.  To arms and hands, Historical Med      mometasone (Elocon) 0.1 % ointment Apply  to affected area nightly., Historical Med      ascorbic acid, vitamin C, (VITAMIN C) 500 mg tablet Take 500 mg by mouth daily. , Historical Med      vitamin C-W-S-lutein-minerals (OCUVITE) tablet Take 1 Tab by mouth nightly., Historical Med      fluticasone propionate (Flonase Allergy Relief) 50 mcg/actuation nasal spray 2 Sprays by Both Nostrils route daily as needed for Allergies. , Historical Med      fluticasone propion-salmeteroL (Wixela Inhub) 250-50 mcg/dose diskus inhaler Take 1 Puff by inhalation every twelve (12) hours. , Historical Med      omeprazole (PRILOSEC) 20 mg capsule Take 20 mg by mouth nightly., Historical Med      hydroCHLOROthiazide (HYDRODIURIL) 25 mg tablet Take 1 Tab by mouth daily. , Normal, Disp-90 Tab, R-1      albuterol (PROVENTIL HFA, VENTOLIN HFA, PROAIR HFA) 90 mcg/actuation inhaler Take 2 Puffs by inhalation every four (4) hours as needed for Shortness of Breath., Historical Med      montelukast (SINGULAIR) 10 mg tablet Take 10 mg by mouth nightly., Historical Med      traZODone (DESYREL) 50 mg tablet Take 2 Tabs by mouth nightly., Normal, Disp-60 Tab, R-5      losartan (COZAAR) 100 mg tablet Take 1 Tab by mouth daily. , Normal, Disp-90 Tab, R-1      donepeziL (ARICEPT) 10 mg tablet TAKE 1 TABLET BY MOUTH EVERY DAY AT NIGHT, Normal, Disp-90 Tab, R-1      rosuvastatin (CRESTOR) 10 mg tablet Take 1 Tab by mouth nightly., Normal, Disp-90 Tab, R-1           Follow-up Information     Follow up With Specialties Details Why Contact Info    Mary Cannon MD Internal Medicine Schedule an appointment as soon as possible for a visit in 3 days  [address] [facility]  [phone]      Please schedule follow-up appoint with your pulmonologist.            Return precautions given      Andie Lu MD

## 2021-04-11 NOTE — ED TRIAGE NOTES
EMS report: 30 min prior to call vomiting x7, diffuse abdominal pain, coughing/chest pain, complaints of allergies. 4mg zofran 2108hrs PTA    \"Been sick since January and has been seen by cardiolgy, neurology.

## 2021-04-12 ENCOUNTER — PATIENT OUTREACH (OUTPATIENT)
Dept: OTHER | Age: 61
End: 2021-04-12

## 2021-04-12 LAB
ATRIAL RATE: 98 BPM
CALCULATED P AXIS, ECG09: 47 DEGREES
CALCULATED R AXIS, ECG10: 45 DEGREES
CALCULATED T AXIS, ECG11: 21 DEGREES
DIAGNOSIS, 93000: NORMAL
P-R INTERVAL, ECG05: 132 MS
Q-T INTERVAL, ECG07: 406 MS
QRS DURATION, ECG06: 102 MS
QTC CALCULATION (BEZET), ECG08: 518 MS
VENTRICULAR RATE, ECG03: 98 BPM

## 2021-04-12 RX ORDER — LOSARTAN POTASSIUM 100 MG/1
100 TABLET ORAL DAILY
Qty: 90 TAB | Refills: 1 | Status: SHIPPED | OUTPATIENT
Start: 2021-04-12

## 2021-04-12 NOTE — PROGRESS NOTES
CCM Outreach    Pt was seen at [facility] ER 4/10/21    Hypokalemia    Non-intractable vomiting with nausea, unspecified vomiting type    Dyspnea, unspecified type      Goals      Attends follow-up appointments as directed. Future Appointments   Date Time Provider Dana Guajardoi   3/29/2021 12:30 PM [facility]   3/30/2021 10:00 AM [facility] BS AMB   3/30/2021 10:15 AM EEG SCHEDULE [facility] BS AMB   4/13/2021 12:30 PM Martin Nelson PsyD [facility] BS AMB   4/16/2021  8:00 AM Britany Cannon MD [facility] BS AMB   5/14/2021 10:45 AM [facility] YESY 1 [facility]     3/29/21   PCP - has not scheduled an earlier appt, but will call today  Pulmonary/allergist - need to call and schedule F/U appt TBD  MRI head - completed today    4/12/21 Call placed to patient, no answer. Voicemail left to return call. Pt was seen at [facility] ER 4/10/21         Care Coordination related to Memory Loss (pt-stated)      Knowledge and adherence of prescribed medication (ie. action, side effects, missed dose, etc.). Taking prescribed meds       Supportive resources in place to maintain patient in the community (ie. Home Health, DME equipment, refer to, medication assistant plan, etc.)      [facility] - # [phone] 24/7  Nurse Access line 24/7  Be Well  [address] [employer] [place] [address] Urgent [facility] [phone]  HR Service Now - [facility] Workday  IT - [phone]  [facility] Help - 1- [phone]  Associate Services for advice and direction, by calling [phone] and pressing 1         Understands red flags post discharge.       CP, SOB, cough, wheeze, temp >100    3/26/2021  6:08 PM - Huang, Lab In [facility]    Component Value Flag Ref Range Units Status   pH 7.48  High   7.35 - 7.45   Final   PCO2 32  Low   35 - 45 mmHg Final   PO2 77  Low   80 - 100 mmHg Final   O2 SAT 96   92 - 97 % Final   BICARBONATE 23   22 - 26 mmol/L Final   BASE EXCESS 0.6    mmol/L Final   O2 METHOD ROOM AIR      Final   Sample source ARTERIAL      Final   SITE RIGHT RADIAL      Final   NYDIA'S TEST YES      Final     3/29/21 reported that she continues to have SOB, minimal improvment. Pt also reported having nausea, which has been going on for weeks. Pt will address with PCP.              CM will f/u in 1 day

## 2021-04-14 ENCOUNTER — PATIENT OUTREACH (OUTPATIENT)
Dept: OTHER | Age: 61
End: 2021-04-14

## 2021-04-14 NOTE — PROGRESS NOTES
CCM Outreach     Call placed to pt, Verified  and address for HIPAA security. Pt was noted seen at [facility] ER 4/10/21    Diagnosis Comment   Hypokalemia    Non-intractable vomiting with nausea, unspecified vomiting type    Dyspnea, unspecified type      Pt did not call nurse access line prior to going. Reminded pt again about importance of calling first in order to avoid a higher co-pay. Reminded pt again of after hour/weekend resources. Goals      Attends follow-up appointments as directed. Future Appointments   Date Time Provider Dana Hilliard   3/29/2021 12:30 PM [facility]   3[facility]30/2021 10:00 AM [facility]   3/30/2021 10:15 [facility]   2021 12:30 PM Martin Nelson PsyD [facility]   2021  8:00 AM Price Cannon MD [facility] BS AMB   2021 10:45 AM [facility] 1 [facility]     3/29/21   PCP - has not scheduled an earlier appt, but will call today  Pulmonary/allergist - need to call and schedule F/U appt TBD  MRI head - completed today    21 Call placed to patient, no answer. Voicemail left to return call. Pt was seen at [facility] ER 4/10/21    4/14/21  PCP - 21   Pulmonary - TBD - advised to schedule F/U appt ASAP  Future Appointments     Provider Dana Hilliard   2021 10:00 AM Mansoor Cleary MD [facility] BS AMB   2021 10:45 AM (Arrive by 10:30 AM) [facility] YESY [address] at: First floor imagining  [address]   2021 8:00 AM Price Cannon MD [facility] BS AMB            Care Coordination related to Memory Loss (pt-stated)      21 denies H/A's, but does report lightheadedness        Knowledge and adherence of prescribed medication (ie. action, side effects, missed dose, etc.).       Taking prescribed meds    21 taking prescribed meds, but does voice concerns that ongoing s/s that she has been experiencing maybe related to \"too many medications. \" Pt will discuss further with provider. Advised pt not to d/c meds until she has been advised by provider, agreed.  Supportive resources in place to maintain patient in the community (ie. Home Health, DME equipment, refer to, medication assistant plan, etc.)      [facility] - # [phone] 24/7  Nurse Access line 24/7  Be Well  [address] Urgent [facility] [phone]  HR Service Now - [facility] Workday  IT - [phone]  MyChart Help - 1- [phone]  Associate Services for advice and direction, by calling [phone] and pressing 1         Understands red flags post discharge. CP, SOB, cough, wheeze, temp >100    3/26/2021  6:08 PM - Huang, Lab In [facility]    Component Value Flag Ref Range Units Status   pH 7.48  High   7.35 - 7.45   Final   PCO2 32  Low   35 - 45 mmHg Final   PO2 77  Low   80 - 100 mmHg Final   O2 SAT 96   92 - 97 % Final   BICARBONATE 23   22 - 26 mmol/L Final   BASE EXCESS 0.6    mmol/L Final   O2 METHOD ROOM AIR      Final   Sample source ARTERIAL      Final   SITE RIGHT RADIAL      Final   NYDIA'S TEST YES      Final     3/29/21 reported that she continues to have SOB, minimal improvement. Pt also reported having nausea, which has been going on for weeks. Pt will address with PCP.     4/14/21 reported nausea is constant. Pt reported appetite is fair, only snacks during the day. Pt reported having 5 lose/soft stools a day, but voices that she \"prefers that instead of constipation. \" Denies taking laxative. Pt reported \"pressure\"in chest constant for a very long time. Denies temp.            CM will f/u in 2 weeks

## 2021-04-15 ENCOUNTER — TELEPHONE (OUTPATIENT)
Dept: NEUROLOGY | Age: 61
End: 2021-04-15

## 2021-04-15 NOTE — TELEPHONE ENCOUNTER
----- Message from José Rodgers sent at 4/15/2021  2:55 PM EDT -----  Regarding: REGINA Mclaughlin/Telephone  Caller's first and last name: Moraima Nurse (spouse)    Reason for call: MRI results (with & w.o contrast)    Callback required yes/no and why: Yes, to confirm    Best contact number(s): [phone]    Details to clarify the request:MRI results (with contrast & w.o contrast) needs to be sent out to Dr.Peter Ferguson's office ([facility]) for the PT's upcoming appt there.  O:[phone]

## 2021-04-16 ENCOUNTER — OFFICE VISIT (OUTPATIENT)
Dept: INTERNAL MEDICINE CLINIC | Age: 61
End: 2021-04-16
Payer: COMMERCIAL

## 2021-04-16 ENCOUNTER — TELEPHONE (OUTPATIENT)
Dept: INTERNAL MEDICINE CLINIC | Age: 61
End: 2021-04-16

## 2021-04-16 VITALS
DIASTOLIC BLOOD PRESSURE: 79 MMHG | HEART RATE: 82 BPM | SYSTOLIC BLOOD PRESSURE: 115 MMHG | HEIGHT: 62 IN | TEMPERATURE: 97.4 F | BODY MASS INDEX: 34.82 KG/M2 | OXYGEN SATURATION: 96 % | RESPIRATION RATE: 20 BRPM | WEIGHT: 189.2 LBS

## 2021-04-16 DIAGNOSIS — G31.84 MCI (MILD COGNITIVE IMPAIRMENT): ICD-10-CM

## 2021-04-16 DIAGNOSIS — I10 ESSENTIAL HYPERTENSION: Primary | ICD-10-CM

## 2021-04-16 DIAGNOSIS — F33.0 DEPRESSION, MAJOR, RECURRENT, MILD (HCC): ICD-10-CM

## 2021-04-16 DIAGNOSIS — J30.1 SEASONAL ALLERGIC RHINITIS DUE TO POLLEN: ICD-10-CM

## 2021-04-16 PROBLEM — E66.01 SEVERE OBESITY (BMI 35.0-39.9) WITH COMORBIDITY (HCC): Status: RESOLVED | Noted: 2018-04-30 | Resolved: 2021-04-16

## 2021-04-16 PROCEDURE — 99215 OFFICE O/P EST HI 40 MIN: CPT | Performed by: INTERNAL MEDICINE

## 2021-04-16 RX ORDER — ESCITALOPRAM OXALATE 10 MG/1
10 TABLET ORAL DAILY
Qty: 30 TAB | Refills: 3 | Status: SHIPPED | OUTPATIENT
Start: 2021-04-16 | End: 2021-05-20

## 2021-04-16 RX ORDER — MONTELUKAST SODIUM 10 MG/1
10 TABLET ORAL
Qty: 30 TAB | Refills: 3 | Status: SHIPPED | OUTPATIENT
Start: 2021-04-16 | End: 2021-09-29

## 2021-04-16 RX ORDER — BUSPIRONE HYDROCHLORIDE 10 MG/1
10 TABLET ORAL 2 TIMES DAILY
Qty: 60 TAB | Refills: 5 | Status: SHIPPED | OUTPATIENT
Start: 2021-04-16 | End: 2021-09-29

## 2021-04-16 NOTE — PROGRESS NOTES
Noah Dee (: 1960) is a 61 y.o. female, established patient, here for evaluation of the following chief complaint(s):  Follow-up (nausea)       ASSESSMENT/PLAN:  1. Essential hypertension  BP is at goal. I do not recommend any change in Losartan or HCTZ. 2. Depression, major, recurrent, mild ([address]  Not at goal. Reviewed Dr. Abhijeet Moreno office note which states that pt has Mild Cognitive Impairment with Memory Loss. Dr. Veronique Carty suggests consideration for medication for memory as well as psychiatric treatment and counseling for mood related concerns and asked to f/u in 6 months for retesting. Discussed with pt that her mild memory loss is compounded with anxiety, depression, and difficulty focusing. Had conversation with pt that addressing anxiety and depression will help with memory component. Explained that she needs to comply with Buspar every night for it to build in her system for a couple of weeks. Informed pt that complying with Buspar BID may help address circular thoughts and may help with sleep. Directed pt to start complying with Buspar BID for a couple of weeks and monitor for changes or improvement. Had conversation with pt that I had her on Lexapro in the past which she tolerated well. Discussed with pt that I want to restart her on Lexapro to address depression component of her mood. Will continue to monitor for improvements or changes. -     busPIRone (BUSPAR) 10 mg tablet; Take 1 Tab by mouth two (2) times a day. Take 1 tablet by mouth nightly., Normal, Disp-60 Tab, R-5    3. MCI (mild cognitive impairment)  Not at goal. Directed pt to continue on Aricept. Had conversation with pt about her ability to go back to work. Explained that Dr. Kristofer Kaopor says that her memory issue will definitely impact her ability to safely and effectively work without extra time to complete tasks, repetition, repeated instructions, written reminders, and reduced distraction.  Informed pt that seeking long term disability can take over a year. Discussed with pt that I can help her acquire short term disability given her memory testing results. Will continue to monitor for improvements or changes. 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 Discussed with pt that Dr. Pilar Metz will be able to provide more insight on if cyst needs to be removed. SUBJECTIVE/OBJECTIVE:  HPI  Hypertension ROS: taking medications as instructed, no medication side effects noted, no TIA's, no chest pain on exertion, no dyspnea on exertion, no swelling of ankles. New concerns: BP in office today is 115/79. Hyperlipidemia:  Cardiovascular risk analysis - 61 y.o. female LDL goal is under 130. ROS: taking medications as instructed, no medication side effects noted, no TIA's, no chest pain on exertion, no dyspnea on exertion, no swelling of ankles. Tolerating meds, no myalgias or other side effects noted. New concerns:   Lab Results   Component Value Date/Time    LDL, calculated 125 (H) 09/24/2020 10:09 AM       Mood: Pt reports that she complied with Buspar a couple of times at night and it did not provide relief in terms of sleep. Allergies: Stable, pt continues to comply with Flonase and Singulair. GERD: Stable, pt continues to comply with Prilosec PRN. Sleep: Not stable, pt continues to comply with Trazodone. Pt reports that she continues to have circular thought which interfere with sleep. Memory: Stable, pt continues to comply with Aricept. Pt reports that she always feels tired. Pt's  reports that pt's manager got frustrated with her because pt has a hard time learning new things or doing tasks not part of her normal routine. Pt states that she does not have her own disability insurance. Review of Systems   All other systems reviewed and are negative. Physical Exam  Constitutional:       Appearance: Normal appearance.    HENT:      Right Ear: Tympanic membrane and external ear normal.      Left Ear: Tympanic membrane and external ear normal.      Mouth/Throat:      Mouth: Mucous membranes are moist.      Pharynx: Oropharynx is clear. Cardiovascular:      Rate and Rhythm: Normal rate and regular rhythm. Pulses: Normal pulses. Heart sounds: Normal heart sounds. Pulmonary:      Effort: Pulmonary effort is normal.      Breath sounds: Normal breath sounds. Musculoskeletal: Normal range of motion. Skin:     General: Skin is warm and dry. Neurological:      General: No focal deficit present. Mental Status: She is alert and oriented to person, place, and time. Psychiatric:         Mood and Affect: Mood normal.         Behavior: Behavior normal.           On this date 04/16/2021 I have spent 45 minutes reviewing previous notes, test results and face to face with the patient discussing the diagnosis and importance of compliance with the treatment plan as well as documenting on the day of the visit. Lab results and schedule of future lab studies reviewed with patient. Reviewed diet, exercise and weight control. Written by Love Angulo, as dictated by Loren Tolentino MD.     Current diagnosis and concerns discussed with pt at length. Understands risks and benefits or current treatment plan and medications and accepts the treatment and medication with any possible risks. Pt asks appropriate questions which were answered. Pt instructed to call with any concerns or problems.

## 2021-04-16 NOTE — TELEPHONE ENCOUNTER
[phone]    Pharmacy called to clarify medication script for patient's Buspar medication was increased to 2 tabs daily.      Per nurse 2 tabs daily increase confirmed

## 2021-04-22 ENCOUNTER — TELEPHONE (OUTPATIENT)
Dept: NEUROLOGY | Age: 61
End: 2021-04-22

## 2021-04-22 NOTE — TELEPHONE ENCOUNTER
Discussed with Dr. Art Pelayo. She cancelled testing due to brain procedure. She will contact  and figure out what is being done.

## 2021-04-22 NOTE — TELEPHONE ENCOUNTER
It's very important that someone call him back to day regarding this patient. Please call [phone].

## 2021-04-23 ENCOUNTER — TELEPHONE (OUTPATIENT)
Dept: INTERNAL MEDICINE CLINIC | Age: 61
End: 2021-04-23

## 2021-04-27 ENCOUNTER — TELEPHONE (OUTPATIENT)
Dept: INTERNAL MEDICINE CLINIC | Age: 61
End: 2021-04-27

## 2021-04-27 ENCOUNTER — TRANSCRIBE ORDER (OUTPATIENT)
Dept: SCHEDULING | Age: 61
End: 2021-04-27

## 2021-04-27 DIAGNOSIS — G93.89 BRAIN MASS: Primary | ICD-10-CM

## 2021-04-27 NOTE — TELEPHONE ENCOUNTER
----- Message from Gloria Davidson sent at 4/27/2021 12:32 PM EDT -----  Regarding: MD Monisha/Telephone  General Message/Vendor Calls    Caller's first and last name: Charisse Ac, . Reason for call: Status of paperwork dropped off. Callback required yes/no and why:      Best contact number(s): [phone]      Details to clarify the request:  asking for status of paperwork that was dropped of in reference to disability. Asking if they do not answer phone to please leave a message with status.       Gloria Davidson

## 2021-04-29 ENCOUNTER — TELEPHONE (OUTPATIENT)
Dept: INTERNAL MEDICINE CLINIC | Age: 61
End: 2021-04-29

## 2021-04-29 NOTE — TELEPHONE ENCOUNTER
----- Message from [facility] sent at 4/29/2021  2:20 PM EDT -----  Regarding: Dr. Vivi Gordon Message/Vendor Calls    Caller's first and last name: [employer]      Reason for call: Checking the status of medical records request for pt      Callback required yes/no and why: Yes      Best contact number(s): [phone] ext [phone]      Details to clarify the request: [address] [place] [address]

## 2021-04-30 ENCOUNTER — OFFICE VISIT (OUTPATIENT)
Dept: CARDIOLOGY CLINIC | Age: 61
End: 2021-04-30
Payer: COMMERCIAL

## 2021-04-30 VITALS
WEIGHT: 194 LBS | SYSTOLIC BLOOD PRESSURE: 158 MMHG | BODY MASS INDEX: 35.7 KG/M2 | HEIGHT: 62 IN | HEART RATE: 88 BPM | OXYGEN SATURATION: 97 % | DIASTOLIC BLOOD PRESSURE: 98 MMHG

## 2021-04-30 DIAGNOSIS — R06.09 DOE (DYSPNEA ON EXERTION): ICD-10-CM

## 2021-04-30 DIAGNOSIS — R06.02 SOB (SHORTNESS OF BREATH): ICD-10-CM

## 2021-04-30 DIAGNOSIS — E78.5 DYSLIPIDEMIA: ICD-10-CM

## 2021-04-30 DIAGNOSIS — Z76.89 ESTABLISHING CARE WITH NEW DOCTOR, ENCOUNTER FOR: ICD-10-CM

## 2021-04-30 DIAGNOSIS — I10 ESSENTIAL HYPERTENSION: Primary | ICD-10-CM

## 2021-04-30 PROCEDURE — 99243 OFF/OP CNSLTJ NEW/EST LOW 30: CPT | Performed by: INTERNAL MEDICINE

## 2021-04-30 PROCEDURE — 93000 ELECTROCARDIOGRAM COMPLETE: CPT | Performed by: INTERNAL MEDICINE

## 2021-04-30 NOTE — PROGRESS NOTES
Chief Complaint   Patient presents with    Hypertension    Cholesterol Problem   [facility] Follow Up     ED 4/10= ABD PAIN     Visit Vitals  BP (!) 158/98 (BP 1 Location: Right upper arm, BP Patient Position: Sitting)   Pulse 88   Ht 5' 2\" (1.575 m)   Wt 194 lb (88 kg)   SpO2 97%   BMI 35.48 kg/m²     Chest pain denied   Palpations denied  SOB denied  Dizziness denied  Swelling in hands/feet denied   Recent hospital stays denied

## 2021-04-30 NOTE — TELEPHONE ENCOUNTER
Gabriela Gupta from [facility] called to check on status of records request. PSR informed him the request had been received and that it takes around 14 business days to get records.

## 2021-04-30 NOTE — PROGRESS NOTES
Reason for Consult: Shortness of breath. Referring: Nicole Edmond MD    HPI: Pearl Tran is a 61 y.o. female with past medical history significant for hypertension, is here for evaluation of symptoms of shortness of breath. Symptoms started when she had an attack of seasonal allergies. She was evaluated by Dr. Nalini Alva for increasing shortness of breath. At that time evaluation with the CT scan as well as PFTs demonstrated normal findings. She was diagnosed with acute bronchitis due to seasonal allergies and she was treated for those and since then her symptoms have with improved. At the same time they wanted to rule out if there was any cardiac cause of shortness of breath. There is no symptoms of chest pain, palpitations, lightheadedness or dizziness. Shortness of breath was mostly at rest or on mild exertion. Is currently she is doing some modest exertion and without having any significant shortness of breath. In the background she has been having symptoms of memory loss from 2017. This has mostly affected her short-term memory. 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 Neurosurgery has cleared her for further evaluation as there is no surgical intervention needed at this point as the cyst is benign and does not affect her memory. She does not have any family history of heart disease. Her blood pressure has been elevated but most recently medications were titrated but apparently she has stopped taking hydrochlorothiazide. EKG today demonstrated normal sinus rhythm, normal axis, normal intervals, normal ST segment. Plan:    1. Shortness of breath: This was likely due to acute bronchitis. Symptoms have been improving since the bronchitis has been resolving. To rule out cardiac causes will check echocardiogram for LV and valvular function.   At this time we will reserve stress test as symptoms have been improving. 2.  Hypertension: Blood pressure is elevated. Mistakenly she has stopped taking hydrochlorothiazide. She is currently on Cozaar. I have asked her to resume hydrochlorothiazide 25 mg p.o. daily along with Cozaar. Continue to monitor salt intake and watch diet for carbs and lose weight. 3. Obesity: Wt loss recommended. ATTENTION:   This medical record was transcribed using an electronic medical records/speech recognition system. Although proofread, it may and can contain electronic, spelling and other errors. Corrections may be executed at a later time. Please feel free to contact us for any clarifications as needed.       Past Medical History:   Diagnosis Date    Anxiety     Desmoid fibromatosis     chest    Dysfunctional uterine bleeding 8/17/2011    GERD (gastroesophageal reflux disease)     Hyperlipemia     Hypertension     Insomnia     severe, sleep study normal 2011    Migraine 2/24/2012            Past Surgical History:   Procedure Laterality Date    COLONOSCOPY N/A 7/31/2020    COLONOSCOPY performed by Denver Snow [facility] at [facility] ENDOSCOPY    ENDOSCOPY, COLON, DIAGNOSTIC  5/2010    HX BREAST BIOPSY Right     benign    HX GYN      d and c    HX OTHER SURGICAL      tumor removal in chest removed    [place] CHEST SURGERY PROCEDURE UNLISTED      desmoid tumor             Family History   Problem Relation Age of Onset    Thyroid Disease Mother         goiter    Hypertension Father     Stroke Father     Thyroid Disease Sister         nodules    Hypertension Sister     Cancer Maternal Grandmother         polycythemia    Cancer Paternal Grandmother         breast    Breast Cancer Paternal Grandmother     Cancer Paternal Grandfather         lung cancer    Other Sister         lyme disease    No Known Problems Sister            Social History     Socioeconomic History    Marital status:      Spouse name: Not on file    Number of children: Not on file    Years of education: Not on file    Highest education level: Not on file   Occupational History    Not on file   Social Needs    Financial resource strain: Not on file    Food insecurity     Worry: Not on file     Inability: Not on file    Transportation needs     Medical: Not on file     Non-medical: Not on file   Tobacco Use    Smoking status: Never Smoker    Smokeless tobacco: Never Used   Substance and Sexual Activity    Alcohol use: Yes     Comment: rarely    Drug use: No    Sexual activity: Yes     Partners: Male   Lifestyle    Physical activity     Days per week: Not on file     Minutes per session: Not on file    Stress: Not on file   Relationships    Social connections     Talks on phone: Not on file     Gets together: Not on file     Attends Anabaptist service: Not on file     Active member of club or organization: Not on file     Attends meetings of clubs or organizations: Not on file     Relationship status: Not on file    Intimate partner violence     Fear of current or ex partner: Not on file     Emotionally abused: Not on file     Physically abused: Not on file     Forced sexual activity: Not on file   Other Topics Concern    Not on file   Social History Narrative    Not on file         Allergies   Allergen Reactions    Erythromycin Nausea and Vomiting     Cannot keep erythromycin down.  Lisinopril Other (comments)     \"margoth achy\"    Other Plant, Animal, Environmental Other (comments)     hayfever            Current Outpatient Medications   Medication Sig Dispense Refill    busPIRone (BUSPAR) 10 mg tablet Take 1 Tab by mouth two (2) times a day. Take 1 tablet by mouth nightly. 60 Tab 5    montelukast (SINGULAIR) 10 mg tablet Take 1 Tab by mouth nightly. 30 Tab 3    losartan (COZAAR) 100 mg tablet Take 1 Tab by mouth daily. 90 Tab 1    guaifenesin (MUCINEX PO) Take  by mouth as needed.  clobetasoL (Temovate) 0.05 % ointment Apply  to affected area nightly.  To arms and hands  mometasone (Elocon) 0.1 % ointment Apply  to affected area nightly.  ascorbic acid, vitamin C, (VITAMIN C) 500 mg tablet Take 500 mg by mouth daily.  vitamin J-J-X-lutein-minerals (OCUVITE) tablet Take 1 Tab by mouth nightly.  fluticasone propionate (Flonase Allergy Relief) 50 mcg/actuation nasal spray 2 Sprays by Both Nostrils route daily as needed for Allergies.  fluticasone propion-salmeteroL (Wixela Inhub) 250-50 mcg/dose diskus inhaler Take 1 Puff by inhalation every twelve (12) hours.  omeprazole (PRILOSEC) 20 mg capsule Take 20 mg by mouth nightly.  albuterol (PROVENTIL HFA, VENTOLIN HFA, PROAIR HFA) 90 mcg/actuation inhaler Take 2 Puffs by inhalation every four (4) hours as needed for Shortness of Breath.  donepeziL (ARICEPT) 10 mg tablet TAKE 1 TABLET BY MOUTH EVERY DAY AT NIGHT 90 Tab 1    rosuvastatin (CRESTOR) 10 mg tablet Take 1 Tab by mouth nightly. 90 Tab 1    escitalopram oxalate (LEXAPRO) 10 mg tablet Take 1 Tab by mouth daily. 30 Tab 3    hydroCHLOROthiazide (HYDRODIURIL) 25 mg tablet Take 1 Tab by mouth daily. 90 Tab 1        ROS:  12 point review of systems was performed.  All negative except for HPI     Physical Exam:  Visit Vitals  BP (!) 158/98 (BP 1 Location: Right upper arm, BP Patient Position: Sitting)   Pulse 88   Ht 5' 2\" (1.575 m)   Wt 194 lb (88 kg)   LMP 11/07/2010   SpO2 97%   BMI 35.48 kg/m²       Gen:  Well-developed, well-nourished, in no acute distress  HEENT:  Pink conjunctivae, PERRL, hearing intact to voice, moist mucous membranes  Neck:  Supple, without masses, thyroid non-tender  Resp:  No accessory muscle use, clear breath sounds without wheezes rales or rhonchi  Card:  No murmurs, normal S1, S2 without thrills, bruits or peripheral edema  Abd:  Soft, non-tender, non-distended, normoactive bowel sounds are present, no palpable organomegaly and no detectable hernias  Lymph:  No cervical or inguinal adenopathy  Musc:  No cyanosis or clubbing  Skin:  No rashes or ulcers, skin turgor is good  Neuro:  Cranial nerves are grossly intact, no focal motor weakness, follows commands appropriately  Psych:  Good insight, oriented to person, place and time, alert     Labs:     Lab Results   Component Value Date/Time    WBC 15.3 (H) 04/10/2021 09:41 PM    HGB 15.6 04/10/2021 09:41 PM    HCT 44.7 04/10/2021 09:41 PM    PLATELET 264 64/21/4577 09:41 PM    MCV 83.1 04/10/2021 09:41 PM     Lab Results   Component Value Date/Time    Hemoglobin A1c 5.5 03/24/2017 10:21 AM    Glucose 199 (H) 04/10/2021 09:41 PM    Glucose POC 95 04/14/2015 12:18 PM    LDL, calculated 125 (H) 09/24/2020 10:09 AM    Creatinine 1.06 (H) 04/10/2021 09:41 PM      Lab Results   Component Value Date/Time    Cholesterol, total 197 09/24/2020 10:09 AM    HDL Cholesterol 49 09/24/2020 10:09 AM    LDL, calculated 125 (H) 09/24/2020 10:09 AM    Triglyceride 115 09/24/2020 10:09 AM    CHOL/HDL Ratio 4.4 05/15/2020 08:10 AM     Lab Results   Component Value Date/Time    ALT (SGPT) 36 04/10/2021 09:41 PM    Alk.  phosphatase 73 04/10/2021 09:41 PM    Bilirubin, direct <0.1 10/08/2010 07:53 AM    Bilirubin, total 0.5 04/10/2021 09:41 PM    Albumin 3.9 04/10/2021 09:41 PM    Protein, total 7.0 04/10/2021 09:41 PM    PLATELET 502 15/37/8401 09:41 PM     No results found for: INR, INREXT, PTMR, PTP, PT1, PT2, INREXT   Lab Results   Component Value Date/Time    GFR est non-AA 53 (L) 04/10/2021 09:41 PM    GFR est AA >60 04/10/2021 09:41 PM    Creatinine 1.06 (H) 04/10/2021 09:41 PM    BUN 19 04/10/2021 09:41 PM    Sodium 138 04/10/2021 09:41 PM    Potassium 2.8 (L) 04/10/2021 09:41 PM    Chloride 106 04/10/2021 09:41 PM    CO2 20 (L) 04/10/2021 09:41 PM    Magnesium 2.1 05/03/2018 07:43 AM     No results found for: PSA, Corrinne Bloomer, [id number], [id number]  Lab Results   Component Value Date/Time    TSH 1.49 11/15/2019 12:29 PM    T4, Free 1.04 02/08/2016 04:59 PM      Lab Results   Component Value Date/Time    Glucose 199 (H) 04/10/2021 09:41 PM    Glucose POC 95 04/14/2015 12:18 PM      Lab Results   Component Value Date/Time    Troponin-I, Qt. <0.05 04/10/2021 09:41 PM      Lab Results   Component Value Date/Time    NT pro-BNP 14 03/26/2021 03:35 PM      Lab Results   Component Value Date/Time    Sodium 138 04/10/2021 09:41 PM    Potassium 2.8 (L) 04/10/2021 09:41 PM    Chloride 106 04/10/2021 09:41 PM    CO2 20 (L) 04/10/2021 09:41 PM    Anion gap 12 04/10/2021 09:41 PM    Glucose 199 (H) 04/10/2021 09:41 PM    BUN 19 04/10/2021 09:41 PM    Creatinine 1.06 (H) 04/10/2021 09:41 PM    BUN/Creatinine ratio 18 04/10/2021 09:41 PM    GFR est AA >60 04/10/2021 09:41 PM    GFR est non-AA 53 (L) 04/10/2021 09:41 PM    Calcium 9.0 04/10/2021 09:41 PM      Lab Results   Component Value Date/Time    Sodium 138 04/10/2021 09:41 PM    Potassium 2.8 (L) 04/10/2021 09:41 PM    Chloride 106 04/10/2021 09:41 PM    CO2 20 (L) 04/10/2021 09:41 PM    Anion gap 12 04/10/2021 09:41 PM    Glucose 199 (H) 04/10/2021 09:41 PM    BUN 19 04/10/2021 09:41 PM    Creatinine 1.06 (H) 04/10/2021 09:41 PM    BUN/Creatinine ratio 18 04/10/2021 09:41 PM    GFR est AA >60 04/10/2021 09:41 PM    GFR est non-AA 53 (L) 04/10/2021 09:41 PM    Calcium 9.0 04/10/2021 09:41 PM    Bilirubin, total 0.5 04/10/2021 09:41 PM    ALT (SGPT) 36 04/10/2021 09:41 PM    Alk. phosphatase 73 04/10/2021 09:41 PM    Protein, total 7.0 04/10/2021 09:41 PM    Albumin 3.9 04/10/2021 09:41 PM    Globulin 3.1 04/10/2021 09:41 PM    A-G Ratio 1.3 04/10/2021 09:41 PM      Lab Results   Component Value Date/Time    Hemoglobin A1c 5.5 03/24/2017 10:21 AM         No results for input(s): CPK, CKMB, TROIQ in the last 72 hours.     No lab exists for component: CKQMB, CPKMB        Problem List:     Problem List  Date Reviewed: 12/2/2020          Codes Class Noted    Dyslipidemia ICD-10-CM: E78.5  ICD-9-CM: 272.4  5/4/2018        Pseudophakia of both eyes ICD-10-CM: Z96.1  ICD-9-CM: V43.1  5/2/2018        Pseudophakia of right eye ICD-10-CM: Z96.1  ICD-9-CM: V43.1  8/14/2017        Nuclear cataract ICD-10-CM: VGC5027  ICD-9-CM: 366.16  4/25/2016        Tear film insufficiency ICD-10-CM: O85.439  ICD-9-CM: 375.15  4/25/2016        Insomnia ICD-10-CM: G47.00  ICD-9-CM: 780.52  Unknown    Overview Signed 2/8/2016  4:36 PM by Sheila Delarosa MD     severe, sleep study normal 2011             Essential hypertension (Chronic) ICD-10-CM: I10  ICD-9-CM: 401.9  2/8/2016        Rhinitis, allergic ICD-10-CM: J30.9  ICD-9-CM: 477.9  2/2/2015        Migraine (Chronic) ICD-10-CM: V66.828  ICD-9-CM: 346.90  2/24/2012        Dysfunctional uterine bleeding ICD-10-CM: N93.8  ICD-9-CM: 626.8  8/17/2011                Cristian Cabrera MD, [facility]

## 2021-05-03 ENCOUNTER — PATIENT OUTREACH (OUTPATIENT)
Dept: OTHER | Age: 61
End: 2021-05-03

## 2021-05-03 NOTE — PROGRESS NOTES
CCM Outreach     Call placed to pt, Verified  and address for HIPAA security. Goals      Attends follow-up appointments as directed. Future Appointments   Date Time Provider Dana Hilliard   3/29/2021 12:30 PM [facility]   3/30/2021 10:00 AM [facility]   3/30/2021 10:15 AM [facility]   2021 12:30 PM [facility]   2021  8:00 AM Allyson Cannon MD [facility]   2021 10:45 AM [facility]     3/29/21   PCP - has not scheduled an earlier appt, but will call today  Pulmonary/allergist - need to call and schedule F/U appt TBD  MRI head - completed today    21 Call placed to patient, no answer. Voicemail left to return call. Pt was [facility] 4/10/21    4/14/21  PCP - 21   Pulmonary - TBD - advised to schedule F/U appt ASAP  Future Appointments     Provider Dana Hilliard   2021 10:00 AM Ed Cleary MD [facility]   2021 10:45 AM (Arrive by 10:30 AM) [facility] [address] [address] at: First floor imagining  [address]   2021 8:00 AM Allyson Cannon MD [facility] AMB     5/3/21  PCP - attended appt 21 F/U 21  Cardio - attended appt 21 F/U 21  Neuro - 6/10/21  Pulmonary - attended appt last week F/U TBD  RTW - TBD       Care Coordination related to Memory Loss (pt-stated)      21 denies H/A's, but does report lightheadedness     5/3/21 remains out of work due to [place] issues. Scheduled for F/U evaluation by neuro 6/10/21        Knowledge and adherence of prescribed medication (ie. action, side effects, missed dose, etc.). Taking prescribed meds    21 taking prescribed meds, but does voice concerns that ongoing s/s that she has been experiencing maybe related to \"too many medications. \" Pt will discuss further with provider. Advised pt not to d/c meds until she has been advised by provider, agreed. 5/3/21 Reviewed meds with pt. Pt did NOT  Lexapro on 4/16/21 and resume after last visit with PCP stating that she didn't know she was suppose to take it, but is planning to pick it up tomorrow at [address]. Pt had stopped HCTZ accidentally and was advised to resume per cardio on 4/30/21 appt, but has NOT picked that script up either, but will tomorrow. Pt did confirm she is taking Buspar BID. PCP updated via staff message.  Supportive resources in place to maintain patient in the community (ie. Home Health, DME equipment, refer to, medication assistant plan, etc.)      [facility] - # [phone] 24/7  Nurse Access line 24/7  Be Well  [address] [facility] [address] [facility] 97 [facility] [phone]  HR Service Now - [facility] Workday  IT - [phone]  [facility] Help - [phone]- [phone]  Associate Services for advice and direction, by calling [phone] and pressing 1         Understands red flags post discharge. CP, SOB, cough, wheeze, temp >100    3/26/2021  6:08 PM - Huang, Lab In [facility]    Component Value Flag Ref Range Units Status   pH 7.48  High   7.35 - 7.45   Final   PCO2 32  Low   35 - 45 mmHg Final   PO2 77  Low   80 - 100 mmHg Final   O2 SAT 96   92 - 97 % Final   BICARBONATE 23   22 - 26 mmol/L Final   BASE EXCESS 0.6    mmol/L Final   O2 METHOD ROOM AIR      Final   Sample source ARTERIAL      Final   SITE RIGHT RADIAL      Final   NYDIA'S TEST YES      Final     3/29/21 reported that she continues to have SOB, minimal improvement. Pt also reported having nausea, which has been going on for weeks. Pt will address with PCP.     4/14/21 reported nausea is constant. Pt reported appetite is fair, only snacks during the day. Pt reported having 5 lose/soft stools a day, but voices that she \"prefers that instead of constipation. \" Denies taking laxative. Pt reported \"pressure\"in chest constant for a very long time. Denies temp. 5/3/21 denies cough, wheeze or SOB. Pt reported nausea is better.            CM will f/u in 3 weeks

## 2021-05-12 ENCOUNTER — ANCILLARY PROCEDURE (OUTPATIENT)
Dept: CARDIOLOGY CLINIC | Age: 61
End: 2021-05-12
Payer: COMMERCIAL

## 2021-05-12 VITALS
WEIGHT: 194 LBS | BODY MASS INDEX: 35.7 KG/M2 | DIASTOLIC BLOOD PRESSURE: 64 MMHG | HEIGHT: 62 IN | SYSTOLIC BLOOD PRESSURE: 116 MMHG

## 2021-05-12 DIAGNOSIS — R06.02 SOB (SHORTNESS OF BREATH): ICD-10-CM

## 2021-05-12 PROCEDURE — 93306 TTE W/DOPPLER COMPLETE: CPT | Performed by: INTERNAL MEDICINE

## 2021-05-13 LAB
ECHO AO ASC DIAM: 2.54 CM
ECHO AO ROOT DIAM: 2.86 CM
ECHO AV AREA PEAK VELOCITY: 2.43 CM2
ECHO AV AREA VTI: 2.22 CM2
ECHO AV AREA/BSA PEAK VELOCITY: 1.3 CM2/M2
ECHO AV AREA/BSA VTI: 1.2 CM2/M2
ECHO AV MEAN GRADIENT: 7.49 MMHG
ECHO AV PEAK GRADIENT: 13.72 MMHG
ECHO AV PEAK VELOCITY: 185.18 CM/S
ECHO AV VTI: 33.69 CM
ECHO LA AREA 4C: 19.03 CM2
ECHO LA MAJOR AXIS: 3.19 CM
ECHO LA MINOR AXIS: 1.69 CM
ECHO LA VOL 2C: 45.19 ML (ref 22–52)
ECHO LA VOL 4C: 53.26 ML (ref 22–52)
ECHO LA VOL BP: 52.55 ML (ref 22–52)
ECHO LA VOL/BSA BIPLANE: 27.85 ML/M2 (ref 16–28)
ECHO LA VOLUME INDEX A2C: 23.95 ML/M2 (ref 16–28)
ECHO LA VOLUME INDEX A4C: 28.23 ML/M2 (ref 16–28)
ECHO LV E' LATERAL VELOCITY: 11.27 CM/S
ECHO LV E' SEPTAL VELOCITY: 7.04 CM/S
ECHO LV INTERNAL DIMENSION DIASTOLIC: 4.85 CM (ref 3.9–5.3)
ECHO LV INTERNAL DIMENSION SYSTOLIC: 3.15 CM
ECHO LV IVSD: 0.83 CM (ref 0.6–0.9)
ECHO LV MASS 2D: 133.1 G (ref 67–162)
ECHO LV MASS INDEX 2D: 70.5 G/M2 (ref 43–95)
ECHO LV POSTERIOR WALL DIASTOLIC: 0.81 CM (ref 0.6–0.9)
ECHO LVOT DIAM: 1.98 CM
ECHO LVOT PEAK GRADIENT: 8.53 MMHG
ECHO LVOT PEAK VELOCITY: 146.07 CM/S
ECHO LVOT SV: 74.9 ML
ECHO LVOT VTI: 24.31 CM
ECHO MV A VELOCITY: 64.63 CM/S
ECHO MV E DECELERATION TIME (DT): 192.64 MS
ECHO MV E VELOCITY: 67.77 CM/S
ECHO MV E/A RATIO: 1.05
ECHO MV E/E' LATERAL: 6.01
ECHO MV E/E' RATIO (AVERAGED): 7.82
ECHO MV E/E' SEPTAL: 9.63
ECHO MV MAX VELOCITY: 77.7 CM/S
ECHO MV MEAN GRADIENT: 0.98 MMHG
ECHO MV PEAK GRADIENT: 2.42 MMHG
ECHO MV PRESSURE HALF TIME (PHT): 55.86 MS
ECHO MV VTI: 17.21 CM
ECHO RA AREA 4C: 11.01 CM2
ECHO RV INTERNAL DIMENSION: 3.76 CM
ECHO RV TAPSE: 2.02 CM (ref 1.5–2)

## 2021-05-14 ENCOUNTER — HOSPITAL ENCOUNTER (OUTPATIENT)
Dept: MAMMOGRAPHY | Age: 61
Discharge: HOME | End: 2021-05-14
Attending: INTERNAL MEDICINE
Payer: COMMERCIAL

## 2021-05-14 DIAGNOSIS — Z12.31 SCREENING MAMMOGRAM FOR HIGH-RISK PATIENT: ICD-10-CM

## 2021-05-14 PROCEDURE — 77063 BREAST TOMOSYNTHESIS BI: CPT

## 2021-05-18 ENCOUNTER — TRANSCRIBE ORDER (OUTPATIENT)
Dept: MAMMOGRAPHY | Age: 61
End: 2021-05-18

## 2021-05-18 DIAGNOSIS — R92.8 ABNORMAL MAMMOGRAM: Primary | ICD-10-CM

## 2021-05-20 ENCOUNTER — OFFICE VISIT (OUTPATIENT)
Dept: INTERNAL MEDICINE CLINIC | Age: 61
End: 2021-05-20
Payer: COMMERCIAL

## 2021-05-20 VITALS
BODY MASS INDEX: 35.48 KG/M2 | RESPIRATION RATE: 18 BRPM | DIASTOLIC BLOOD PRESSURE: 74 MMHG | OXYGEN SATURATION: 97 % | HEIGHT: 62 IN | SYSTOLIC BLOOD PRESSURE: 110 MMHG | TEMPERATURE: 97.8 F | HEART RATE: 78 BPM

## 2021-05-20 DIAGNOSIS — E78.2 MIXED HYPERLIPIDEMIA: ICD-10-CM

## 2021-05-20 DIAGNOSIS — F90.0 ATTENTION DEFICIT HYPERACTIVITY DISORDER (ADHD), PREDOMINANTLY INATTENTIVE TYPE: ICD-10-CM

## 2021-05-20 DIAGNOSIS — F98.8 ATTENTION DEFICIT DISORDER, UNSPECIFIED HYPERACTIVITY PRESENCE: ICD-10-CM

## 2021-05-20 DIAGNOSIS — F33.0 DEPRESSION, MAJOR, RECURRENT, MILD (HCC): ICD-10-CM

## 2021-05-20 DIAGNOSIS — G31.84 MCI (MILD COGNITIVE IMPAIRMENT): ICD-10-CM

## 2021-05-20 DIAGNOSIS — I10 ESSENTIAL HYPERTENSION: Primary | ICD-10-CM

## 2021-05-20 DIAGNOSIS — J45.20 MILD INTERMITTENT REACTIVE AIRWAY DISEASE WITHOUT COMPLICATION: ICD-10-CM

## 2021-05-20 PROCEDURE — 99214 OFFICE O/P EST MOD 30 MIN: CPT | Performed by: INTERNAL MEDICINE

## 2021-05-20 RX ORDER — DEXTROAMPHETAMINE SACCHARATE, AMPHETAMINE ASPARTATE, DEXTROAMPHETAMINE SULFATE AND AMPHETAMINE SULFATE 5; 5; 5; 5 MG/1; MG/1; MG/1; MG/1
20 TABLET ORAL DAILY
Qty: 30 TABLET | Refills: 0 | Status: SHIPPED | OUTPATIENT
Start: 2021-05-20 | End: 2021-07-12

## 2021-05-20 RX ORDER — ESCITALOPRAM OXALATE 10 MG/1
10 TABLET ORAL DAILY
COMMUNITY
End: 2021-09-29

## 2021-05-20 NOTE — PROGRESS NOTES
Nina Fernandes (: 1960) is a 61 y.o. female, established patient, here for evaluation of the following chief complaint(s):  Follow-up (htn)       ASSESSMENT/PLAN:  Below is the assessment and plan developed based on review of pertinent history, physical exam, labs, studies, and medications. 1. Essential hypertension- at goal cont losartan and hctz   2. Depression, major, recurrent, mild ([address]- she is taking the buspar and lexapro and is more herself and doing well   3. MCI (mild cognitive impairment)-she will cont with aricept and cont with adderall as well and needs to stay on top of it   4. Attention deficit disorder, unspecified hyperactivity presence- she has not been taking the adderall but it did help and needs to have a refill and will discuss with neurology - may need to be increased but she will discuss with them   5. Mixed hyperlipidemia-stable cont current dose of crestor and tolerating medicine   6. Mild intermittent reactive airway disease without complication-cont with singulair daily and taking advair as needed       No follow-ups on file. SUBJECTIVE/OBJECTIVE:  HPI  Subjective:   Nina Fernandes is a 61 y.o. female with hypertension. Hypertension ROS: taking medications as instructed, no medication side effects noted, no TIA's, no chest pain on exertion, no dyspnea on exertion, no swelling of ankles. New concerns: stable. MCI- she cont with aricept and is going to have memory testing again in 6 months    Anxiety and Depression- she is taking the lexapro and buspar- tolerating medicine ; buspar is helping a lot and noticed a decreased in anxiety ; and she has not been consistent with adderall     Brain Cyst- she had an appt with  and this does not require further intervention    Subjective:   Nina Fernandes is a 61 y.o. female with hyperlipidemia. Cardiovascular risk analysis - 61 y.o. female LDL goal is under 100.    ROS: taking medications as instructed, no medication side effects noted, no TIA's, no chest pain on exertion, no dyspnea on exertion, no swelling of ankles. Tolerating meds, no myalgias or other side effects noted  New concerns: stable on medicine . Review of Systems   All other systems reviewed and are negative. Physical Exam  Vitals and nursing note reviewed. Constitutional:       Appearance: She is well-developed. HENT:      Head: Normocephalic and atraumatic. Right Ear: External ear normal.      Left Ear: External ear normal.      Nose: Nose normal.   Neck:      Thyroid: No thyroid mass or thyromegaly. Vascular: No carotid bruit or JVD. Cardiovascular:      Rate and Rhythm: Normal rate and regular rhythm. Pulses: Normal pulses. Heart sounds: Normal heart sounds, S1 normal and S2 normal. No murmur heard. No friction rub. No gallop. Pulmonary:      Effort: Pulmonary effort is normal.      Breath sounds: Normal breath sounds. Abdominal:      General: Bowel sounds are normal.      Palpations: Abdomen is soft. Musculoskeletal:         General: Normal range of motion. Cervical back: Normal range of motion and neck supple. Skin:     General: Skin is warm and dry. Neurological:      Mental Status: She is alert and oriented to person, place, and time. Psychiatric:         Behavior: Behavior normal.         Thought Content: Thought content normal.         Judgment: Judgment normal.           On this date 05/20/2021 I have spent 30 minutes reviewing previous notes, test results and face to face with the patient discussing the diagnosis and importance of compliance with the treatment plan as well as documenting on the day of the visit. An electronic signature was used to authenticate this note.   -- Shannon French MD

## 2021-05-26 ENCOUNTER — PATIENT OUTREACH (OUTPATIENT)
Dept: OTHER | Age: 61
End: 2021-05-26

## 2021-05-26 NOTE — PROGRESS NOTES
CCM outreach    Goals       Attends follow-up appointments as directed. Future Appointments   Date Time Provider Dana Hilliard   3/29/2021 12:30 PM [facility]   [facility]   3/30/2021 10:15 [facility]   4/13/2021 12:30 PM [facility]   4/16/2021  8:00 AM Shah-Pandya, Claudene Quick, MD [facility]   5/14/2021 10:45 AM [facility] 1 [facility]     3/29/21   PCP - has not scheduled an earlier appt, but will call today  Pulmonary/allergist - need to call and schedule F/U appt TBD  MRI head - completed today    4/12/21 Call placed to patient, no answer. Voicemail left to return call. Pt was seen at [facility] ER 4/10/21    4/14/21  PCP - 4/16/21   Pulmonary - TBD - advised to schedule F/U appt ASAP  Future Appointments     Provider Dana Guajardoi   4/30/2021 10:00 AM Krzysztof Cleary MD [facility]   5/14/2021 10:45 AM (Arrive by 10:30 AM) [facility] MAM [address] at: First floor imagining  [address]   5/20/2021 8:00 AM Shah-Pandya, Claudene Quick, MD [facility] AMB     5/3/21  PCP - attended appt 4/16/21 F/U 5/20/21  Cardio - attended appt 4/30/21 F/U 5/12/21  Neuro - 6/10/21  Pulmonary - attended appt last week F/U TBD  RTW - TBD    5/26/21 Call placed to patient, no answer. Voicemail left to return call.   Care Coordination related to Memory Loss (pt-stated)       4/14/21 denies H/A's, but does report lightheadedness     5/3/21 remains out of work due to memory issues. Scheduled for F/U evaluation by neuro 6/10/21         Knowledge and adherence of prescribed medication (ie. action, side effects, missed dose, etc.). Taking prescribed meds    4/14/21 taking prescribed meds, but does voice concerns that ongoing s/s that she has been experiencing maybe related to \"too many medications. \" Pt will discuss further with provider. Advised pt not to d/c meds until she has been advised by provider, agreed. 5/3/21 Reviewed meds with pt. Pt did NOT  Lexapro on 4/16/21 and resume after last visit with PCP stating that she didn't know she was suppose to take it, but is planning to pick it up tomorrow at [address]. Pt had stopped HCTZ accidentally and was advised to resume per cardio on 4/30/21 appt, but has NOT picked that script up either, but will tomorrow. Pt did confirm she is taking Buspar BID. PCP updated via staff message.   Supportive resources in place to maintain patient in the community (ie. Home Health, DME equipment, refer to, medication assistant plan, etc.)       [facility] - # [phone] 24/7  Nurse Access line 24/7  Be Well  [address] [place] [address] Urgent [facility] [phone]  HR Service Now - [facility] Workday  IT - [phone]  [facility] Help - [phone]- [phone]  Associate Services for advice and direction, by calling [phone] and pressing 1          Understands red flags post discharge. CP, SOB, cough, wheeze, temp >100    3/26/2021  6:08 PM - Huang, Lab In [facility]    Component Value Flag Ref Range Units Status   pH 7.48  High   7.35 - 7.45   Final   PCO2 32  Low   35 - 45 mmHg Final   PO2 77  Low   80 - 100 mmHg Final   O2 SAT 96   92 - 97 % Final   BICARBONATE 23   22 - 26 mmol/L Final   BASE EXCESS 0.6    mmol/L Final   O2 METHOD ROOM AIR      Final   Sample source ARTERIAL      Final   SITE RIGHT RADIAL      Final   NYDIA'S TEST YES      Final     3/29/21 reported that she continues to have SOB, minimal improvement. Pt also reported having nausea, which has been going on for weeks. Pt will address with PCP.     4/14/21 reported nausea is constant. Pt reported appetite is fair, only snacks during the day.  Pt reported having 5 lose/soft stools a day, but voices that she \"prefers that instead of constipation. \" Denies taking laxative. Pt reported \"pressure\"in chest constant for a very long time. Denies temp. 5/3/21 denies cough, wheeze or SOB. Pt reported nausea is better.            CM will f/u in 3 weeks

## 2021-06-01 ENCOUNTER — HOSPITAL ENCOUNTER (OUTPATIENT)
Dept: MAMMOGRAPHY | Age: 61
Discharge: HOME | End: 2021-06-01
Payer: COMMERCIAL

## 2021-06-01 ENCOUNTER — PATIENT OUTREACH (OUTPATIENT)
Dept: OTHER | Age: 61
End: 2021-06-01

## 2021-06-01 DIAGNOSIS — R92.8 ABNORMAL MAMMOGRAM: ICD-10-CM

## 2021-06-01 PROCEDURE — 77065 DX MAMMO INCL CAD UNI: CPT

## 2021-06-01 NOTE — PROGRESS NOTES
CCM Outreach     Call placed o pt, Verified  and address for HIPAA security. Goals       Attends follow-up appointments as directed. Future Appointments   Date Time Provider Dana Hilliard   3/29/2021 12:30 PM [facility]   3/30/2021 10:00 AM [facility]PLER NEUROWTC  AMB   3/30/2021 10:15 AM EEG SCHEDULE [facility]UR[facility]   2021 12:30 PM Martin Nelson PsyD [facility]UR[facility]TC [facility]   2021  8:00 AM Sky Cannon Mc, MD [facility] BS AMB   2021 10:45 AM [facility] 1 [facility]     3/29/21   PCP - has not scheduled an earlier appt, but will call today  Pulmonary/allergist - need to call and schedule F/U appt TBD  MRI head - completed today    21 Call placed to patient, no answer. Voicemail left to return call. [facility] 4/10/21    4/14/21  PCP - 21   Pulmonary - TBD - advised to schedule F/U appt ASAP  Future Appointments     Provider Dana Hilliard   2021 10:00 AM Emily Cleary MD [facility] AMB   2021 10:45 AM (Arrive by 10:30 AM) [facility] YESY [address] at: First floor imagining  [address]   2021 8:00 AM Sky Cannon Mc, MD [facility] AMB     5/3/21  PCP - attended appt 21 F/U 21  Cardio - attended appt 21 F/U 21  Neuro - 6/10/21  Pulmonary - attended appt last week F/U TBD  RTW - TBD    21 Call placed to patient, no answer. Voicemail left to return call. 21  PCP - attended appt F/U 21  Cardio - attended appt 21 F/U 21  Neuro - 6/10/21  Pulmonary - attended appt last week F/U TBD  RTW - TBD        Care Coordination related to Memory Loss (pt-stated)       21 denies H/A's, but does report lightheadedness     5/3/21 remains out of work due to [employer] issues. Scheduled for F/U evaluation by neuro 6/10/21     6/1/21 noted improvement with med changes.  Scheduled for F/U with neuro to evaluate memory and the possibility of returning back to work.   Knowledge and adherence of prescribed medication (ie. action, side effects, missed dose, etc.). Taking prescribed meds    4/14/21 taking prescribed meds, but does voice concerns that ongoing s/s that she has been experiencing maybe related to \"too many medications. \" Pt will discuss further with provider. Advised pt not to d/c meds until she has been advised by provider, agreed. 5/3/21 Reviewed meds with pt. Pt did NOT  Lexapro on 4/16/21 and resume after last visit with PCP stating that she didn't know she was suppose to take it, but is planning to pick it up tomorrow at [address]. Pt had stopped HCTZ accidentally and was advised to resume per cardio on 4/30/21 appt, but has NOT picked that script up either, but will tomorrow. Pt did confirm she is taking Buspar BID. PCP updated via staff message. 6/1/21 taking prescribed meds. [address] resources in place to maintain patient in the community (ie. Home Health, DME equipment, refer to, medication assistant plan, etc.)       [facility] - # [phone] 24/7  Nurse Access line 24/7  Be Well  [address] [facility] 97 Urgent [facility] [phone]  HR Service Now - [facility] Workday  IT - [phone]  [facility] Help - [phone]- [phone]  Associate Services for advice and direction, by calling [phone] and pressing 1          Understands red flags post discharge.        CP, SOB, cough, wheeze, temp >100    3/26/2021  6:08 PM - Huang, Lab In [facility]    Component Value Flag Ref Range Units Status   pH 7.48  High   7.35 - 7.45   Final   PCO2 32  Low   35 - 45 mmHg Final   PO2 77  Low   80 - 100 mmHg Final   O2 SAT 96   92 - 97 % Final   BICARBONATE 23   22 - 26 mmol/L Final   BASE EXCESS 0.6    mmol/L Final   O2 METHOD ROOM AIR      Final   Sample source ARTERIAL      Final SITE RIGHT RADIAL      Final   NYDIA'S TEST YES      Final     3/29/21 reported that she continues to have SOB, minimal improvement. Pt also reported having nausea, which has been going on for weeks. Pt will address with PCP.     4/14/21 reported nausea is constant. Pt reported appetite is fair, only snacks during the day. Pt reported having 5 lose/soft stools a day, but voices that she \"prefers that instead of constipation. \" Denies taking laxative. Pt reported \"pressure\"in chest constant for a very long time. Denies temp. 5/3/21 denies cough, wheeze or SOB. Pt reported nausea is better. 6/1/21 denies red flags           CM will f/u as previously scheduled.

## 2021-06-10 ENCOUNTER — OFFICE VISIT (OUTPATIENT)
Dept: NEUROLOGY | Age: 61
End: 2021-06-10
Payer: COMMERCIAL

## 2021-06-10 DIAGNOSIS — F41.9 ANXIETY AND DEPRESSION: ICD-10-CM

## 2021-06-10 DIAGNOSIS — R41.3 SHORT-TERM MEMORY LOSS: ICD-10-CM

## 2021-06-10 DIAGNOSIS — G31.84 MILD COGNITIVE IMPAIRMENT: Primary | ICD-10-CM

## 2021-06-10 DIAGNOSIS — F32.A ANXIETY AND DEPRESSION: ICD-10-CM

## 2021-06-10 PROCEDURE — 96133 NRPSYC TST EVAL PHYS/QHP EA: CPT | Performed by: CLINICAL NEUROPSYCHOLOGIST

## 2021-06-10 PROCEDURE — 96138 PSYCL/NRPSYC TECH 1ST: CPT | Performed by: CLINICAL NEUROPSYCHOLOGIST

## 2021-06-10 PROCEDURE — 96137 PSYCL/NRPSYC TST PHY/QHP EA: CPT | Performed by: CLINICAL NEUROPSYCHOLOGIST

## 2021-06-10 PROCEDURE — 96132 NRPSYC TST EVAL PHYS/QHP 1ST: CPT | Performed by: CLINICAL NEUROPSYCHOLOGIST

## 2021-06-10 PROCEDURE — 96139 PSYCL/NRPSYC TST TECH EA: CPT | Performed by: CLINICAL NEUROPSYCHOLOGIST

## 2021-06-10 PROCEDURE — 96136 PSYCL/NRPSYC TST PHY/QHP 1ST: CPT | Performed by: CLINICAL NEUROPSYCHOLOGIST

## 2021-06-10 NOTE — LETTER
6/11/2021 Patient: Beto Degroot YOB: 1960 Date of Visit: 6/10/2021 Cristel Carter MD 
[address] [facility] [address] [place] [id number] [address] [employer] Dear Cristel Carter MD, Thank you for referring Ms. Tom Farrell to [facility] for evaluation. My notes for this consultation are attached. If you have questions, please do not hesitate to call me. I look forward to following your patient along with you. Sincerely, Raquel Cerda PsyD

## 2021-06-11 NOTE — PROGRESS NOTES
[address] [facility],[address]  [address]   [place] [address] [facility] Monroe[facility]airo wagner    [phone] Office   [phone] Fax      Neuropsychological Evaluation Report      Referral:  Angella , MD    Matteo Artis is a 61 y.o. right handed  female who was accompanied by her spouse to the initial clinical interview on 4/8/21. Please refer to her medical records for details pertaining to her history. At the start of the appointment, I reviewed the patient's [facility] Epic Chart (including Media scanned in from previous providers) for the active Problem List, all pertinent Past Medical Hx, medications, recent radiologic and laboratory findings. In addition, I reviewed pt's documented Immunization Record and Encounter History. When I saw her last in 2017:  Matteo Artis is a 64 y.o. right handed  and remarried  female who was unaccompanied to the initial clinical interview on 7/28/17. Please refer to her medical records for details pertaining to her history. Briefly, the patient reported that she completed college. She denied any history of previously diagnosed LD and/or receipt of special education services. 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 She is . She just had her right cataract out. No history of previously diagnosed stroke, TBI, meningitis/encephalitis, LIANA Fever, Lupus, Lyme, brain tumor, toxic chemical exposure, seizures. She has a history of chronic migraines since about age 13 and is on medication for same. She is on amitryptiline by  [facility], which makes her tired and sluggish. She has not slept well since she had her first child 35 years ago. She, her spouse, her boss have all noticed that she struggles with short term memory. This has been going on for a couple of years.   She had a couple of migraines so bad she thought she had a stroke, but recent MRI was normal.  She forgets the content of conversations. Misplaces things. Starts tasks and does not complete. Losing words. Loses train of thought in conversations. She has also noticed struggles with attention and focus. These were not issues a few years ago. She has to write everything down. Marcos Rodriges and spouse both told her she needs to see someone. Her father has dementia, but he had a stroke. Mother has dementia as well. Mother had remarried someone and he starved her and has neurocognitive residua from that, too (doesn't sound like AD). When mother got to the hospital and staff asked spouse how he had let her get like that and he had an aneurysm and , right there. No changes in sense of taste or smell. She has a history of depression in the past, when she went on antidepressant when her first spouse . She took that medication for a year. He  at age 25 from Hodgkin's and  right after their second child was born. Twin sister's spouse . Another death in family as well. Those were bad years 20-30 years ago. She feels quite anxious about her memory loss. She is naturally a very happy person but this is bringing her down. She does smile 99% of the time, and this issue is the only thing that is really bothering her right now. No changes in sense of taste or smell. Appetite is too good. Marriage is going well. No changes in sex drive but has vaginal dryness, and I noted she should talk to Dr. Alton Ortega about it. [employer] not helpful. She has pain in the neck and hurt her neck snow skiing, water skiing, whiplash injury. Always asks her spouse to pull on her neck. Having some sharp pain on the right side of her head, going on about six months, which happens once a week. Has seen a chiropractor in the past but doesn't like them adjusting their neck.       My diagnostic impression:     This patient generated a mixed normal/abnormal range Neuropsychological Evaluation with respect to neurocognitive functioning. In this regard, impairments are noted for auditory learning, auditory memory, visual organization, visual memory, bilateral fine motor dexterity, and she has a very mild problem with attention. Otherwise, her mental status, verbal fluency, confrontation naming, verbal comprehension, perceptual reasoning, working memory, processing speed, executive functioning, and bilateral motor speed remain from normal.  From an emotional standpoint, she reports mild depression and anxiety. She has dealt with quite a bit of loss and trauma in her life.                   There is a definite organic quality to this test performance, and the memory problems are not sufficiently explained by attention and/or mood related concerns. She is young for dementia but has a strong family history of same. I would call this Mild Cognitive Impairment with Memory Loss and suggest consideration for medication for memory as well as psychiatric treatment and counseling for mood related concerns. The memory loss issue is mild, but it is present and concerning. I would like to re-evaluate her in six months to do a test by test comparison and track for any progression. If it worsens over time, then this is a very early dementia type process. This would be AD, not vascular, in my opinion. The profile is not consistent with pseudodementia. I see that she put forth good effort on this tests. She is competent, but needs supervision for medications and finances. This memory issue will definitely impact her ability to safely and effectively work without extra time to complete tasks, repetition, repeated instructions, written reminders, and reduced distraction. Follow up in six months for diagnostic clarification. Baseline now established. I would refer her to Neurology as well.   Clinical correlation is strongly advised.                   I will discuss these findings with the patient when she follows up with me in the near future. A follow up Neuropsychological Evaluation is indicated on a prn basis, especially if there are any cognitive and/or emotional changes.       DIAGNOSES:             Mild Cognitive Impairment w/ Memory Loss - Mild                                      Depression - Mild                                      Anxiety - Mild      She has been through allergy testing. A cyst has been found on the brain. No blood clots in long. 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 Memory is worse. She is forgetting conversations. Misplaces things. Loses words. Loses train of thought. She has worsening memory. Hard to comprehend, learn, process new information. Also has trouble with multi-tasking, remembering certain questions that were asked of her. Just filed for short term disability. Her boss noticed that there were cognitive problems and they were the impetus for this evaluation. She has a list of things she normally does. When given a new list, she can't get it done. It's like she can't focus. Confidence is low right now. Asks the same questions over there. Saying the same things over and over. Anxiety and depression worse. Brain not working, and mind racing at night. No problems with driving, functional independence. Comparisons made with 3/29/2021     INDICATION: Previous brain lesion     TECHNIQUE: Pre and postcontrast MRI of the brain was performed after the  administration of 16 mL of Dotarem.     FINDINGS: High right frontal lobe parafalcine lesion is again noted which  demonstrates T2 and FLAIR hyperintensity with some mild increased T1 signal in  the inferior aspect of the lesion. High signal intensity within the lesion on  diffusion-weighted imaging is again noted although ADC value is relatively  normal. This measures 1.5 x 1.3 cm and is unchanged in size. No definite  enhancement is noted. No new lesions are noted.  Remainder the brain is  unremarkable.     IMPRESSION  Parafalcine high frontal lobe lesion is unchanged. Differential  remains the same. This may represent a dermoid/epidermoid cyst given mixed  signal intensity on T1-weighted imaging. Neuropsychological Mental Status Exam (NMSE):      Neuropsychological Mental Status Exam (NMSE):  Historian: Good  Praxis: No UE apraxia  R/L Orientation: Intact to self and to other  Dress: within normal limits   Weight: Overweight  Appearance/Hygiene: within normal limits   Gait: within normal limits   Assistive Devices: Glasses  Mood: within normal limits   Affect: within normal limits   Comprehension: within normal limits   Thought Process: within normal limits   Expressive Language: within normal limits   Receptive Language: within normal limits   Motor:  No cognitive or motor perseveration  ETOH: One or two drinks a month  Tobacco: Never   Illicit: Marijuana in high school  SI/HI: Denied  Psychosis: Denied/    Insight: Within normal limits  Judgment: Within normal limits  Other Psych:      Past Medical History:   Diagnosis Date    Anxiety     Desmoid fibromatosis     chest    Dysfunctional uterine bleeding 8/17/2011    GERD (gastroesophageal reflux disease)     Hyperlipemia     Hypertension     Insomnia     severe, sleep study normal 2011    Migraine 2/24/2012       Past Surgical History:   Procedure Laterality Date    COLONOSCOPY N/A 7/31/2020    [facility] ENDOSCOPY    ENDOSCOPY, COLON, DIAGNOSTIC  5/2010    HX BREAST BIOPSY Right     benign    HX GYN      d and c    HX OTHER SURGICAL      tumor removal in chest removed    DC CHEST SURGERY PROCEDURE UNLISTED      desmoid tumor       Allergies   Allergen Reactions    Erythromycin Nausea and Vomiting     Cannot keep erythromycin down.     Lisinopril Other (comments)     \"margoth achy\"    Other Plant, Animal, Environmental Other (comments)     hayfever       Family History   Problem Relation Age of Onset    Thyroid Disease Mother         goiter    Hypertension Father     Stroke Father     Thyroid Disease Sister         nodules    Hypertension Sister     Cancer Maternal Grandmother         polycythemia    Cancer Paternal Grandmother         breast    Breast Cancer Paternal Grandmother     Cancer Paternal Grandfather         lung cancer    Other Sister         lyme disease    No Known Problems Sister        Social History     Tobacco Use    Smoking status: Never Smoker    Smokeless tobacco: Never Used   Substance Use Topics    Alcohol use: Yes     Comment: rarely    Drug use: No       Current Outpatient Medications   Medication Sig Dispense Refill    escitalopram oxalate (Lexapro) 10 mg tablet Take 10 mg by mouth daily.  dextroamphetamine-amphetamine (ADDERALL) 20 mg tablet Take 1 Tablet by mouth daily. Max Daily Amount: 20 mg. 30 Tablet 0    busPIRone (BUSPAR) 10 mg tablet Take 1 Tab by mouth two (2) times a day. Take 1 tablet by mouth nightly. 60 Tab 5    montelukast (SINGULAIR) 10 mg tablet Take 1 Tab by mouth nightly. 30 Tab 3    losartan (COZAAR) 100 mg tablet Take 1 Tab by mouth daily. 90 Tab 1    guaifenesin (MUCINEX PO) Take  by mouth as needed.  clobetasoL (Temovate) 0.05 % ointment Apply  to affected area nightly. To arms and hands      mometasone (Elocon) 0.1 % ointment Apply  to affected area nightly.  ascorbic acid, vitamin C, (VITAMIN C) 500 mg tablet Take 500 mg by mouth daily.  vitamin W-B-C-lutein-minerals (OCUVITE) tablet Take 1 Tab by mouth nightly.  fluticasone propionate (Flonase Allergy Relief) 50 mcg/actuation nasal spray 2 Sprays by Both Nostrils route daily as needed for Allergies.  fluticasone propion-salmeteroL (Wixela Inhub) 250-50 mcg/dose diskus inhaler Take 1 Puff by inhalation every twelve (12) hours.  omeprazole (PRILOSEC) 20 mg capsule Take 20 mg by mouth nightly.       hydroCHLOROthiazide (HYDRODIURIL) 25 mg tablet Take 1 Tab by mouth daily. 90 Tab 1    albuterol (PROVENTIL HFA, VENTOLIN HFA, PROAIR HFA) 90 mcg/actuation inhaler Take 2 Puffs by inhalation every four (4) hours as needed for Shortness of Breath.  donepeziL (ARICEPT) 10 mg tablet TAKE 1 TABLET BY MOUTH EVERY DAY AT NIGHT 90 Tab 1    rosuvastatin (CRESTOR) 10 mg tablet Take 1 Tab by mouth nightly. 90 Tab 1         Plan:  Obtain authorization for testing from [facility]. Report to follow once testing, scoring, and interpretation completed. ? Organic based neurocognitive issues versus mood disorder or combination of same. ? Problems organic, functional, or both? This note will not be viewable in [address] Neuropsychological Evaluation  Patient Testing 7/28/19 Report Completed 7/29/19  A Psychometrist Assisted w/ portions of this evaluation while under my direct supervision    Please refer to the patient's initial interview progress note (copied above) and her medical records for details pertaining to her history. Today's neuropsychological test scores follow: The following assessment procedures were performed:      Neuropsychologist Performed, Interpreted, & Reported: Neuropsychological Mental Status Exam, Revised Memory & Behavior Checklist, Mini Mental State Exam, Clock Drawing Test, Test Of Premorbid Functioning, José Luis-Melzack Pain Questionnaire,  History Taking  & Clinical Interview With The Patient, Additional History Taking w/ The Patient's Spouse, BASIL, CPT-III, Review Of Available Records. Psychometrist Administered & Neuropsychologist Interpreted & Neuropsychologist Reported: Finger Tapping Test, Grooved Pegboard Test, Speech-Sounds Perception Test, [employer], Wechsler Adult Intelligence Scale - IV, Verbal Fluency Tests, Gómez & Gómez - Revised, Trailmaking Test Parts A & B, [place] Verbal Learning Test - 3, Jere Complex Figure Test, Beck Depression Inventory - II, Beck Anxiety Inventory,.       Test Findings:  Note: The patients raw data have been compared with currently available norms which include demographic corrections for age, gender, and/or education. Sometimes, the patients scores are compared to demographically similar individuals as close to the patients age, education level, etc., as possible. \"Average\" is viewed as being +/- 1 standard deviation (SD) from the stated mean for a particular test score. \"Low average\" is viewed as being between 1 and 2 SD below the mean, and above average is viewed as being 1 and 2 SD above the mean. Scores falling in the borderline range (between 1-1/2 and 2 SD below the mean) are viewed with particular attention as to whether they are normal or abnormal neurocognitive test scores. Other methods of inference in analyzing the test data are also utilized, including the pattern and range of scores in the profile, bilateral motor functions, and the presence, if any, of pathognomonic signs. Behaviorally, the patient was friendly and cooperative and appeared motivated to perform well during this examination. Within this context, the results of this evaluation are viewed as a valid reflection of the patients actual neurocognitive and emotional status. Her structured word list fluency, as assessed by the FAS Test, was within the average range with a T score of 45. Category fluency was within the average range with a T score of 50. Confrontation naming ability, as assessed by the [facility] - Revised, was within the average range at 56/60 correct (T = 51).    This pattern of performance is not indicative of a patient who is at increased risk for day-to-day problems with verbal fluency and confrontation naming ability was also normal. .  No change over time.                   The patient was administered the Sanjeev Continuous Performance Test - III, a computer-administered test of sustained attention, and review of the subscales within this instrument revealed very mild concern for inattentiveness without impulsivity. Verbal auditory attention and discrimination, as assessed by the Speech-Sounds Perception Test (T = 47) was within the average range. Nonverbal auditory attention and discrimination, as assessed by the [facility] Rhythm Test (T = 56) was above average. This pattern of performance is  indicative of a patient who is at increased risk for day-to-day problems with sustained visual attention. Mild decline over time. Auditory attention is normal (mild improvement).                The patient was administered the Wechsler Adult Intelligence Scale - IV. See Appendix I for full breakdown of IQ test scores (scanned into media section of this EMR). As can be seen, there was a clinically significant difference between her low average  range Working Memory Index score of 89 and her borderline range Processing Speed Index score of 79 (8th %ile). Her Verbal Comprehension Index score of 87 was low average. Her Perceptual Reasoning Index score of 82 was low average. IQ scores have dropped some from previous testing, and processing speed is now lower than what would be expected based on her performance on a task estimating premorbid functioning levels.                   The patient was administered the [facility] Test  - II and generated an impaired range and positive curve over five repeated auditory word list learning trials. An interference trial was within the borderline range. Free and cued, short and long delayed recall were all impaired, though she benefits some from cures. Recognition recall was normal (50th %ile). Forced choice recall was normal, suggesting that she put forth good effort on this test.  This pattern of performance is indicative of a patient who is at increased risk for day-to-day problems with auditory learning and memory.   No decline from previous testing.                   The patients performance on the copy portion of the Jere Complex Figure Test was impaired (<1st %ile). Recall for the complex design was within the impaired range after both short (<1st %ile) and long (<1st %ile) delays. Recognition recall was impaired (<1st %ile). This pattern of performance is indicative of a patient who is at increased risk for day-to-day problems with visual organization and visual delayed memory. No decline from previous testing.                   Simple timed visual motor sequencing (Trailmaking Test Part A) was within the above average range with a T score of 55. Her performance on a similar, but more complex task of timed visual motor sequencing (Trailmaking Test Part B) was within the below average range with a T score of 44. She made one sequencing error on this latter test.  Taken together, this pattern of performance is not indicative of a patient who is at increased risk for day-to-day problems with executive functioning. No changes from previous testing.                   Motor speed for finger tapping was within the normal range bilaterally. Fine motor dexterity, as assessed by the [facility], was within the mildly to moderately impaired range bilaterally. This is a mixed pattern of performance with respect to motor skills. No change over time.                   The patient rated her current level of pain as \"2/5 - Discomforting\" on the José Luis-Melzack Pain Questionnaire. She reported pain in her neck and right knee. She is reporting increased pain over time.                   . Her Rodriguez Depression Inventory -II score of 7 was within the minimally depressed range. Her Rodriguez Anxiety Inventory score of 8 reflected mild anxiety. No major changes in mood over time.                   The patient was also administered the Personality Assessment Inventory and generated a valid profile for interpretation. Within this context, there is support for mild depression and anxiety.  Self concept is harsh, fixed, and negative. She is inwardly much more troubled by self-doubt and misgivings about her adequacy than readily apparent to others.          Impressions & Recommendations: This patient has now undergone two evaluations of neurocognitive and psychologic   Functioning. There are some mild fluctuations in IQ scores over time, but, overall, there is no evidence of a decline in functioning over timer. She continues to show problems with auditory learning, auditory memory, visual organization, visual memory, bilateral fine motor dexterity, and she has a very mild problem with attention. Otherwise, her mental status, verbal fluency, confrontation naming, verbal comprehension, perceptual reasoning, working memory, processing speed, executive functioning, and bilateral motor speed remain from normal.  From an emotional standpoint, she reports mild depression and anxiety. There is loss and trauma in her life. Thankfully, there is no decline in memory from previous to current testing. This confirms a diagnosis of MCI with memory loss exacerbated by depression, anxiety, and trauma. I recommend continued medical and psychiatric/psychologic care. Stay active mentally, physically, and socially. Follow up in one year or prn. Not concerned about competency/capacity, day-to-day supervision, etc - but she does need to keep lists, use a calendar, etc). Clinical correlation is strongly advised.                   I will discuss these findings with the patient when she follows up with me in the near future.   A follow up Neuropsychological Evaluation is indicated on a prn basis, especially if there are any cognitive and/or emotional changes.       DIAGNOSES:             Mild Cognitive Impairment w/ Memory Loss - Mild, Stable                                      Depression - Mild- Stable                                      Anxiety - Mild, Stable            The above information is based upon information currently available to me. If there is any additional information of which I am currently unaware, I would be more than happy to review it upon having it made available to me. Thank you for the opportunity to see this interesting individual.     Sincerely,       Tan Porter. Marta Howard PsyD, EdS    CC: Martha Espana MD     Time Documentation:      11549 x1 &  08532 x 1 Neuropsych testing/data gathering by Neuropsychologist (60 minutes)     [phone] x 1  96139 x 7 Test Administration/Data Gathering By Technician: (4 hours). 75474 x 1 (first 30 minutes), 97376 x 7 (each additional 30 minutes)    96132 x 1  96133 x 1 Testing Evaluation Services by Neuropsychologist (1 hour, 50 minutes) 96132 x 1 (first hour), 96133 x 1 (50 minutes)    Definitions:      15257/81804:  Neurobehavioral Status Exam, Clinical interview. Clinical assessment of thinking, reasoning and judgment, by neuropsychologist, both face to face time with patient and time interpreting those test results and reporting, first and subsequent hours)    01950/94187: Neuropsychological Test Administration by Technician/Psychometrist, first 30 minutes and each additional 30 minutes. The above includes: Record review. Review of history provided by patient. Review of collaborative information. Testing by Clinician. Review of raw data. Scoring. Report writing of individual tests administered by Clinician. Integration of individual tests administered by psychometrist with NSE/testing by clinician, review of records/history/collaborative information, case Conceptualization, treatment planning, clinical decision making, report writing, coordination Of Care.  Psychometry test codes as time spent by psychometrist administering and scoring neurocognitive/psychological tests under supervision of neuropsychologist.  Integral services including scoring of raw data, data interpretation, case conceptualization, report writing etcetera were initiated after the patient finished testing/raw data collected and was completed on the date the report was signed.

## 2021-06-14 ENCOUNTER — TELEPHONE (OUTPATIENT)
Dept: INTERNAL MEDICINE CLINIC | Age: 61
End: 2021-06-14

## 2021-06-14 NOTE — TELEPHONE ENCOUNTER
Patient is calling to request a return to work note, her or her  will be by to pick it up as soon as its available - please advise: [phone]

## 2021-06-22 LAB
CHOLEST SERPL-MCNC: 188 MG/DL
GLUCOSE SERPL-MCNC: 87 MG/DL (ref 65–100)
HDLC SERPL-MCNC: 45 MG/DL
LDLC SERPL CALC-MCNC: 84.2 MG/DL (ref 0–100)
TRIGL SERPL-MCNC: 294 MG/DL (ref ?–150)

## 2021-07-02 DIAGNOSIS — E78.2 MIXED HYPERLIPIDEMIA: ICD-10-CM

## 2021-07-02 DIAGNOSIS — F90.0 ATTENTION DEFICIT HYPERACTIVITY DISORDER (ADHD), PREDOMINANTLY INATTENTIVE TYPE: ICD-10-CM

## 2021-07-02 RX ORDER — ROSUVASTATIN CALCIUM 10 MG/1
TABLET, COATED ORAL
Qty: 90 TABLET | Refills: 0 | Status: SHIPPED | OUTPATIENT
Start: 2021-07-02 | End: 2021-09-29

## 2021-07-12 ENCOUNTER — TELEPHONE (OUTPATIENT)
Dept: NEUROLOGY | Age: 61
End: 2021-07-12

## 2021-07-12 DIAGNOSIS — F90.0 ATTENTION DEFICIT HYPERACTIVITY DISORDER (ADHD), PREDOMINANTLY INATTENTIVE TYPE: ICD-10-CM

## 2021-07-12 RX ORDER — DEXTROAMPHETAMINE SACCHARATE, AMPHETAMINE ASPARTATE, DEXTROAMPHETAMINE SULFATE AND AMPHETAMINE SULFATE 5; 5; 5; 5 MG/1; MG/1; MG/1; MG/1
TABLET ORAL
Qty: 90 TABLET | Refills: 0 | Status: SHIPPED | OUTPATIENT
Start: 2021-07-12

## 2021-07-12 RX ORDER — DEXTROAMPHETAMINE SACCHARATE, AMPHETAMINE ASPARTATE, DEXTROAMPHETAMINE SULFATE AND AMPHETAMINE SULFATE 5; 5; 5; 5 MG/1; MG/1; MG/1; MG/1
TABLET ORAL
Qty: 30 TABLET | Refills: 0 | Status: SHIPPED | OUTPATIENT
Start: 2021-07-12 | End: 2021-07-12 | Stop reason: SDUPTHER

## 2021-07-12 NOTE — TELEPHONE ENCOUNTER
----- Message from Gabby Manzo sent at 7/12/2021  1:41 PM EDT -----  Regarding: REGINA Mclaughlin/Telephone    Caller's first and last name: [facility])  Reason for call: Update   Best contact number(s): [phone]  Details to clarify the request: Lawrence Petersen stated that she received a prescription for \" Adderall \"  today 07/12/2021 electronically . Caller stated that they are having technical difficulties and its not recognizing prescription stated invalid in the system. Caller stated that she needs a new prescription Faxed to [phone].

## 2021-07-15 ENCOUNTER — PATIENT OUTREACH (OUTPATIENT)
Dept: OTHER | Age: 61
End: 2021-07-15

## 2021-07-15 NOTE — PROGRESS NOTES
CCM Outreach    Call placed to pt, no home per . Reported pt is now back to work. Will attempt to reach pt at a later time. Goals       Attends follow-up appointments as directed. Future Appointments   Date Time Provider Dana Hilliard   3/29/2021 12:30 PM [facility]   3/30/2021 10:00 AM [facility] BS AMB   3/30/2021 10:15 AM EEG SCHEDULE NEUROWTC BS AMB   4/13/2021 12:30 PM Martin Nelson PsyD NEUROWTC BS AMB   4/16/2021  8:00 AM Tyesha Cannon MD [facility] BS AMB   5/14/2021 10:45 AM [facility] 1 [facility]     3/29/21   PCP - has not scheduled an earlier appt, but will call today  Pulmonary/allergist - need to call and schedule F/U appt TBD  MRI head - completed today    4/12/21 Call placed to patient, no answer. Voicemail left to return call. [facility] 4/10/21    4/14/21  PCP - 4/16/21   Pulmonary - TBD - advised to schedule F/U appt ASAP  Future Appointments     Provider Dana Hilliard   4/30/2021 10:00 AM Pravin Cleary [facility] BS AMB   5/14/2021 10:45 AM (Arrive by 10:30 AM) [facility] YESY [address] at: First floor imagining  [address]   5/20/2021 8:00 AM Tyesha Cannon MD [facility] BS AMB     5/3/21  PCP - attended appt 4/16/21 F/U 5/20/21  Cardio - attended appt 4/30/21 F/U 5/12/21  Neuro - 6/10/21  Pulmonary - attended appt last week F/U TBD  RTW - TBD    5/26/21 Call placed to patient, no answer. Voicemail left to return call. 6/1/21  PCP - attended appt F/U 5/20/21  Cardio - attended appt 4/30/21 F/U 5/12/21  Neuro - 6/10/21  Pulmonary - attended appt last week F/U TBD  RTW - TBD    7/15/21 pt not home, spoke with . RTW - back to work       [employer] related to Memory Loss (pt-stated)       4/14/21 denies H/A's, but does report lightheadedness     5/3/21 remains out of work due to [employer] issues. Scheduled for F/U evaluation by neuro 6/10/21     6/1/21 noted improvement with med changes. Scheduled for F/U with neuro to evaluate memory and the possibility of returning back to work.   Knowledge and adherence of prescribed medication (ie. action, side effects, missed dose, etc.). Taking prescribed meds    4/14/21 taking prescribed meds, but does voice concerns that ongoing s/s that she has been experiencing maybe related to \"too many medications. \" Pt will discuss further with provider. Advised pt not to d/c meds until she has been advised by provider, agreed. 5/3/21 Reviewed meds with pt. Pt did NOT  Lexapro on 4/16/21 and resume after last visit with PCP stating that she didn't know she was suppose to take it, but is planning to pick it up tomorrow at [address]. Pt had stopped HCTZ accidentally and was advised to resume per cardio on 4/30/21 appt, but has NOT picked that script up either, but will tomorrow. Pt did confirm she is taking Buspar BID. PCP updated via staff message. 6/1/21 taking prescribed meds. 31 Keke Vargas resources in place to maintain patient in the community (ie. Home Health, DME equipment, refer to, medication assistant plan, etc.)       [facility] - # [phone] 24/7  Nurse Access line 24/7  Be Well  [address] [place] [address] Urgent [facility] [phone]  HR Service Now - [facility] Workday  IT - [phone]  [facility] Help - [phone]  Associate Services for advice and direction, by calling [phone] and pressing 1          Understands red flags post discharge.        CP, SOB, cough, wheeze, temp >100    3/26/2021  6:08 PM - Huang, Lab In [facility]    Component Value Flag Ref Range Units Status   pH 7.48  High   7.35 - 7.45   Final   PCO2 32  Low   35 - 45 mmHg Final   PO2 77  Low   80 - 100 mmHg Final   O2 SAT 96   92 - 97 % Final   BICARBONATE 23   22 - 26 mmol/L Final   BASE EXCESS 0.6    mmol/L Final   O2 METHOD ROOM AIR      Final   Sample source ARTERIAL      Final   SITE RIGHT RADIAL      Final   NYDIA'S TEST YES      Final     3/29/21 reported that she continues to have SOB, minimal improvement. Pt also reported having nausea, which has been going on for weeks. Pt will address with PCP.     4/14/21 reported nausea is constant. Pt reported appetite is fair, only snacks during the day. Pt reported having 5 lose/soft stools a day, but voices that she \"prefers that instead of constipation. \" Denies taking laxative. Pt reported \"pressure\"in chest constant for a very long time. Denies temp. 5/3/21 denies cough, wheeze or SOB. Pt reported nausea is better.      6/1/21 denies red flags           CM will f/u in 2 weeks

## 2021-08-06 ENCOUNTER — PATIENT OUTREACH (OUTPATIENT)
Dept: OTHER | Age: 61
End: 2021-08-06

## 2021-08-06 NOTE — PROGRESS NOTES
CCM Outreach    Goals       Attends follow-up appointments as directed. Future Appointments   Date Time Provider Dana Hilliard   3/29/2021 12:30 PM [facility]   3/30/2021 10:00 AM [facility]   3/30/2021 10:15 AM EEG SCHEDULE NEUR[facility]TC [facility]   4/13/2021 12:30 PM Martin Nelson PsyD [facility]UR[facility]   4/16/2021  8:00 AM Nina Cannon MD [facility]   5/14/2021 10:45 AM [facility] 1 [facility]     3/29/21   PCP - has not scheduled an earlier appt, but will call today  Pulmonary/allergist - need to call and schedule F/U appt TBD  MRI head - completed today    4/12/21 Call placed to patient, no answer. Voicemail left to return call. [facility] 4/10/21    4/14/21  PCP - 4/16/21   Pulmonary - TBD - advised to schedule F/U appt ASAP  Future Appointments     Provider Dana Guajardoi   4/30/2021 10:00 AM Jorge Cleary MD [facility]   5/14/2021 10:45 AM (Arrive by 10:30 AM) [facility] MAM [address] at: First floor imagining  [address]   5/20/2021 8:00 AM Nina Cannon MD [facility] AMB     5/3/21  PCP - attended appt 4/16/21 F/U 5/20/21  Cardio - attended appt 4/30/21 F/U 5/12/21  Neuro - 6/10/21  Pulmonary - attended appt last week F/U TBD  RTW - TBD    5/26/21 Call placed to patient, no answer. Voicemail left to return call. 6/1/21  PCP - attended appt F/U 5/20/21  Cardio - attended appt 4/30/21 F/U 5/12/21  Neuro - 6/10/21  Pulmonary - attended appt last week F/U TBD  RTW - TBD    7/15/21 pt not home, spoke with . RTW - back to work     8/6/21 Call placed to patient, no answer. Voicemail left to return call.   [employer] related to Memory Loss (pt-stated)       4/14/21 denies H/A's, but does report lightheadedness     5/3/21 remains out of work due to memory issues.  Scheduled for F/U evaluation by neuro 6/10/21 6/1/21 noted improvement with med changes. Scheduled for F/U with neuro to evaluate memory and the possibility of returning back to work.   Knowledge and adherence of prescribed medication (ie. action, side effects, missed dose, etc.). Taking prescribed meds    4/14/21 taking prescribed meds, but does voice concerns that ongoing s/s that she has been experiencing maybe related to \"too many medications. \" Pt will discuss further with provider. Advised pt not to d/c meds until she has been advised by provider, agreed. 5/3/21 Reviewed meds with pt. Pt did NOT  Lexapro on 4/16/21 and resume after last visit with PCP stating that she didn't know she was suppose to take it, but is planning to pick it up tomorrow at [address]. Pt had stopped HCTZ accidentally and was advised to resume per cardio on 4/30/21 appt, but has NOT picked that script up either, but will tomorrow. Pt did confirm she is taking Buspar BID. PCP updated via staff message. 6/1/21 taking prescribed meds. 31 [address] Varg[address] resources in place to maintain patient in the community (ie. Home Health, DME equipment, refer to, medication assistant plan, etc.)       [facility] - # [phone] 24/7  Nurse Access line 24/7  Be Well  [address] [facility] [address] Urgent [facility] [phone]  HR Service Now - [facility]  BS Workday  IT - [phone]  [facility] Help - - [phone]  Associate Services for advice and direction, by calling [phone] and pressing 1          Understands red flags post discharge.        CP, SOB, cough, wheeze, temp >100    3/26/2021  6:08 PM - Huang, Lab In [facility]    Component Value Flag Ref Range Units Status   pH 7.48  High   7.35 - 7.45   Final   PCO2 32  Low   35 - 45 mmHg Final   PO2 77  Low   80 - 100 mmHg Final   O2 SAT 96   92 - 97 % Final   BICARBONATE 23   22 - 26 mmol/L Final   BASE EXCESS 0.6    mmol/L Final   O2 METHOD ROOM AIR      Final   Sample source ARTERIAL      Final   SITE RIGHT RADIAL      Final   NYDIA'S TEST YES      Final     3/29/21 reported that she continues to have SOB, minimal improvement. Pt also reported having nausea, which has been going on for weeks. Pt will address with PCP.     4/14/21 reported nausea is constant. Pt reported appetite is fair, only snacks during the day. Pt reported having 5 lose/soft stools a day, but voices that she \"prefers that instead of constipation. \" Denies taking laxative. Pt reported \"pressure\"in chest constant for a very long time. Denies temp. 5/3/21 denies cough, wheeze or SOB. Pt reported nausea is better.      6/1/21 denies red flags           CM will f/u in 3 weeks

## 2021-08-27 RX ORDER — DONEPEZIL HYDROCHLORIDE 10 MG/1
10 TABLET, FILM COATED ORAL
Qty: 90 TABLET | Refills: 1 | Status: SHIPPED | OUTPATIENT
Start: 2021-08-27

## 2021-09-21 ENCOUNTER — PATIENT OUTREACH (OUTPATIENT)
Dept: OTHER | Age: 61
End: 2021-09-21

## 2021-09-29 DIAGNOSIS — J30.1 SEASONAL ALLERGIC RHINITIS DUE TO POLLEN: ICD-10-CM

## 2021-09-29 DIAGNOSIS — F33.0 DEPRESSION, MAJOR, RECURRENT, MILD (HCC): ICD-10-CM

## 2021-09-29 DIAGNOSIS — E78.2 MIXED HYPERLIPIDEMIA: ICD-10-CM

## 2021-09-29 RX ORDER — ESCITALOPRAM OXALATE 10 MG/1
TABLET ORAL
Qty: 30 TABLET | Refills: 2 | Status: SHIPPED | OUTPATIENT
Start: 2021-09-29

## 2021-09-29 RX ORDER — ROSUVASTATIN CALCIUM 10 MG/1
TABLET, COATED ORAL
Qty: 90 TABLET | Refills: 0 | Status: SHIPPED | OUTPATIENT
Start: 2021-09-29

## 2021-09-29 RX ORDER — MONTELUKAST SODIUM 10 MG/1
TABLET ORAL
Qty: 30 TABLET | Refills: 2 | Status: SHIPPED | OUTPATIENT
Start: 2021-09-29

## 2021-09-29 RX ORDER — BUSPIRONE HYDROCHLORIDE 10 MG/1
TABLET ORAL
Qty: 60 TABLET | Refills: 4 | Status: SHIPPED | OUTPATIENT
Start: 2021-09-29

## 2022-03-19 PROBLEM — Z96.1 PSEUDOPHAKIA OF BOTH EYES: Status: ACTIVE | Noted: 2018-05-02

## 2022-03-19 PROBLEM — Z96.1 PSEUDOPHAKIA OF RIGHT EYE: Status: ACTIVE | Noted: 2017-08-14

## 2022-03-20 PROBLEM — E78.5 DYSLIPIDEMIA: Status: ACTIVE | Noted: 2018-05-04

## 2022-05-14 ENCOUNTER — HOSPITAL ENCOUNTER (EMERGENCY)
Age: 62
Discharge: HOME HEALTH | End: 2022-05-14
Attending: EMERGENCY MEDICINE

## 2022-05-14 VITALS
SYSTOLIC BLOOD PRESSURE: 137 MMHG | HEART RATE: 100 BPM | OXYGEN SATURATION: 92 % | BODY MASS INDEX: 36.44 KG/M2 | RESPIRATION RATE: 18 BRPM | HEIGHT: 62 IN | WEIGHT: 198 LBS | DIASTOLIC BLOOD PRESSURE: 77 MMHG | TEMPERATURE: 97.5 F

## 2022-05-14 DIAGNOSIS — R51.9 ACUTE NONINTRACTABLE HEADACHE, UNSPECIFIED HEADACHE TYPE: Primary | ICD-10-CM

## 2022-05-14 LAB
ALBUMIN SERPL-MCNC: 3.9 G/DL (ref 3.5–5)
ALBUMIN/GLOB SERPL: 1 {RATIO} (ref 1.1–2.2)
ALP SERPL-CCNC: 88 U/L (ref 45–117)
ALT SERPL-CCNC: 42 U/L (ref 12–78)
ANION GAP SERPL CALC-SCNC: 8 MMOL/L (ref 5–15)
AST SERPL-CCNC: 20 U/L (ref 15–37)
BASOPHILS # BLD: 0.1 K/UL (ref 0–0.1)
BASOPHILS NFR BLD: 1 % (ref 0–1)
BILIRUB SERPL-MCNC: 0.9 MG/DL (ref 0.2–1)
BUN SERPL-MCNC: 14 MG/DL (ref 6–20)
BUN/CREAT SERPL: 14 (ref 12–20)
CALCIUM SERPL-MCNC: 9.3 MG/DL (ref 8.5–10.1)
CHLORIDE SERPL-SCNC: 102 MMOL/L (ref 97–108)
CO2 SERPL-SCNC: 24 MMOL/L (ref 21–32)
COMMENT, HOLDF: NORMAL
CREAT SERPL-MCNC: 0.98 MG/DL (ref 0.55–1.02)
DIFFERENTIAL METHOD BLD: ABNORMAL
EOSINOPHIL # BLD: 0.1 K/UL (ref 0–0.4)
EOSINOPHIL NFR BLD: 1 % (ref 0–7)
ERYTHROCYTE [DISTWIDTH] IN BLOOD BY AUTOMATED COUNT: 12.8 % (ref 11.5–14.5)
GLOBULIN SER CALC-MCNC: 3.9 G/DL (ref 2–4)
GLUCOSE SERPL-MCNC: 158 MG/DL (ref 65–100)
HCT VFR BLD AUTO: 47.4 % (ref 35–47)
HGB BLD-MCNC: 16.6 G/DL (ref 11.5–16)
IMM GRANULOCYTES # BLD AUTO: 0.2 K/UL (ref 0–0.04)
IMM GRANULOCYTES NFR BLD AUTO: 2 % (ref 0–0.5)
LIPASE SERPL-CCNC: 180 U/L (ref 73–393)
LYMPHOCYTES # BLD: 1.9 K/UL (ref 0.8–3.5)
LYMPHOCYTES NFR BLD: 15 % (ref 12–49)
MCH RBC QN AUTO: 29.6 PG (ref 26–34)
MCHC RBC AUTO-ENTMCNC: 35 G/DL (ref 30–36.5)
MCV RBC AUTO: 84.5 FL (ref 80–99)
MONOCYTES # BLD: 1 K/UL (ref 0–1)
MONOCYTES NFR BLD: 8 % (ref 5–13)
NEUTS SEG # BLD: 9.8 K/UL (ref 1.8–8)
NEUTS SEG NFR BLD: 73 % (ref 32–75)
NRBC # BLD: 0 K/UL (ref 0–0.01)
NRBC BLD-RTO: 0 PER 100 WBC
PLATELET # BLD AUTO: 292 K/UL (ref 150–400)
PMV BLD AUTO: 10.1 FL (ref 8.9–12.9)
POTASSIUM SERPL-SCNC: 3.8 MMOL/L (ref 3.5–5.1)
PROT SERPL-MCNC: 7.8 G/DL (ref 6.4–8.2)
RBC # BLD AUTO: 5.61 M/UL (ref 3.8–5.2)
SAMPLES BEING HELD,HOLD: NORMAL
SODIUM SERPL-SCNC: 134 MMOL/L (ref 136–145)
WBC # BLD AUTO: 13.1 K/UL (ref 3.6–11)

## 2022-05-14 PROCEDURE — 83690 ASSAY OF LIPASE: CPT

## 2022-05-14 PROCEDURE — 80053 COMPREHEN METABOLIC PANEL: CPT

## 2022-05-14 PROCEDURE — 99284 EMERGENCY DEPT VISIT MOD MDM: CPT

## 2022-05-14 PROCEDURE — 74011250636 HC RX REV CODE- 250/636: Performed by: EMERGENCY MEDICINE

## 2022-05-14 PROCEDURE — 96374 THER/PROPH/DIAG INJ IV PUSH: CPT

## 2022-05-14 PROCEDURE — 85025 COMPLETE CBC W/AUTO DIFF WBC: CPT

## 2022-05-14 PROCEDURE — 36415 COLL VENOUS BLD VENIPUNCTURE: CPT

## 2022-05-14 PROCEDURE — 96375 TX/PRO/DX INJ NEW DRUG ADDON: CPT

## 2022-05-14 RX ORDER — METOCLOPRAMIDE HYDROCHLORIDE 5 MG/ML
10 INJECTION INTRAMUSCULAR; INTRAVENOUS
Status: COMPLETED | OUTPATIENT
Start: 2022-05-14 | End: 2022-05-14

## 2022-05-14 RX ORDER — ONDANSETRON 2 MG/ML
4 INJECTION INTRAMUSCULAR; INTRAVENOUS
Status: COMPLETED | OUTPATIENT
Start: 2022-05-14 | End: 2022-05-14

## 2022-05-14 RX ORDER — KETOROLAC TROMETHAMINE 30 MG/ML
15 INJECTION, SOLUTION INTRAMUSCULAR; INTRAVENOUS ONCE
Status: COMPLETED | OUTPATIENT
Start: 2022-05-14 | End: 2022-05-14

## 2022-05-14 RX ORDER — BUTALBITAL, ACETAMINOPHEN AND CAFFEINE 300; 40; 50 MG/1; MG/1; MG/1
1 CAPSULE ORAL
Qty: 11 CAPSULE | Refills: 0 | Status: SHIPPED | OUTPATIENT
Start: 2022-05-14 | End: 2022-05-14 | Stop reason: SDUPTHER

## 2022-05-14 RX ORDER — BUTALBITAL, ACETAMINOPHEN AND CAFFEINE 300; 40; 50 MG/1; MG/1; MG/1
1 CAPSULE ORAL
Qty: 11 CAPSULE | Refills: 0 | Status: SHIPPED | OUTPATIENT
Start: 2022-05-14

## 2022-05-14 RX ADMIN — ONDANSETRON 4 MG: 2 INJECTION INTRAMUSCULAR; INTRAVENOUS at 13:47

## 2022-05-14 RX ADMIN — SODIUM CHLORIDE 1000 ML: 9 INJECTION, SOLUTION INTRAVENOUS at 13:47

## 2022-05-14 RX ADMIN — METOCLOPRAMIDE 10 MG: 5 INJECTION, SOLUTION INTRAMUSCULAR; INTRAVENOUS at 13:52

## 2022-05-14 RX ADMIN — KETOROLAC TROMETHAMINE 15 MG: 30 INJECTION, SOLUTION INTRAMUSCULAR at 13:49

## 2022-05-14 NOTE — ED NOTES
Patient made aware of need for urine specimen. Denies urge to void at this time. Patient instructed to alert RN when ready and able to provide specimen.

## 2022-05-14 NOTE — DISCHARGE INSTRUCTIONS
Thank you for allowing us to provide you with medical care today. We realize that you have many choices for your emergency care needs. We thank you for choosing [facility]. Please choose us in the future for any continued health care needs. We hope we addressed all of your medical concerns. We strive to provide excellent quality care in the Emergency Department. Anything less than excellent does not meet our expectations. The exam and treatment you received in the Emergency Department were for an emergent problem and are not intended as complete care. It is important that you follow up with a doctor, nurse practitioner, or physician's assistant for ongoing care. If your symptoms worsen or you do not improve as expected and you are unable to reach your usual health care provider, you should return to the Emergency Department. We are available 24 hours a day. Take this sheet with you when you go to your follow-up visit. If you have any problem arranging the follow-up visit, contact the Emergency Department immediately. Make an appointment your family doctor for follow up of this visit. Return to the ER if you are unable to be seen in a timely manner.

## 2022-05-14 NOTE — ED PROVIDER NOTES
19-year-old female with history of anxiety, fibromatosis, GERD, dysfunctional uterine bleeding, hyperlipidemia, hypertension, migraines presents to the emergency department chief complaint of headache, neck pain, abdominal pain. She tells me her symptoms began last night. No emesis been sick. She has not been able to keep anything down. She relates the abdominal pain to nausea and vomiting. No fever. No injury. She has been in the emergency department previously for migraines but it has been many years. This feels similar to previous migraines although more severe. Headache is worse with bright lights and loud sounds. The history is provided by the patient, the spouse and medical records. Nausea   This is a new problem. The current episode started yesterday. The problem has not changed since onset. There has been no fever. Associated symptoms include abdominal pain, headaches and headaches. Pertinent negatives include no fever, no diarrhea, no arthralgias, no myalgias and no cough. Vomiting   Associated symptoms include abdominal pain, headaches and headaches. Pertinent negatives include no fever, no diarrhea, no arthralgias, no myalgias and no cough. Abdominal Pain   Associated symptoms include nausea, vomiting and headaches. Pertinent negatives include no fever, no diarrhea, no dysuria, no arthralgias, no myalgias and no chest pain.         Past Medical History:   Diagnosis Date    Anxiety     Desmoid fibromatosis     chest    Dysfunctional uterine bleeding 8/17/2011    GERD (gastroesophageal reflux disease)     Hyperlipemia     Hypertension     Insomnia     severe, sleep study normal 2011    Migraine 2/24/2012       Past Surgical History:   Procedure Laterality Date    COLONOSCOPY N/A 7/31/2020    COLONOSCOPY performed by Tristin Aparicio MD at [facility] ENDOSCOPY    ENDOSCOPY, COLON, DIAGNOSTIC  5/2010    HX BREAST BIOPSY Right     benign    HX GYN      d and c    HX OTHER SURGICAL      tumor removal in chest removed    DE CHEST SURGERY PROCEDURE UNLISTED      desmoid tumor         Family History:   Problem Relation Age of Onset    Thyroid Disease Mother         goiter    Hypertension Father     Stroke Father     Thyroid Disease Sister         nodules    Hypertension Sister     Cancer Maternal Grandmother         polycythemia    Cancer Paternal Grandmother         breast    Breast Cancer Paternal Grandmother     Cancer Paternal Grandfather         lung cancer    Other Sister         lyme disease    No Known Problems Sister        Social History     Socioeconomic History    Marital status:      Spouse name: Not on file    Number of children: Not on file    Years of education: Not on file    Highest education level: Not on file   Occupational History    Not on file   Tobacco Use    Smoking status: Never Smoker    Smokeless tobacco: Never Used   Substance and Sexual Activity    Alcohol use: Yes     Comment: rarely    Drug use: No    Sexual activity: Yes     Partners: Male   Other Topics Concern    Not on file   Social History Narrative    Not on file     Social Determinants of Health     Financial Resource Strain:     Difficulty of Paying Living Expenses: Not on file   Food Insecurity:     Worried About Running Out of Food in the Last Year: Not on file    Jazmín of Food in the Last Year: Not on file   Transportation Needs:     Lack of Transportation (Medical): Not on file    Lack of Transportation (Non-Medical):  Not on file   Physical Activity:     Days of Exercise per Week: Not on file    Minutes of Exercise per Session: Not on file   Stress:     Feeling of Stress : Not on file   Social Connections:     Frequency of Communication with Friends and Family: Not on file    Frequency of Social Gatherings with Friends and Family: Not on file    Attends Islam Services: Not on file    Active Member of Clubs or Organizations: Not on file    Attends Club or Organization Meetings: Not on file    Marital Status: Not on file   Intimate Partner Violence:     Fear of Current or Ex-Partner: Not on file    Emotionally Abused: Not on file    Physically Abused: Not on file    Sexually Abused: Not on file   Housing Stability:     Unable to Pay for Housing in the Last Year: Not on file    Number of Rebecca in the Last Year: Not on file    Unstable Housing in the Last Year: Not on file         ALLERGIES: Erythromycin; Lisinopril; and Other plant, animal, environmental    Review of Systems   Constitutional: Negative for fatigue and fever. HENT: Negative for sneezing and sore throat. Respiratory: Negative for cough and shortness of breath. Cardiovascular: Negative for chest pain and leg swelling. Gastrointestinal: Positive for abdominal pain, nausea and vomiting. Negative for diarrhea. Genitourinary: Negative for difficulty urinating and dysuria. Musculoskeletal: Negative for arthralgias and myalgias. Skin: Negative for color change and rash. Neurological: Positive for headaches. Negative for weakness. Psychiatric/Behavioral: Negative for agitation and behavioral problems. Vitals:    05/14/22 1254   BP: (!) 168/90   Pulse: 100   Resp: 20   Temp: 97.5 °F (36.4 °C)   SpO2: 98%   Weight: 89.8 kg (198 lb)   Height: 5' 2\" (1.575 m)            Physical Exam  Vitals and nursing note reviewed. Constitutional:       General: She is not in acute distress. Appearance: Normal appearance. She is well-developed. She is not ill-appearing, toxic-appearing or diaphoretic. HENT:      Head: Normocephalic and atraumatic. Nose: Nose normal.      Mouth/Throat:      Mouth: Mucous membranes are moist.      Pharynx: Oropharynx is clear. Eyes:      Extraocular Movements: Extraocular movements intact. Conjunctiva/sclera: Conjunctivae normal.      Pupils: Pupils are equal, round, and reactive to light.    Cardiovascular:      Rate and Rhythm: Normal rate and regular rhythm. Pulses: Normal pulses. Heart sounds: Normal heart sounds. Pulmonary:      Effort: Pulmonary effort is normal. No respiratory distress. Breath sounds: Normal breath sounds. No wheezing. Chest:      Chest wall: No tenderness. Abdominal:      General: Abdomen is flat. There is no distension. Palpations: Abdomen is soft. Tenderness: There is no abdominal tenderness. There is no guarding or rebound. Musculoskeletal:         General: No swelling, tenderness, deformity or signs of injury. Normal range of motion. Cervical back: Normal range of motion and neck supple. No rigidity. No muscular tenderness. Right lower leg: No edema. Left lower leg: No edema. Skin:     General: Skin is warm and dry. Capillary Refill: Capillary refill takes less than 2 seconds. Neurological:      General: No focal deficit present. Mental Status: She is alert and oriented to person, place, and time. Psychiatric:         Mood and Affect: Mood normal.         Behavior: Behavior normal.          MDM  Number of Diagnoses or Management Options  Diagnosis management comments: 40-year-old female presents as above with headache typical migraine with reassuring exam.  Symptoms significantly improved while in the emergency department treatment. Labs are reassuring with leukocytosis which is likely reactive to her nausea and vomiting. Plan to discharge with Fioricet, follow-up with primary care, return if needed.        Amount and/or Complexity of Data Reviewed  Clinical lab tests: reviewed           Procedures

## 2022-05-14 NOTE — ED TRIAGE NOTES
Pt arrives co nv and abd pain since 2100 last night  Pt arrives co neck and back pain   Pt has hx of migraines

## 2023-04-29 RX ORDER — FLUTICASONE PROPIONATE AND SALMETEROL 250; 50 UG/1; UG/1
1 POWDER RESPIRATORY (INHALATION) EVERY 12 HOURS
COMMUNITY

## 2023-04-29 RX ORDER — MOMETASONE FUROATE 1 MG/G
OINTMENT TOPICAL
COMMUNITY

## 2023-04-29 RX ORDER — ESCITALOPRAM OXALATE 10 MG/1
TABLET ORAL
COMMUNITY
Start: 2021-09-29

## 2023-04-29 RX ORDER — MONTELUKAST SODIUM 10 MG/1
TABLET ORAL
COMMUNITY
Start: 2021-09-29

## 2023-04-29 RX ORDER — LOSARTAN POTASSIUM 100 MG/1
TABLET ORAL DAILY
COMMUNITY
Start: 2021-04-12

## 2023-04-29 RX ORDER — HYDROCHLOROTHIAZIDE 25 MG/1
TABLET ORAL DAILY
COMMUNITY
Start: 2021-03-22

## 2023-04-29 RX ORDER — ROSUVASTATIN CALCIUM 10 MG/1
TABLET, COATED ORAL
COMMUNITY
Start: 2021-09-29

## 2023-04-29 RX ORDER — CLOBETASOL PROPIONATE 0.5 MG/G
OINTMENT TOPICAL
COMMUNITY

## 2023-04-29 RX ORDER — ALBUTEROL SULFATE 90 UG/1
2 AEROSOL, METERED RESPIRATORY (INHALATION) EVERY 4 HOURS PRN
COMMUNITY
Start: 2021-03-19

## 2023-04-29 RX ORDER — BUTALBITAL, ACETAMINOPHEN AND CAFFEINE 300; 40; 50 MG/1; MG/1; MG/1
1 CAPSULE ORAL EVERY 4 HOURS PRN
COMMUNITY
Start: 2022-05-14

## 2023-04-29 RX ORDER — FLUTICASONE PROPIONATE 50 MCG
2 SPRAY, SUSPENSION (ML) NASAL DAILY PRN
COMMUNITY

## 2023-04-29 RX ORDER — OMEPRAZOLE 20 MG/1
CAPSULE, DELAYED RELEASE ORAL
COMMUNITY

## 2023-04-29 RX ORDER — DEXTROAMPHETAMINE SACCHARATE, AMPHETAMINE ASPARTATE, DEXTROAMPHETAMINE SULFATE AND AMPHETAMINE SULFATE 5; 5; 5; 5 MG/1; MG/1; MG/1; MG/1
TABLET ORAL
COMMUNITY
Start: 2021-07-12

## 2023-04-29 RX ORDER — BUSPIRONE HYDROCHLORIDE 10 MG/1
TABLET ORAL
COMMUNITY
Start: 2021-09-29

## 2023-04-29 RX ORDER — DONEPEZIL HYDROCHLORIDE 10 MG/1
TABLET, FILM COATED ORAL
COMMUNITY
Start: 2021-08-27

## 2023-04-29 RX ORDER — ASCORBIC ACID 500 MG
TABLET ORAL DAILY
COMMUNITY

## 2024-09-02 NOTE — TELEPHONE ENCOUNTER
Problem: Adult Inpatient Plan of Care  Goal: Plan of Care Review  Outcome: Progressing  Flowsheets (Taken 9/1/2024 2009)  Plan of Care Reviewed With: patient  Goal: Patient-Specific Goal (Individualized)  Outcome: Progressing  Goal: Absence of Hospital-Acquired Illness or Injury  Outcome: Progressing  Intervention: Prevent Infection  Flowsheets (Taken 9/1/2024 2009)  Infection Prevention:   environmental surveillance performed   equipment surfaces disinfected   hand hygiene promoted   personal protective equipment utilized   rest/sleep promoted   single patient room provided  Goal: Optimal Comfort and Wellbeing  Outcome: Progressing  Intervention: Provide Person-Centered Care  Flowsheets (Taken 9/1/2024 2009)  Trust Relationship/Rapport:   care explained   choices provided   emotional support provided   empathic listening provided   questions answered   thoughts/feelings acknowledged   reassurance provided   questions encouraged  Goal: Readiness for Transition of Care  Outcome: Progressing  Intervention: Mutually Develop Transition Plan  Flowsheets (Taken 9/1/2024 2009)  Equipment Currently Used at Home: shower chair  Transportation Anticipated: family or friend will provide  Communicated MARCO A with patient/caregiver: Date not available/Unable to determine  Do you expect to return to your current living situation?: Yes  Do you have help at home or someone to help you manage your care at home?: Yes  Readmission within 30 days?: No  Do you currently have service(s) that help you manage your care at home?: Yes  Is the pt/caregiver preference to resume services with current agency: Yes      [facility] call in advising the script for rosuvastatin is no longer covered by her insurance and pharmacy and needs a prior auth or if  could call in a new medication.  She to refax the prior auth request.

## 2025-01-07 ENCOUNTER — HOSPITAL ENCOUNTER (EMERGENCY)
Facility: HOSPITAL | Age: 65
Discharge: HOME | End: 2025-01-08
Attending: EMERGENCY MEDICINE
Payer: COMMERCIAL

## 2025-01-07 ENCOUNTER — APPOINTMENT (OUTPATIENT)
Facility: HOSPITAL | Age: 65
End: 2025-01-07
Payer: COMMERCIAL

## 2025-01-07 DIAGNOSIS — R11.2 NAUSEA AND VOMITING, UNSPECIFIED VOMITING TYPE: Primary | ICD-10-CM

## 2025-01-07 LAB
ALBUMIN SERPL-MCNC: 4.2 G/DL (ref 3.5–5)
ALBUMIN/GLOB SERPL: 1.1 (ref 1.1–2.2)
ALP SERPL-CCNC: 78 U/L (ref 45–117)
ALT SERPL-CCNC: 47 U/L (ref 12–78)
ANION GAP SERPL CALC-SCNC: 7 MMOL/L (ref 2–12)
AST SERPL-CCNC: 20 U/L (ref 15–37)
BASOPHILS # BLD: 0.01 K/UL (ref 0–0.1)
BASOPHILS NFR BLD: 0.1 % (ref 0–1)
BILIRUB SERPL-MCNC: 0.8 MG/DL (ref 0.2–1)
BUN SERPL-MCNC: 13 MG/DL (ref 6–20)
BUN/CREAT SERPL: 13 (ref 12–20)
CALCIUM SERPL-MCNC: 10.3 MG/DL (ref 8.5–10.1)
CHLORIDE SERPL-SCNC: 108 MMOL/L (ref 97–108)
CO2 SERPL-SCNC: 22 MMOL/L (ref 21–32)
CREAT SERPL-MCNC: 1.01 MG/DL (ref 0.55–1.02)
CRP SERPL-MCNC: <0.29 MG/DL (ref 0–0.3)
DIFFERENTIAL METHOD BLD: ABNORMAL
EOSINOPHIL # BLD: 0 K/UL (ref 0–0.4)
EOSINOPHIL NFR BLD: 0 % (ref 0–7)
ERYTHROCYTE [DISTWIDTH] IN BLOOD BY AUTOMATED COUNT: 12.8 % (ref 11.5–14.5)
GLOBULIN SER CALC-MCNC: 3.8 G/DL (ref 2–4)
GLUCOSE SERPL-MCNC: 151 MG/DL (ref 65–100)
HCT VFR BLD AUTO: 47 % (ref 35–47)
HGB BLD-MCNC: 16.3 G/DL (ref 11.5–16)
IMM GRANULOCYTES # BLD AUTO: 0.06 K/UL (ref 0–0.04)
IMM GRANULOCYTES NFR BLD AUTO: 0.6 % (ref 0–0.5)
LIPASE SERPL-CCNC: 42 U/L (ref 13–75)
LYMPHOCYTES # BLD: 1 K/UL (ref 0.8–3.5)
LYMPHOCYTES NFR BLD: 9.2 % (ref 12–49)
MAGNESIUM SERPL-MCNC: 2 MG/DL (ref 1.6–2.4)
MCH RBC QN AUTO: 28.7 PG (ref 26–34)
MCHC RBC AUTO-ENTMCNC: 34.7 G/DL (ref 30–36.5)
MCV RBC AUTO: 82.7 FL (ref 80–99)
MONOCYTES # BLD: 0.62 K/UL (ref 0–1)
MONOCYTES NFR BLD: 5.7 % (ref 5–13)
NEUTS SEG # BLD: 9.18 K/UL (ref 1.8–8)
NEUTS SEG NFR BLD: 84.4 % (ref 32–75)
NRBC # BLD: 0 K/UL (ref 0–0.01)
NRBC BLD-RTO: 0 PER 100 WBC
PHOSPHATE SERPL-MCNC: 2.7 MG/DL (ref 2.6–4.7)
PLATELET # BLD AUTO: 308 K/UL (ref 150–400)
PMV BLD AUTO: 10.5 FL (ref 8.9–12.9)
POTASSIUM SERPL-SCNC: 3.5 MMOL/L (ref 3.5–5.1)
PROT SERPL-MCNC: 8 G/DL (ref 6.4–8.2)
RBC # BLD AUTO: 5.68 M/UL (ref 3.8–5.2)
SODIUM SERPL-SCNC: 137 MMOL/L (ref 136–145)
WBC # BLD AUTO: 10.9 K/UL (ref 3.6–11)

## 2025-01-07 PROCEDURE — 93005 ELECTROCARDIOGRAM TRACING: CPT | Performed by: EMERGENCY MEDICINE

## 2025-01-07 PROCEDURE — 86140 C-REACTIVE PROTEIN: CPT

## 2025-01-07 PROCEDURE — 96375 TX/PRO/DX INJ NEW DRUG ADDON: CPT

## 2025-01-07 PROCEDURE — 80053 COMPREHEN METABOLIC PANEL: CPT

## 2025-01-07 PROCEDURE — 96374 THER/PROPH/DIAG INJ IV PUSH: CPT

## 2025-01-07 PROCEDURE — 6360000004 HC RX CONTRAST MEDICATION: Performed by: RADIOLOGY

## 2025-01-07 PROCEDURE — 96361 HYDRATE IV INFUSION ADD-ON: CPT

## 2025-01-07 PROCEDURE — 36415 COLL VENOUS BLD VENIPUNCTURE: CPT

## 2025-01-07 PROCEDURE — 84100 ASSAY OF PHOSPHORUS: CPT

## 2025-01-07 PROCEDURE — 83690 ASSAY OF LIPASE: CPT

## 2025-01-07 PROCEDURE — 84145 PROCALCITONIN (PCT): CPT

## 2025-01-07 PROCEDURE — 83735 ASSAY OF MAGNESIUM: CPT

## 2025-01-07 PROCEDURE — 83605 ASSAY OF LACTIC ACID: CPT

## 2025-01-07 PROCEDURE — 2580000003 HC RX 258: Performed by: EMERGENCY MEDICINE

## 2025-01-07 PROCEDURE — 6360000002 HC RX W HCPCS: Performed by: EMERGENCY MEDICINE

## 2025-01-07 PROCEDURE — 85025 COMPLETE CBC W/AUTO DIFF WBC: CPT

## 2025-01-07 PROCEDURE — 82077 ASSAY SPEC XCP UR&BREATH IA: CPT

## 2025-01-07 PROCEDURE — 70450 CT HEAD/BRAIN W/O DYE: CPT

## 2025-01-07 PROCEDURE — 74177 CT ABD & PELVIS W/CONTRAST: CPT

## 2025-01-07 RX ORDER — PROCHLORPERAZINE EDISYLATE 5 MG/ML
10 INJECTION INTRAMUSCULAR; INTRAVENOUS ONCE
Status: COMPLETED | OUTPATIENT
Start: 2025-01-07 | End: 2025-01-07

## 2025-01-07 RX ORDER — KETOROLAC TROMETHAMINE 30 MG/ML
30 INJECTION, SOLUTION INTRAMUSCULAR; INTRAVENOUS
Status: COMPLETED | OUTPATIENT
Start: 2025-01-07 | End: 2025-01-07

## 2025-01-07 RX ORDER — 0.9 % SODIUM CHLORIDE 0.9 %
1000 INTRAVENOUS SOLUTION INTRAVENOUS ONCE
Status: COMPLETED | OUTPATIENT
Start: 2025-01-07 | End: 2025-01-08

## 2025-01-07 RX ORDER — ONDANSETRON 2 MG/ML
8 INJECTION INTRAMUSCULAR; INTRAVENOUS ONCE
Status: COMPLETED | OUTPATIENT
Start: 2025-01-07 | End: 2025-01-07

## 2025-01-07 RX ORDER — IOPAMIDOL 755 MG/ML
100 INJECTION, SOLUTION INTRAVASCULAR
Status: COMPLETED | OUTPATIENT
Start: 2025-01-07 | End: 2025-01-07

## 2025-01-07 RX ADMIN — KETOROLAC TROMETHAMINE 30 MG: 30 INJECTION, SOLUTION INTRAMUSCULAR at 23:16

## 2025-01-07 RX ADMIN — IOPAMIDOL 100 ML: 755 INJECTION, SOLUTION INTRAVENOUS at 23:47

## 2025-01-07 RX ADMIN — PROCHLORPERAZINE EDISYLATE 10 MG: 5 INJECTION INTRAMUSCULAR; INTRAVENOUS at 23:56

## 2025-01-07 RX ADMIN — SODIUM CHLORIDE 1000 ML: 9 INJECTION, SOLUTION INTRAVENOUS at 23:21

## 2025-01-07 RX ADMIN — ONDANSETRON 8 MG: 2 INJECTION INTRAMUSCULAR; INTRAVENOUS at 23:15

## 2025-01-07 ASSESSMENT — PAIN SCALES - GENERAL
PAINLEVEL_OUTOF10: 9
PAINLEVEL_OUTOF10: 6

## 2025-01-07 ASSESSMENT — PAIN DESCRIPTION - LOCATION
LOCATION: ABDOMEN
LOCATION: ABDOMEN

## 2025-01-07 ASSESSMENT — PAIN - FUNCTIONAL ASSESSMENT: PAIN_FUNCTIONAL_ASSESSMENT: 0-10

## 2025-01-07 ASSESSMENT — PAIN DESCRIPTION - DESCRIPTORS: DESCRIPTORS: DISCOMFORT;SHARP;SHOOTING

## 2025-01-07 ASSESSMENT — PAIN DESCRIPTION - ORIENTATION: ORIENTATION: MID

## 2025-01-08 ENCOUNTER — APPOINTMENT (OUTPATIENT)
Facility: HOSPITAL | Age: 65
End: 2025-01-08
Payer: COMMERCIAL

## 2025-01-08 VITALS
DIASTOLIC BLOOD PRESSURE: 79 MMHG | HEIGHT: 64 IN | HEART RATE: 90 BPM | OXYGEN SATURATION: 92 % | TEMPERATURE: 97.3 F | RESPIRATION RATE: 20 BRPM | WEIGHT: 200 LBS | BODY MASS INDEX: 34.15 KG/M2 | SYSTOLIC BLOOD PRESSURE: 149 MMHG

## 2025-01-08 LAB
AMPHET UR QL SCN: NEGATIVE
APPEARANCE UR: CLEAR
BACTERIA URNS QL MICRO: NEGATIVE /HPF
BARBITURATES UR QL SCN: NEGATIVE
BENZODIAZ UR QL: NEGATIVE
BILIRUB UR QL: NEGATIVE
CANNABINOIDS UR QL SCN: NEGATIVE
COCAINE UR QL SCN: NEGATIVE
COLOR UR: ABNORMAL
EKG ATRIAL RATE: 112 BPM
EKG DIAGNOSIS: NORMAL
EKG P-R INTERVAL: 140 MS
EKG Q-T INTERVAL: 368 MS
EKG QRS DURATION: 94 MS
EKG QTC CALCULATION (BAZETT): 502 MS
EKG R AXIS: 97 DEGREES
EKG T AXIS: 52 DEGREES
EKG VENTRICULAR RATE: 112 BPM
EPITH CASTS URNS QL MICRO: ABNORMAL /LPF
ETHANOL SERPL-MCNC: <10 MG/DL (ref 0–0.08)
GLUCOSE UR STRIP.AUTO-MCNC: NEGATIVE MG/DL
HGB UR QL STRIP: ABNORMAL
HYALINE CASTS URNS QL MICRO: ABNORMAL /LPF (ref 0–5)
KETONES UR QL STRIP.AUTO: ABNORMAL MG/DL
LEUKOCYTE ESTERASE UR QL STRIP.AUTO: NEGATIVE
Lab: NORMAL
METHADONE UR QL: NEGATIVE
NITRITE UR QL STRIP.AUTO: NEGATIVE
OPIATES UR QL: NEGATIVE
PCP UR QL: NEGATIVE
PH UR STRIP: 5.5 (ref 5–8)
PROCALCITONIN SERPL-MCNC: <0.05 NG/ML
PROT UR STRIP-MCNC: ABNORMAL MG/DL
RBC #/AREA URNS HPF: ABNORMAL /HPF (ref 0–5)
SP GR UR REFRACTOMETRY: 1.01 (ref 1–1.03)
UROBILINOGEN UR QL STRIP.AUTO: 0.2 EU/DL (ref 0.2–1)
WBC URNS QL MICRO: ABNORMAL /HPF (ref 0–4)

## 2025-01-08 PROCEDURE — 96375 TX/PRO/DX INJ NEW DRUG ADDON: CPT

## 2025-01-08 PROCEDURE — 6360000002 HC RX W HCPCS: Performed by: EMERGENCY MEDICINE

## 2025-01-08 PROCEDURE — 6360000004 HC RX CONTRAST MEDICATION: Performed by: RADIOLOGY

## 2025-01-08 PROCEDURE — 93010 ELECTROCARDIOGRAM REPORT: CPT | Performed by: SPECIALIST

## 2025-01-08 PROCEDURE — 99285 EMERGENCY DEPT VISIT HI MDM: CPT

## 2025-01-08 PROCEDURE — 81001 URINALYSIS AUTO W/SCOPE: CPT

## 2025-01-08 PROCEDURE — 80307 DRUG TEST PRSMV CHEM ANLYZR: CPT

## 2025-01-08 PROCEDURE — 70498 CT ANGIOGRAPHY NECK: CPT

## 2025-01-08 RX ORDER — ONDANSETRON 8 MG/1
8 TABLET, ORALLY DISINTEGRATING ORAL EVERY 8 HOURS PRN
Qty: 20 TABLET | Refills: 0 | Status: SHIPPED | OUTPATIENT
Start: 2025-01-08

## 2025-01-08 RX ORDER — IOPAMIDOL 755 MG/ML
100 INJECTION, SOLUTION INTRAVASCULAR
Status: COMPLETED | OUTPATIENT
Start: 2025-01-08 | End: 2025-01-08

## 2025-01-08 RX ORDER — DIPHENHYDRAMINE HYDROCHLORIDE 50 MG/ML
50 INJECTION INTRAMUSCULAR; INTRAVENOUS
Status: COMPLETED | OUTPATIENT
Start: 2025-01-08 | End: 2025-01-08

## 2025-01-08 RX ADMIN — IOPAMIDOL 100 ML: 755 INJECTION, SOLUTION INTRAVENOUS at 01:32

## 2025-01-08 RX ADMIN — DIPHENHYDRAMINE HYDROCHLORIDE 50 MG: 50 INJECTION INTRAMUSCULAR; INTRAVENOUS at 00:16

## 2025-01-08 NOTE — ED PROVIDER NOTES
0.6 (*)     Neutrophils Absolute 9.18 (*)     Immature Granulocytes Absolute 0.06 (*)     All other components within normal limits   COMPREHENSIVE METABOLIC PANEL - Abnormal; Notable for the following components:    Glucose 151 (*)     Calcium 10.3 (*)     All other components within normal limits   URINALYSIS WITH MICROSCOPIC - Abnormal; Notable for the following components:    Protein, UA TRACE (*)     Ketones, Urine TRACE (*)     Blood, Urine TRACE (*)     All other components within normal limits   LIPASE   MAGNESIUM   PHOSPHORUS   C-REACTIVE PROTEIN   PROCALCITONIN   URINE DRUG SCREEN   ETHANOL   LACTIC ACID   LACTIC ACID       All other labs were within normal range or not returned as of this dictation.    EMERGENCY DEPARTMENT COURSE and DIFFERENTIAL DIAGNOSIS/MDM:   Vitals:    Vitals:    01/07/25 2245 01/08/25 0000 01/08/25 0045   BP: (!) 169/109 (!) 149/79    Pulse: (!) 115 (!) 108 90   Resp: 18 23 20   Temp: 97.3 °F (36.3 °C)     TempSrc: Oral     SpO2: 96% 95% 92%   Weight: 90.7 kg (200 lb)     Height: 1.626 m (5' 4\")             Medical Decision Making  Assessment: 64-year-old female who presented to ER for evaluation for abdominal pain with nausea, vomiting and diaphoresis.  She has a fairly benign exam with stable vital signs.   who is also historian does state that the patient usually deferred to him lately for pain and discomfort and medical decision, history of the patient has been sick since Monday and has not been able to keep anything down.  She appears stable.  The patient also complained of a headache at that time appears aloof with transient episode of confusion disorientation that is easily redirectable.  She has no focal neurological exam.  NIH stroke scale at this time is 0.   said the patient appears to be at her baseline.  The patient will need evaluation for metabolic emergently electrolyte abnormality, colitis, pancreatitis, metabolic derangement including electrolyte

## 2025-01-08 NOTE — ED TRIAGE NOTES
Patient ambulatory to Triage with c/o nausea, vomiting and abdominal pain since Monday     Patient denies any fevers, chills    No hx of abdominal surgeries

## 2025-01-10 LAB — LACTATE BLD-SCNC: 1.63 MMOL/L (ref 0.4–2)

## (undated) DEVICE — BAG BELONG PT PERS CLEAR HANDL

## (undated) DEVICE — 1200 GUARD II KIT W/5MM TUBE W/O VAC TUBE: Brand: GUARDIAN

## (undated) DEVICE — CATH IV AUTOGRD BC PNK 20GA 25 -- INSYTE

## (undated) DEVICE — SET ADMIN 16ML TBNG L100IN 2 Y INJ SITE IV PIGGY BK DISP

## (undated) DEVICE — SNARE ENDOSCP M L240CM W27MM SHTH DIA2.4MM CHN 2.8MM OVL

## (undated) DEVICE — KIT COLON W/ 1.1OZ LUB AND 2 END

## (undated) DEVICE — SOLIDIFIER MEDC 1200ML -- CONVERT TO 356117

## (undated) DEVICE — ELECTRODE,RADIOTRANSLUCENT,FOAM,3PK: Brand: MEDLINE

## (undated) DEVICE — BAG SPEC BIOHZRD 10 X 10 IN --

## (undated) DEVICE — POLYP TRAP: Brand: TRAPEASE®

## (undated) DEVICE — CUFF BLD PRSS AD SZ 11 FOR 25-34CM 1 TB TRIPURPOSE CONN

## (undated) DEVICE — CONTAINER SPEC 20 ML LID NEUT BUFF FORMALIN 10 % POLYPR STS

## (undated) DEVICE — ADULT SPO2 SENSOR: Brand: NELLCOR

## (undated) DEVICE — 3M™ CUROS™ DISINFECTING CAP FOR NEEDLELESS CONNECTORS 270/CARTON 20 CARTONS/CASE CFF1-270: Brand: CUROS™

## (undated) DEVICE — Device

## (undated) DEVICE — SIMPLICITY FLUFF UNDERPAD 23X36, MODERATE: Brand: SIMPLICITY